# Patient Record
Sex: FEMALE | Race: WHITE | NOT HISPANIC OR LATINO | Employment: FULL TIME | URBAN - METROPOLITAN AREA
[De-identification: names, ages, dates, MRNs, and addresses within clinical notes are randomized per-mention and may not be internally consistent; named-entity substitution may affect disease eponyms.]

---

## 2017-08-03 VITALS
WEIGHT: 102 LBS | SYSTOLIC BLOOD PRESSURE: 110 MMHG | DIASTOLIC BLOOD PRESSURE: 64 MMHG | HEIGHT: 62 IN | BODY MASS INDEX: 18.77 KG/M2

## 2017-08-03 RX ORDER — TEMAZEPAM 15 MG/1
15 CAPSULE ORAL NIGHTLY PRN
COMMUNITY
End: 2019-10-01 | Stop reason: SDUPTHER

## 2017-08-03 RX ORDER — RALOXIFENE HYDROCHLORIDE 60 MG/1
60 TABLET, FILM COATED ORAL DAILY
COMMUNITY
End: 2017-08-18

## 2017-08-03 RX ORDER — ALPRAZOLAM 0.5 MG/1
0.5 TABLET ORAL 3 TIMES DAILY
COMMUNITY
End: 2017-11-06 | Stop reason: ALTCHOICE

## 2017-08-17 PROBLEM — C50.112 MALIGNANT NEOPLASM OF CENTRAL PORTION OF LEFT BREAST IN FEMALE, ESTROGEN RECEPTOR NEGATIVE: Status: ACTIVE | Noted: 2017-08-17

## 2017-08-17 PROBLEM — Z17.1 MALIGNANT NEOPLASM OF CENTRAL PORTION OF LEFT BREAST IN FEMALE, ESTROGEN RECEPTOR NEGATIVE: Status: ACTIVE | Noted: 2017-08-17

## 2017-08-23 ENCOUNTER — LAB VISIT (OUTPATIENT)
Dept: LAB | Facility: HOSPITAL | Age: 57
End: 2017-08-23
Attending: INTERNAL MEDICINE
Payer: COMMERCIAL

## 2017-08-23 DIAGNOSIS — Z17.1 MALIGNANT NEOPLASM OF CENTRAL PORTION OF LEFT BREAST IN FEMALE, ESTROGEN RECEPTOR NEGATIVE: ICD-10-CM

## 2017-08-23 DIAGNOSIS — C50.112 MALIGNANT NEOPLASM OF CENTRAL PORTION OF LEFT BREAST IN FEMALE, ESTROGEN RECEPTOR NEGATIVE: ICD-10-CM

## 2017-08-23 LAB
ALBUMIN SERPL BCP-MCNC: 4.6 G/DL
ALP SERPL-CCNC: 71 U/L
ALT SERPL W/O P-5'-P-CCNC: 55 U/L
ANION GAP SERPL CALC-SCNC: 9 MMOL/L
AST SERPL-CCNC: 36 U/L
BASOPHILS # BLD AUTO: 0.07 K/UL
BASOPHILS NFR BLD: 1.1 %
BILIRUB SERPL-MCNC: 0.6 MG/DL
BUN SERPL-MCNC: 15 MG/DL
CALCIUM SERPL-MCNC: 9.7 MG/DL
CHLORIDE SERPL-SCNC: 101 MMOL/L
CO2 SERPL-SCNC: 31 MMOL/L
CREAT SERPL-MCNC: 0.66 MG/DL
DIFFERENTIAL METHOD: ABNORMAL
EOSINOPHIL # BLD AUTO: 0.1 K/UL
EOSINOPHIL NFR BLD: 1.1 %
ERYTHROCYTE [DISTWIDTH] IN BLOOD BY AUTOMATED COUNT: 13.5 %
EST. GFR  (AFRICAN AMERICAN): >60 ML/MIN/1.73 M^2
EST. GFR  (NON AFRICAN AMERICAN): >60 ML/MIN/1.73 M^2
GLUCOSE SERPL-MCNC: 93 MG/DL
HCT VFR BLD AUTO: 40.4 %
HGB BLD-MCNC: 13.2 G/DL
LYMPHOCYTES # BLD AUTO: 1.7 K/UL
LYMPHOCYTES NFR BLD: 26.6 %
MAGNESIUM SERPL-MCNC: 2.3 MG/DL
MCH RBC QN AUTO: 30.6 PG
MCHC RBC AUTO-ENTMCNC: 32.7 G/DL
MCV RBC AUTO: 94 FL
MONOCYTES # BLD AUTO: 0.5 K/UL
MONOCYTES NFR BLD: 7.1 %
NEUTROPHILS # BLD AUTO: 4.1 K/UL
NEUTROPHILS NFR BLD: 64.1 %
NRBC BLD-RTO: 0 /100 WBC
PLATELET # BLD AUTO: 265 K/UL
PMV BLD AUTO: 9.1 FL
POTASSIUM SERPL-SCNC: 4 MMOL/L
PROT SERPL-MCNC: 7.9 G/DL
RBC # BLD AUTO: 4.32 M/UL
SODIUM SERPL-SCNC: 141 MMOL/L
WBC # BLD AUTO: 6.32 K/UL

## 2017-08-23 PROCEDURE — 80053 COMPREHEN METABOLIC PANEL: CPT | Mod: PN

## 2017-08-23 PROCEDURE — 80053 COMPREHEN METABOLIC PANEL: CPT

## 2017-08-23 PROCEDURE — 85025 COMPLETE CBC W/AUTO DIFF WBC: CPT

## 2017-08-23 PROCEDURE — 85025 COMPLETE CBC W/AUTO DIFF WBC: CPT | Mod: PN

## 2017-08-23 PROCEDURE — 83735 ASSAY OF MAGNESIUM: CPT

## 2017-08-23 PROCEDURE — 83735 ASSAY OF MAGNESIUM: CPT | Mod: PN

## 2017-08-23 PROCEDURE — 36415 COLL VENOUS BLD VENIPUNCTURE: CPT | Mod: PN

## 2017-08-24 ENCOUNTER — DOCUMENTATION ONLY (OUTPATIENT)
Dept: INFUSION THERAPY | Facility: HOSPITAL | Age: 57
End: 2017-08-24

## 2017-08-24 NOTE — PROGRESS NOTES
Nutrition: Late entry (8/23)/2017. Met with pt and pts family today for chemo new patient education. Pt is not currently a nutritional risk. Pt informed me she is eating well. Patient denies issues with bowels. Wt: 100# Nutrition recommendations: 1800kcals and 60 gram protein. Discussed the importance of weight maintenance and nutrition during treatment. Discussed the importance of consuming protein rich foods. Reviewed protein rich foods and discussed protein powders.  Discussed the importance of consuming adequate amounts of hydrating liquids. Discussed nausea MGT, bowel MGT and food safety. Encouraged to call with questions. Will follow up with patient once she begins treatment. Kita Jimenez, MS, RD, LDN

## 2017-08-30 ENCOUNTER — INFUSION (OUTPATIENT)
Dept: INFUSION THERAPY | Facility: HOSPITAL | Age: 57
End: 2017-08-30
Attending: INTERNAL MEDICINE
Payer: COMMERCIAL

## 2017-08-30 VITALS
TEMPERATURE: 98 F | HEART RATE: 76 BPM | SYSTOLIC BLOOD PRESSURE: 114 MMHG | RESPIRATION RATE: 16 BRPM | HEIGHT: 62 IN | DIASTOLIC BLOOD PRESSURE: 76 MMHG | BODY MASS INDEX: 18.71 KG/M2 | WEIGHT: 101.69 LBS

## 2017-08-30 DIAGNOSIS — Z17.1 MALIGNANT NEOPLASM OF CENTRAL PORTION OF LEFT BREAST IN FEMALE, ESTROGEN RECEPTOR NEGATIVE: Primary | ICD-10-CM

## 2017-08-30 DIAGNOSIS — C50.112 MALIGNANT NEOPLASM OF CENTRAL PORTION OF LEFT BREAST IN FEMALE, ESTROGEN RECEPTOR NEGATIVE: Primary | ICD-10-CM

## 2017-08-30 PROCEDURE — 96411 CHEMO IV PUSH ADDL DRUG: CPT | Mod: PN

## 2017-08-30 PROCEDURE — 96413 CHEMO IV INFUSION 1 HR: CPT | Mod: PN

## 2017-08-30 PROCEDURE — 25000003 PHARM REV CODE 250: Mod: PN | Performed by: INTERNAL MEDICINE

## 2017-08-30 PROCEDURE — 96375 TX/PRO/DX INJ NEW DRUG ADDON: CPT | Mod: PN

## 2017-08-30 PROCEDURE — 96367 TX/PROPH/DG ADDL SEQ IV INF: CPT | Mod: PN

## 2017-08-30 PROCEDURE — 63600175 PHARM REV CODE 636 W HCPCS: Mod: PN | Performed by: INTERNAL MEDICINE

## 2017-08-30 PROCEDURE — A4216 STERILE WATER/SALINE, 10 ML: HCPCS | Mod: PN | Performed by: INTERNAL MEDICINE

## 2017-08-30 RX ORDER — DOXORUBICIN HYDROCHLORIDE 2 MG/ML
60 INJECTION, SOLUTION INTRAVENOUS
Status: COMPLETED | OUTPATIENT
Start: 2017-08-30 | End: 2017-08-30

## 2017-08-30 RX ORDER — SODIUM CHLORIDE 0.9 % (FLUSH) 0.9 %
10 SYRINGE (ML) INJECTION
Status: DISCONTINUED | OUTPATIENT
Start: 2017-08-30 | End: 2017-08-30 | Stop reason: HOSPADM

## 2017-08-30 RX ORDER — HEPARIN 100 UNIT/ML
500 SYRINGE INTRAVENOUS
Status: DISCONTINUED | OUTPATIENT
Start: 2017-08-30 | End: 2017-08-30 | Stop reason: HOSPADM

## 2017-08-30 RX ORDER — LORAZEPAM 2 MG/ML
1 INJECTION INTRAMUSCULAR
Status: DISCONTINUED | OUTPATIENT
Start: 2017-08-30 | End: 2017-08-30 | Stop reason: HOSPADM

## 2017-08-30 RX ADMIN — CYCLOPHOSPHAMIDE 845 MG: 1 INJECTION, POWDER, FOR SOLUTION INTRAVENOUS; ORAL at 10:08

## 2017-08-30 RX ADMIN — SODIUM CHLORIDE, PRESERVATIVE FREE 10 ML: 5 INJECTION INTRAVENOUS at 09:08

## 2017-08-30 RX ADMIN — SODIUM CHLORIDE 150 MG: 9 INJECTION, SOLUTION INTRAVENOUS at 09:08

## 2017-08-30 RX ADMIN — HEPARIN 500 UNITS: 100 SYRINGE at 12:08

## 2017-08-30 RX ADMIN — PALONOSETRON HYDROCHLORIDE: 0.25 INJECTION INTRAVENOUS at 09:08

## 2017-08-30 RX ADMIN — LORAZEPAM 1 MG: 2 INJECTION INTRAMUSCULAR; INTRAVENOUS at 09:08

## 2017-08-30 RX ADMIN — DOXORUBICIN HYDROCHLORIDE 84 MG: 2 INJECTION, SOLUTION INTRAVENOUS at 10:08

## 2017-08-30 RX ADMIN — SODIUM CHLORIDE: 0.9 INJECTION, SOLUTION INTRAVENOUS at 09:08

## 2017-08-31 ENCOUNTER — INFUSION (OUTPATIENT)
Dept: INFUSION THERAPY | Facility: HOSPITAL | Age: 57
End: 2017-08-31
Attending: INTERNAL MEDICINE
Payer: COMMERCIAL

## 2017-08-31 VITALS
OXYGEN SATURATION: 99 % | RESPIRATION RATE: 16 BRPM | HEART RATE: 80 BPM | SYSTOLIC BLOOD PRESSURE: 104 MMHG | DIASTOLIC BLOOD PRESSURE: 64 MMHG | TEMPERATURE: 98 F

## 2017-08-31 DIAGNOSIS — Z17.1 MALIGNANT NEOPLASM OF CENTRAL PORTION OF LEFT BREAST IN FEMALE, ESTROGEN RECEPTOR NEGATIVE: Primary | ICD-10-CM

## 2017-08-31 DIAGNOSIS — C50.112 MALIGNANT NEOPLASM OF CENTRAL PORTION OF LEFT BREAST IN FEMALE, ESTROGEN RECEPTOR NEGATIVE: Primary | ICD-10-CM

## 2017-08-31 PROCEDURE — 96372 THER/PROPH/DIAG INJ SC/IM: CPT | Mod: PN

## 2017-08-31 PROCEDURE — 63600175 PHARM REV CODE 636 W HCPCS: Mod: PN | Performed by: INTERNAL MEDICINE

## 2017-08-31 RX ADMIN — PEGFILGRASTIM 6 MG: 6 INJECTION SUBCUTANEOUS at 03:08

## 2017-09-13 ENCOUNTER — DOCUMENTATION ONLY (OUTPATIENT)
Dept: INFUSION THERAPY | Facility: HOSPITAL | Age: 57
End: 2017-09-13

## 2017-09-13 ENCOUNTER — INFUSION (OUTPATIENT)
Dept: INFUSION THERAPY | Facility: HOSPITAL | Age: 57
End: 2017-09-13
Attending: INTERNAL MEDICINE
Payer: COMMERCIAL

## 2017-09-13 VITALS
RESPIRATION RATE: 18 BRPM | TEMPERATURE: 98 F | OXYGEN SATURATION: 100 % | WEIGHT: 100.69 LBS | BODY MASS INDEX: 18.53 KG/M2 | HEART RATE: 80 BPM | HEIGHT: 62 IN | SYSTOLIC BLOOD PRESSURE: 114 MMHG | DIASTOLIC BLOOD PRESSURE: 69 MMHG

## 2017-09-13 DIAGNOSIS — C50.112 MALIGNANT NEOPLASM OF CENTRAL PORTION OF LEFT BREAST IN FEMALE, ESTROGEN RECEPTOR NEGATIVE: Primary | ICD-10-CM

## 2017-09-13 DIAGNOSIS — Z17.1 MALIGNANT NEOPLASM OF CENTRAL PORTION OF LEFT BREAST IN FEMALE, ESTROGEN RECEPTOR NEGATIVE: Primary | ICD-10-CM

## 2017-09-13 PROCEDURE — 96367 TX/PROPH/DG ADDL SEQ IV INF: CPT | Mod: PN

## 2017-09-13 PROCEDURE — 96413 CHEMO IV INFUSION 1 HR: CPT | Mod: PN

## 2017-09-13 PROCEDURE — 63600175 PHARM REV CODE 636 W HCPCS: Mod: PN | Performed by: INTERNAL MEDICINE

## 2017-09-13 PROCEDURE — 96377 APPLICATON ON-BODY INJECTOR: CPT | Mod: PN

## 2017-09-13 PROCEDURE — 63600175 PHARM REV CODE 636 W HCPCS: Mod: PN | Performed by: PHYSICIAN ASSISTANT

## 2017-09-13 PROCEDURE — A4216 STERILE WATER/SALINE, 10 ML: HCPCS | Mod: PN | Performed by: PHYSICIAN ASSISTANT

## 2017-09-13 PROCEDURE — 96411 CHEMO IV PUSH ADDL DRUG: CPT | Mod: PN

## 2017-09-13 PROCEDURE — 25000003 PHARM REV CODE 250: Mod: PN | Performed by: PHYSICIAN ASSISTANT

## 2017-09-13 RX ORDER — SODIUM CHLORIDE 0.9 % (FLUSH) 0.9 %
10 SYRINGE (ML) INJECTION
Status: DISCONTINUED | OUTPATIENT
Start: 2017-09-13 | End: 2017-09-13 | Stop reason: HOSPADM

## 2017-09-13 RX ORDER — DOXORUBICIN HYDROCHLORIDE 2 MG/ML
60 INJECTION, SOLUTION INTRAVENOUS
Status: COMPLETED | OUTPATIENT
Start: 2017-09-13 | End: 2017-09-13

## 2017-09-13 RX ORDER — HEPARIN 100 UNIT/ML
500 SYRINGE INTRAVENOUS
Status: DISCONTINUED | OUTPATIENT
Start: 2017-09-13 | End: 2017-09-13 | Stop reason: HOSPADM

## 2017-09-13 RX ADMIN — SODIUM CHLORIDE 150 MG: 9 INJECTION, SOLUTION INTRAVENOUS at 09:09

## 2017-09-13 RX ADMIN — DOXORUBICIN HYDROCHLORIDE 84 MG: 2 INJECTION, SOLUTION INTRAVENOUS at 09:09

## 2017-09-13 RX ADMIN — SODIUM CHLORIDE: 0.9 INJECTION, SOLUTION INTRAVENOUS at 08:09

## 2017-09-13 RX ADMIN — SODIUM CHLORIDE, PRESERVATIVE FREE 10 ML: 5 INJECTION INTRAVENOUS at 11:09

## 2017-09-13 RX ADMIN — HEPARIN 500 UNITS: 100 SYRINGE at 11:09

## 2017-09-13 RX ADMIN — CYCLOPHOSPHAMIDE 845 MG: 1 INJECTION, POWDER, FOR SOLUTION INTRAVENOUS; ORAL at 10:09

## 2017-09-13 RX ADMIN — PEGFILGRASTIM 6 MG: KIT SUBCUTANEOUS at 11:09

## 2017-09-13 RX ADMIN — PALONOSETRON HYDROCHLORIDE: 0.25 INJECTION INTRAVENOUS at 09:09

## 2017-09-13 NOTE — PLAN OF CARE
Problem: Chemotherapy Effects (Adult)  Goal: Signs and Symptoms of Listed Potential Problems Will be Absent, Minimized or Managed (Chemotherapy Effects)  Signs and symptoms of listed potential problems will be absent, minimized or managed by discharge/transition of care (reference Chemotherapy Effects (Adult) CPG).   Outcome: Ongoing (interventions implemented as appropriate)  Pt receiving C2D1 Pt c/o diarrhea. Pts  be tx for c-diff pt on prophylactic flagyl tested negative for cdiff but pt was having diarrhea over the weekend. Symptoms resolved. Pt also presented to ED with fever over weekend. Dr Singh aware. Saw pt in chair. Pt on celexa for depression. Pt also c/o generalized fatigue. Will continue to monitor.

## 2017-09-13 NOTE — PROGRESS NOTES
:     LCSW met with pt in chair for social service aspect of chemo school. Provided [facility]     Distress Screening:    Patient did complete distress screening tool:  Distress Score    Distress Score: 10        Practical Problems Physical Problems   : No Appearance: Yes   Housing: No     Insurance / Financial: Yes      Transportation: No       Work / School: No      Treatment Decisions: No             Family Problems      Dealing with Children: No      Dealing with Partner: No      Ability to Have Children: No       Family Health Issues: Yes            Emotional Problems      Depression: Yes       Fears: No       Nervousness: No       Sadness: No      Worry: No      Lost of Interest in Usual Activities: No            Spiritual/Religions Concerns     Spritual / Holiness Concerns: No         Other Problems            LCSW provided support and acknowledged challenges. LCSW provided information on support groups.   Patient endorses financial concerns related to loss of work due to treatment. Pt has FMLA. Pt has short term disability policy through work and she will utilize this benefit during treatment. Concerned that income will be at 60% during time away on disability.     Pt's distress related to 's recent hospitalization and her concern for avoiding the transmission of his highly contagious virus. Pt endorses history of anxiety and depression related to the loss of her son 5 years ago. Pt is new grandmother to baby granddaughter. LCSW encouraged pt to utilize adaptive coping mechanisms, encouraged use of stress reduction leandro on phone, discussed peer support programs and made referrals.LCSW also encouraged patient to utilize on line and phone support services as well as offered to be available to patient for support. LCSW offered to make referral to behavioral health provider if pt wants to pursue counseling from a community provider.      Family/Social/Spiritual Support:  Spouse is supportive as is daughter and other family members.     Patient states she has many friends and family members who will be involved in support.    Patient sites deep luisa, strong support system, and positive attitude as aspects of his/her strength.         Plan:  1. Patient indicated interest in [employer] for financial support. This LCSW provided application and  will assist.  2. Patient also indicated interest in [employer] as well as [employer]. This LCSW will enroll pt.  3. Pt will purse funding through [employer] when application process opens on Oct.1. LCSW remind pt.  4. Pt interested in wig boutique. LCSW will assist pt when she is ready. LCSW did provide pt with 2 head coverings.  5. LCSW following. Will meet again with pt to assess distress level and assist with zi applications.    Ebonie Ramirez LCSW

## 2017-09-13 NOTE — PLAN OF CARE
Problem: Patient Care Overview  Goal: Plan of Care Review  Outcome: Ongoing (interventions implemented as appropriate)  Pt tolerated tx well. No signs of infusion reaction noted. AVS given to pt. Pt educated to call Dr with any issues. Pt verbalized understanding.

## 2017-09-27 ENCOUNTER — INFUSION (OUTPATIENT)
Dept: INFUSION THERAPY | Facility: HOSPITAL | Age: 57
End: 2017-09-27
Attending: INTERNAL MEDICINE
Payer: COMMERCIAL

## 2017-09-27 ENCOUNTER — DOCUMENTATION ONLY (OUTPATIENT)
Dept: INFUSION THERAPY | Facility: HOSPITAL | Age: 57
End: 2017-09-27

## 2017-09-27 VITALS
DIASTOLIC BLOOD PRESSURE: 76 MMHG | HEART RATE: 83 BPM | WEIGHT: 96.63 LBS | BODY MASS INDEX: 17.78 KG/M2 | SYSTOLIC BLOOD PRESSURE: 124 MMHG | TEMPERATURE: 98 F | RESPIRATION RATE: 20 BRPM | HEIGHT: 62 IN

## 2017-09-27 DIAGNOSIS — C50.112 MALIGNANT NEOPLASM OF CENTRAL PORTION OF LEFT BREAST IN FEMALE, ESTROGEN RECEPTOR NEGATIVE: Primary | ICD-10-CM

## 2017-09-27 DIAGNOSIS — Z17.1 MALIGNANT NEOPLASM OF CENTRAL PORTION OF LEFT BREAST IN FEMALE, ESTROGEN RECEPTOR NEGATIVE: Primary | ICD-10-CM

## 2017-09-27 PROCEDURE — 96367 TX/PROPH/DG ADDL SEQ IV INF: CPT | Mod: PN

## 2017-09-27 PROCEDURE — 96413 CHEMO IV INFUSION 1 HR: CPT | Mod: PN

## 2017-09-27 PROCEDURE — 96411 CHEMO IV PUSH ADDL DRUG: CPT | Mod: PN

## 2017-09-27 PROCEDURE — 63600175 PHARM REV CODE 636 W HCPCS: Mod: PN | Performed by: PHYSICIAN ASSISTANT

## 2017-09-27 PROCEDURE — 96377 APPLICATON ON-BODY INJECTOR: CPT | Mod: PN

## 2017-09-27 PROCEDURE — A4216 STERILE WATER/SALINE, 10 ML: HCPCS | Mod: PN | Performed by: PHYSICIAN ASSISTANT

## 2017-09-27 PROCEDURE — 25000003 PHARM REV CODE 250: Mod: PN | Performed by: PHYSICIAN ASSISTANT

## 2017-09-27 RX ORDER — DOXORUBICIN HYDROCHLORIDE 2 MG/ML
60 INJECTION, SOLUTION INTRAVENOUS
Status: COMPLETED | OUTPATIENT
Start: 2017-09-27 | End: 2017-09-27

## 2017-09-27 RX ORDER — SODIUM CHLORIDE 0.9 % (FLUSH) 0.9 %
10 SYRINGE (ML) INJECTION
Status: DISCONTINUED | OUTPATIENT
Start: 2017-09-27 | End: 2017-09-27 | Stop reason: HOSPADM

## 2017-09-27 RX ADMIN — CYCLOPHOSPHAMIDE 845 MG: 1 INJECTION, POWDER, FOR SOLUTION INTRAVENOUS; ORAL at 10:09

## 2017-09-27 RX ADMIN — SODIUM CHLORIDE, PRESERVATIVE FREE 10 ML: 5 INJECTION INTRAVENOUS at 08:09

## 2017-09-27 RX ADMIN — DOXORUBICIN HYDROCHLORIDE 84 MG: 2 INJECTION, SOLUTION INTRAVENOUS at 09:09

## 2017-09-27 RX ADMIN — PALONOSETRON HYDROCHLORIDE: 0.25 INJECTION INTRAVENOUS at 08:09

## 2017-09-27 RX ADMIN — PEGFILGRASTIM 6 MG: KIT SUBCUTANEOUS at 12:09

## 2017-09-27 RX ADMIN — SODIUM CHLORIDE: 0.9 INJECTION, SOLUTION INTRAVENOUS at 08:09

## 2017-09-27 RX ADMIN — SODIUM CHLORIDE 150 MG: 9 INJECTION, SOLUTION INTRAVENOUS at 09:09

## 2017-09-27 NOTE — NURSING
Pt left after tx today to go for MRI. Pt returned to infusion after MRI for OBI neulasta placement.

## 2017-10-11 ENCOUNTER — INFUSION (OUTPATIENT)
Dept: INFUSION THERAPY | Facility: HOSPITAL | Age: 57
End: 2017-10-11
Attending: INTERNAL MEDICINE
Payer: COMMERCIAL

## 2017-10-11 ENCOUNTER — LAB VISIT (OUTPATIENT)
Dept: LAB | Facility: HOSPITAL | Age: 57
End: 2017-10-11
Attending: INTERNAL MEDICINE
Payer: COMMERCIAL

## 2017-10-11 ENCOUNTER — DOCUMENTATION ONLY (OUTPATIENT)
Dept: INFUSION THERAPY | Facility: HOSPITAL | Age: 57
End: 2017-10-11

## 2017-10-11 VITALS
RESPIRATION RATE: 17 BRPM | SYSTOLIC BLOOD PRESSURE: 106 MMHG | WEIGHT: 99.5 LBS | BODY MASS INDEX: 18.31 KG/M2 | HEIGHT: 62 IN | TEMPERATURE: 98 F | DIASTOLIC BLOOD PRESSURE: 64 MMHG | HEART RATE: 74 BPM

## 2017-10-11 DIAGNOSIS — C50.112 MALIGNANT NEOPLASM OF CENTRAL PORTION OF LEFT BREAST IN FEMALE, ESTROGEN RECEPTOR NEGATIVE: Primary | ICD-10-CM

## 2017-10-11 DIAGNOSIS — C50.112 MALIGNANT NEOPLASM OF CENTRAL PORTION OF LEFT BREAST IN FEMALE, ESTROGEN RECEPTOR NEGATIVE: ICD-10-CM

## 2017-10-11 DIAGNOSIS — Z17.1 MALIGNANT NEOPLASM OF CENTRAL PORTION OF LEFT BREAST IN FEMALE, ESTROGEN RECEPTOR NEGATIVE: Primary | ICD-10-CM

## 2017-10-11 DIAGNOSIS — Z17.1 MALIGNANT NEOPLASM OF CENTRAL PORTION OF LEFT BREAST IN FEMALE, ESTROGEN RECEPTOR NEGATIVE: ICD-10-CM

## 2017-10-11 LAB
ALBUMIN SERPL BCP-MCNC: 4.1 G/DL
ALP SERPL-CCNC: 155 U/L
ALT SERPL W/O P-5'-P-CCNC: 90 U/L
ANION GAP SERPL CALC-SCNC: 9 MMOL/L
AST SERPL-CCNC: 61 U/L
BASOPHILS NFR BLD: 0 %
BILIRUB SERPL-MCNC: 0.3 MG/DL
BUN SERPL-MCNC: 13 MG/DL
CALCIUM SERPL-MCNC: 9.6 MG/DL
CHLORIDE SERPL-SCNC: 102 MMOL/L
CO2 SERPL-SCNC: 28 MMOL/L
CREAT SERPL-MCNC: 0.55 MG/DL
DIFFERENTIAL METHOD: ABNORMAL
EOSINOPHIL NFR BLD: 0 %
ERYTHROCYTE [DISTWIDTH] IN BLOOD BY AUTOMATED COUNT: 14.2 %
EST. GFR  (AFRICAN AMERICAN): >60 ML/MIN/1.73 M^2
EST. GFR  (NON AFRICAN AMERICAN): >60 ML/MIN/1.73 M^2
GLUCOSE SERPL-MCNC: 89 MG/DL
HCT VFR BLD AUTO: 31.4 %
HGB BLD-MCNC: 10.6 G/DL
LYMPHOCYTES NFR BLD: 3 %
MAGNESIUM SERPL-MCNC: 2.2 MG/DL
MCH RBC QN AUTO: 30 PG
MCHC RBC AUTO-ENTMCNC: 33.8 G/DL
MCV RBC AUTO: 89 FL
METAMYELOCYTES NFR BLD MANUAL: 1 %
MONOCYTES NFR BLD: 10 %
MYELOCYTES NFR BLD MANUAL: 2 %
NEUTROPHILS # BLD AUTO: 19.4 K/UL
NEUTROPHILS NFR BLD: 79 %
NEUTS BAND NFR BLD MANUAL: 5 %
NRBC BLD-RTO: 0 /100 WBC
PLATELET # BLD AUTO: 193 K/UL
PMV BLD AUTO: 9.5 FL
POLYCHROMASIA BLD QL SMEAR: ABNORMAL
POTASSIUM SERPL-SCNC: 4.1 MMOL/L
PROT SERPL-MCNC: 7.2 G/DL
RBC # BLD AUTO: 3.53 M/UL
SODIUM SERPL-SCNC: 139 MMOL/L
TOXIC GRANULES BLD QL SMEAR: PRESENT
WBC # BLD AUTO: 23.1 K/UL

## 2017-10-11 PROCEDURE — 85007 BL SMEAR W/DIFF WBC COUNT: CPT | Mod: PN

## 2017-10-11 PROCEDURE — 83735 ASSAY OF MAGNESIUM: CPT | Mod: PN

## 2017-10-11 PROCEDURE — 85027 COMPLETE CBC AUTOMATED: CPT

## 2017-10-11 PROCEDURE — 80053 COMPREHEN METABOLIC PANEL: CPT | Mod: PN

## 2017-10-11 PROCEDURE — 85007 BL SMEAR W/DIFF WBC COUNT: CPT

## 2017-10-11 PROCEDURE — 96413 CHEMO IV INFUSION 1 HR: CPT | Mod: PN

## 2017-10-11 PROCEDURE — A4216 STERILE WATER/SALINE, 10 ML: HCPCS | Mod: PN | Performed by: PHYSICIAN ASSISTANT

## 2017-10-11 PROCEDURE — 63600175 PHARM REV CODE 636 W HCPCS: Mod: PN | Performed by: PHYSICIAN ASSISTANT

## 2017-10-11 PROCEDURE — 80053 COMPREHEN METABOLIC PANEL: CPT

## 2017-10-11 PROCEDURE — 96367 TX/PROPH/DG ADDL SEQ IV INF: CPT | Mod: PN

## 2017-10-11 PROCEDURE — 83735 ASSAY OF MAGNESIUM: CPT

## 2017-10-11 PROCEDURE — 36415 COLL VENOUS BLD VENIPUNCTURE: CPT | Mod: PN

## 2017-10-11 PROCEDURE — 96377 APPLICATON ON-BODY INJECTOR: CPT | Mod: PN

## 2017-10-11 PROCEDURE — 85027 COMPLETE CBC AUTOMATED: CPT | Mod: PN

## 2017-10-11 PROCEDURE — 25000003 PHARM REV CODE 250: Mod: PN | Performed by: PHYSICIAN ASSISTANT

## 2017-10-11 PROCEDURE — 96411 CHEMO IV PUSH ADDL DRUG: CPT | Mod: PN

## 2017-10-11 RX ORDER — SODIUM CHLORIDE 0.9 % (FLUSH) 0.9 %
10 SYRINGE (ML) INJECTION
Status: DISCONTINUED | OUTPATIENT
Start: 2017-10-11 | End: 2017-10-11 | Stop reason: HOSPADM

## 2017-10-11 RX ORDER — DOXORUBICIN HYDROCHLORIDE 2 MG/ML
60 INJECTION, SOLUTION INTRAVENOUS
Status: COMPLETED | OUTPATIENT
Start: 2017-10-11 | End: 2017-10-11

## 2017-10-11 RX ADMIN — SODIUM CHLORIDE 150 MG: 9 INJECTION, SOLUTION INTRAVENOUS at 09:10

## 2017-10-11 RX ADMIN — SODIUM CHLORIDE, PRESERVATIVE FREE 10 ML: 5 INJECTION INTRAVENOUS at 11:10

## 2017-10-11 RX ADMIN — PEGFILGRASTIM 6 MG: KIT SUBCUTANEOUS at 11:10

## 2017-10-11 RX ADMIN — PALONOSETRON HYDROCHLORIDE: 0.25 INJECTION INTRAVENOUS at 09:10

## 2017-10-11 RX ADMIN — CYCLOPHOSPHAMIDE 845 MG: 1 INJECTION, POWDER, FOR SOLUTION INTRAVENOUS; ORAL at 10:10

## 2017-10-11 RX ADMIN — DOXORUBICIN HYDROCHLORIDE 84 MG: 2 INJECTION, SOLUTION INTRAVENOUS at 10:10

## 2017-10-11 RX ADMIN — SODIUM CHLORIDE: 0.9 INJECTION, SOLUTION INTRAVENOUS at 09:10

## 2017-10-11 NOTE — PLAN OF CARE
Problem: Chemotherapy Effects (Adult)  Goal: Signs and Symptoms of Listed Potential Problems Will be Absent, Minimized or Managed (Chemotherapy Effects)  Signs and symptoms of listed potential problems will be absent, minimized or managed by discharge/transition of care (reference Chemotherapy Effects (Adult) CPG).   Outcome: Ongoing (interventions implemented as appropriate)  Pt receiving AC during shift today Pt only complaints generalized fatigue and sob improving, Will continue to monitor.

## 2017-10-11 NOTE — PROGRESS NOTES
Nutrition: Weight check. Weight slightly increased since last chemo cycle. Wt: 99# Will assist if and when needed. Kita Jimenez, MS, RD, LDN

## 2017-10-11 NOTE — PLAN OF CARE
Problem: Patient Care Overview  Goal: Plan of Care Review  Outcome: Ongoing (interventions implemented as appropriate)  Pt tolerated tx well. AVS given to pt. Pt ambulated upon discharge. Pt educated to call Dr with any issues. Pt verbalized understanding.

## 2017-10-23 ENCOUNTER — DOCUMENTATION ONLY (OUTPATIENT)
Dept: INFUSION THERAPY | Facility: HOSPITAL | Age: 57
End: 2017-10-23

## 2017-10-23 NOTE — PROGRESS NOTES
Received message from JAY Moreau ok to move pts taxol weekly from 10/26 to 10/30. Pt needs mondays

## 2017-10-30 ENCOUNTER — INFUSION (OUTPATIENT)
Dept: INFUSION THERAPY | Facility: HOSPITAL | Age: 57
End: 2017-10-30
Attending: INTERNAL MEDICINE
Payer: COMMERCIAL

## 2017-10-30 VITALS
DIASTOLIC BLOOD PRESSURE: 62 MMHG | HEART RATE: 71 BPM | TEMPERATURE: 98 F | SYSTOLIC BLOOD PRESSURE: 98 MMHG | WEIGHT: 98.81 LBS | RESPIRATION RATE: 16 BRPM | OXYGEN SATURATION: 99 % | HEIGHT: 62 IN | BODY MASS INDEX: 18.18 KG/M2

## 2017-10-30 DIAGNOSIS — C50.112 MALIGNANT NEOPLASM OF CENTRAL PORTION OF LEFT BREAST IN FEMALE, ESTROGEN RECEPTOR NEGATIVE: Primary | ICD-10-CM

## 2017-10-30 DIAGNOSIS — Z17.1 MALIGNANT NEOPLASM OF CENTRAL PORTION OF LEFT BREAST IN FEMALE, ESTROGEN RECEPTOR NEGATIVE: Primary | ICD-10-CM

## 2017-10-30 PROCEDURE — 96413 CHEMO IV INFUSION 1 HR: CPT | Mod: PN

## 2017-10-30 PROCEDURE — 25000003 PHARM REV CODE 250: Mod: PN | Performed by: INTERNAL MEDICINE

## 2017-10-30 PROCEDURE — 96375 TX/PRO/DX INJ NEW DRUG ADDON: CPT | Mod: PN

## 2017-10-30 PROCEDURE — 96377 APPLICATON ON-BODY INJECTOR: CPT | Mod: PN

## 2017-10-30 PROCEDURE — S0028 INJECTION, FAMOTIDINE, 20 MG: HCPCS | Mod: PN | Performed by: INTERNAL MEDICINE

## 2017-10-30 PROCEDURE — A4216 STERILE WATER/SALINE, 10 ML: HCPCS | Mod: PN | Performed by: INTERNAL MEDICINE

## 2017-10-30 PROCEDURE — 63600175 PHARM REV CODE 636 W HCPCS: Mod: PN | Performed by: INTERNAL MEDICINE

## 2017-10-30 PROCEDURE — 96367 TX/PROPH/DG ADDL SEQ IV INF: CPT | Mod: PN

## 2017-10-30 RX ORDER — SODIUM CHLORIDE 0.9 % (FLUSH) 0.9 %
10 SYRINGE (ML) INJECTION
Status: DISCONTINUED | OUTPATIENT
Start: 2017-10-30 | End: 2017-10-30 | Stop reason: HOSPADM

## 2017-10-30 RX ORDER — LORAZEPAM 2 MG/ML
0.5 INJECTION INTRAMUSCULAR EVERY 8 HOURS PRN
Status: DISCONTINUED | OUTPATIENT
Start: 2017-10-30 | End: 2017-10-30 | Stop reason: HOSPADM

## 2017-10-30 RX ORDER — FAMOTIDINE 20 MG/50ML
20 INJECTION, SOLUTION INTRAVENOUS
Status: COMPLETED | OUTPATIENT
Start: 2017-10-30 | End: 2017-10-30

## 2017-10-30 RX ORDER — EPINEPHRINE 0.3 MG/.3ML
0.3 INJECTION SUBCUTANEOUS ONCE AS NEEDED
Status: DISCONTINUED | OUTPATIENT
Start: 2017-10-30 | End: 2017-10-30 | Stop reason: HOSPADM

## 2017-10-30 RX ORDER — DIPHENHYDRAMINE HYDROCHLORIDE 50 MG/ML
50 INJECTION INTRAMUSCULAR; INTRAVENOUS ONCE AS NEEDED
Status: DISCONTINUED | OUTPATIENT
Start: 2017-10-30 | End: 2017-10-30 | Stop reason: HOSPADM

## 2017-10-30 RX ORDER — HEPARIN 100 UNIT/ML
500 SYRINGE INTRAVENOUS
Status: DISCONTINUED | OUTPATIENT
Start: 2017-10-30 | End: 2017-10-30 | Stop reason: HOSPADM

## 2017-10-30 RX ADMIN — FAMOTIDINE 20 MG: 20 INJECTION, SOLUTION INTRAVENOUS at 01:10

## 2017-10-30 RX ADMIN — SODIUM CHLORIDE 16 MG: 9 INJECTION, SOLUTION INTRAVENOUS at 02:10

## 2017-10-30 RX ADMIN — DEXAMETHASONE SODIUM PHOSPHATE 10 MG: 4 INJECTION, SOLUTION INTRA-ARTICULAR; INTRALESIONAL; INTRAMUSCULAR; INTRAVENOUS; SOFT TISSUE at 01:10

## 2017-10-30 RX ADMIN — DIPHENHYDRAMINE HYDROCHLORIDE 25 MG: 50 INJECTION, SOLUTION INTRAMUSCULAR; INTRAVENOUS at 12:10

## 2017-10-30 RX ADMIN — PACLITAXEL 114 MG: 6 INJECTION, SOLUTION, CONCENTRATE INTRAVENOUS at 02:10

## 2017-10-30 RX ADMIN — SODIUM CHLORIDE: 900 INJECTION, SOLUTION INTRAVENOUS at 12:10

## 2017-10-30 RX ADMIN — SODIUM CHLORIDE, PRESERVATIVE FREE 10 ML: 5 INJECTION INTRAVENOUS at 12:10

## 2017-10-30 RX ADMIN — LORAZEPAM 0.5 MG: 2 INJECTION INTRAMUSCULAR; INTRAVENOUS at 01:10

## 2017-10-30 NOTE — PATIENT INSTRUCTIONS
Discharge Instructions for Chemotherapy  Your healthcare provider prescribed a type of medicine therapy for you called chemotherapy. Healthcare providers prescribe chemotherapy for many different types of illnesses, including cancer. There are many types of chemotherapy. This sheet provides general guidelines on how you can take care of yourself after your chemotherapy.  Mouth care  Dont be discouraged if you get mouth sores, even if you are following all your healthcare providers instructions. Many people get mouth sores as a side effect of chemotherapy. Heres what you can do to prevent mouth sores:  · Keep your mouth clean. Brush your teeth with a soft-bristle toothbrush after every meal.  · Ask if you should use a toothpaste with fluoride, or a mixture of 1 teaspoon of salt in 8-ounces of water to brush your teeth.   · Use an oral swab or special soft toothbrush if your gums bleed during regular brushing.  · Don't use dental floss if it causes your gums to bleed.  · Use any mouthwashes given to you as directed.  · If you cant tolerate regular methods, use salt and baking soda to clean your mouth. Mix 1 teaspoon of salt and 1 teaspoon of baking soda into an 8-ounce glass of warm water. Swish and spit.  · If you wear dentures, you may be told to wear them only when you eat, ask your healthcare provider. Clean dentures twice a day and soak in antimicrobial solution when you aren't wearing them. Rinse your mouth after each meal.   · Watch your mouth and tongue for white patches. This may be a sign of a type of yeast infection (thrush), a common side effect of chemotherapy. Be sure to tell your healthcare provider about these patches. Medicine can be prescribed to treat it.  Other home care  Here's what else you can do:  · Try to exercise. Exercise keeps you strong and keeps your heart and lungs active. Walking and yoga are good types of exercise.   · Keep clean. During chemotherapy, your body cant fight  infection very well. Take short baths or showers.  ¨ Wash your hands before you eat and after going to the bathroom.  ¨ Use moisturizing soap. Chemotherapy can make your skin dry.  ¨ Apply moisturizing lotion several times a day to help relieve dry skin.  ¨ Dont take very hot or very cold showers or baths.  · Dont be surprised if your chemotherapy causes slight burns to your skin--usually on the hands and feet. Some medicines used in high doses cause this to happen. Ask for a special cream to help relieve the burn and protect your skin.  · Avoid people who are sick with illnesses and diseases you could catch, such as colds, flu, measles, or chicken pox as well as people who have recently had vaccinations for these illnesses.   · Let your healthcare provider know if your throat is sore. You may have an infection that needs treatment.  · Remember, many patients feel sick and lose their appetites during treatment. Eat small meals several times a day to keep your strength up:  ¨ Choose bland foods with little taste or smell if you are reacting strongly to food.  ¨ Be sure to cook all food thoroughly. This kills bacteria and helps you avoid infection.  ¨ Eat foods that are soft. Soft foods are less likely to cause stomach irritation.  ¨ Try to eat a variety of foods for a well-balanced diet. Drink plenty of fluids and eat foods with fiber to avoid constipation.      When to call your healthcare provider  Call your healthcare provider right away if you have any of the following:  · Unexplained bleeding  · Trouble concentrating  · Ongoing fatigue  · Shortness of breath, wheezing, trouble breathing, or bad cough  · Rapid, irregular heartbeat, or chest pain  · Dizziness, lightheadedness  · Constant feeling of being cold  · Hives or a cut or rash that swells, turns red, feels hot or painful, or begins to ooze  · Burning when you urinate  · Fever of 100.4°F (38°C) or higher, or chills   Date Last Reviewed: 5/1/2016  ©  6980-9085 [employer]. [address] [place] [id number]. All rights reserved. This information is not intended as a substitute for professional medical care. Always follow your healthcare professional's instructions.        Paclitaxel injection  What is this medicine?  PACLITAXEL (SUJATA li TAX el) is a chemotherapy drug. It targets fast dividing cells, like cancer cells, and causes these cells to die. This medicine is used to treat ovarian cancer, breast cancer, and other cancers.  How should I use this medicine?  This drug is given as an infusion into a vein. It is administered in a hospital or clinic by a specially trained health care professional.  Talk to your pediatrician regarding the use of this medicine in children. Special care may be needed.  What side effects may I notice from receiving this medicine?  Side effects that you should report to your doctor or health care professional as soon as possible:  · allergic reactions like skin rash, itching or hives, swelling of the face, lips, or tongue  · low blood counts - This drug may decrease the number of white blood cells, red blood cells and platelets. You may be at increased risk for infections and bleeding.  · signs of infection - fever or chills, cough, sore throat, pain or difficulty passing urine  · signs of decreased platelets or bleeding - bruising, pinpoint red spots on the skin, black, tarry stools, nosebleeds  · signs of decreased red blood cells - unusually weak or tired, fainting spells, lightheadedness  · breathing problems  · chest pain  · high or low blood pressure  · mouth sores  · nausea and vomiting  · pain, swelling, redness or irritation at the injection site  · pain, tingling, numbness in the hands or feet  · slow or irregular heartbeat  · swelling of the ankle, feet, hands  Side effects that usually do not require medical attention (report to your doctor or health care professional if they continue or are  bothersome):  · bone pain  · complete hair loss including hair on your head, underarms, pubic hair, eyebrows, and eyelashes  · changes in the color of fingernails  · diarrhea  · loosening of the fingernails  · loss of appetite  · muscle or joint pain  · red flush to skin  · sweating  What may interact with this medicine?  Do not take this medicine with any of the following medications:  · disulfiram  · metronidazole  This medicine may also interact with the following medications:  · cyclosporine  · diazepam  · ketoconazole  · medicines to increase blood counts like filgrastim, pegfilgrastim, sargramostim  · other chemotherapy drugs like cisplatin, doxorubicin, epirubicin, etoposide, teniposide, vincristine  · quinidine  · testosterone  · vaccines  · verapamil  Talk to your doctor or health care professional before taking any of these medicines:  · acetaminophen  · aspirin  · ibuprofen  · ketoprofen  · naproxen  What if I miss a dose?  It is important not to miss your dose. Call your doctor or health care professional if you are unable to keep an appointment.  Where should I keep my medicine?  This drug is given in a hospital or clinic and will not be stored at home.  What should I tell my health care provider before I take this medicine?  They need to know if you have any of these conditions:  · blood disorders  · irregular heartbeat  · infection (especially a virus infection such as chickenpox, cold sores, or herpes)  · liver disease  · previous or ongoing radiation therapy  · an unusual or allergic reaction to paclitaxel, alcohol, polyoxyethylated castor oil, other chemotherapy agents, other medicines, foods, dyes, or preservatives  · pregnant or trying to get pregnant  · breast-feeding  What should I watch for while using this medicine?  Your condition will be monitored carefully while you are receiving this medicine. You will need important blood work done while you are taking this medicine.  This medicine can  cause serious allergic reactions. To reduce your risk you will need to take other medicine(s) before treatment with this medicine. If you experience allergic reactions like skin rash, itching or hives, swelling of the face, lips, or tongue, tell your doctor or health care professional right away.  In some cases, you may be given additional medicines to help with side effects. Follow all directions for their use.  This drug may make you feel generally unwell. This is not uncommon, as chemotherapy can affect healthy cells as well as cancer cells. Report any side effects. Continue your course of treatment even though you feel ill unless your doctor tells you to stop.  Call your doctor or health care professional for advice if you get a fever, chills or sore throat, or other symptoms of a cold or flu. Do not treat yourself. This drug decreases your body's ability to fight infections. Try to avoid being around people who are sick.  This medicine may increase your risk to bruise or bleed. Call your doctor or health care professional if you notice any unusual bleeding.  Be careful brushing and flossing your teeth or using a toothpick because you may get an infection or bleed more easily. If you have any dental work done, tell your dentist you are receiving this medicine.  Avoid taking products that contain aspirin, acetaminophen, ibuprofen, naproxen, or ketoprofen unless instructed by your doctor. These medicines may hide a fever.  Do not become pregnant while taking this medicine. Women should inform their doctor if they wish to become pregnant or think they might be pregnant. There is a potential for serious side effects to an unborn child. Talk to your health care professional or pharmacist for more information. Do not breast-feed an infant while taking this medicine.  Men are advised not to father a child while receiving this medicine.  This product may contain alcohol. Ask your pharmacist or healthcare provider if  this medicine contains alcohol. Be sure to tell all healthcare providers you are taking this medicine. Certain medicines, like metronidazole and disulfiram, can cause an unpleasant reaction when taken with alcohol. The reaction includes flushing, headache, nausea, vomiting, sweating, and increased thirst. The reaction can last from 30 minutes to several hours.  NOTE:This sheet is a summary. It may not cover all possible information. If you have questions about this medicine, talk to your doctor, pharmacist, or health care provider. Copyright© 2017 [employer]        Preventing Falls: Are You At Risk of Falling?     Ask for help to reduce risk of falling in your home.     As you get older, you're not as steady on your feet as you once were. And you may have health problems you didn't have when you were younger. So, it's not surprising that older people are more likely to trip and fall. Falling can be very serious. It can change your overall health and quality of life. That's why it's important to be aware of your own risk of falling.  The dangers of falling  Falls are one of the main causes of injury in people over age 65. An older person who falls may take longer to get better than a younger person. And, after a fall, an older person is more likely to have problems that don't go away. So, preventing falls can help you avoid serious health problems.  Are you at risk of falling?  Answer these questions to rate your level of risk.  · Are you a woman?  · Have you fallen or stumbled in the last year?  · Are you over age 65?  · Are you ever dizzy or lightheaded with standing?  · Do you have a hard time getting in and out of the bathtub or on and off the toilet?  · Do you lean on objects to help you get around? Or do you use a cane or walker?  · Do you have vision or hearing problems? For example, do you need new glasses or hearing aids?  · Do you have 2 or more long-lasting (chronic) medical conditions?  · Do you take 3  "or more medicines?  · Have you felt depressed recently?  · Have you had more trouble with your memory in recent months?  · Are there hazards in your home that might cause you to fall, such as loose rugs or poor lighting?  · Do you have a pet that jumps on you or might trip you?  · Have you stopped getting regular exercise?  · Do you have diabetes?   · Do you have a neurologic disease, such as Parkinson or Alzheimer disease?   · Do you drink alcohol?  · Do you wear athletic shoes or slippers, or go barefoot at home?  You can help prevent falls  If you answered "yes" to any of the above questions, you should take steps to reduce your risk of a fall. Monitoring health conditions and keeping walkways in your home free of clutter are just 2 ways. Changing is sometimes easier said than done. But keep in mind that even small changes can make you less likely to fall.  The fear of falling  It's normal to be scared of falling, especially if you've fallen before. But being afraid can actually make you more likely to fall. This is because:  · Fear might cause you to become less active. Being less active can lead to a loss of strength and balance.  · Fear can lead to isolation from others, depression, or the use of more medicines or alcohol. And all these things make falling even more likely.  To break the cycle, learn more about ways to avoid falling. As you take control, you may find yourself feeling less afraid.   Date Last Reviewed: 6/12/2015  © 4023-2054 [employer]. [address] [place] [address], [place] [address]. All rights reserved. This information is not intended as a substitute for professional medical care. Always follow your healthcare professional's instructions.        "

## 2017-11-06 ENCOUNTER — INFUSION (OUTPATIENT)
Dept: INFUSION THERAPY | Facility: HOSPITAL | Age: 57
End: 2017-11-06
Attending: INTERNAL MEDICINE
Payer: COMMERCIAL

## 2017-11-06 ENCOUNTER — DOCUMENTATION ONLY (OUTPATIENT)
Dept: INFUSION THERAPY | Facility: HOSPITAL | Age: 57
End: 2017-11-06

## 2017-11-06 VITALS
RESPIRATION RATE: 16 BRPM | TEMPERATURE: 98 F | BODY MASS INDEX: 18.55 KG/M2 | HEIGHT: 62 IN | WEIGHT: 100.81 LBS | SYSTOLIC BLOOD PRESSURE: 110 MMHG | DIASTOLIC BLOOD PRESSURE: 66 MMHG | HEART RATE: 64 BPM

## 2017-11-06 DIAGNOSIS — C50.112 MALIGNANT NEOPLASM OF CENTRAL PORTION OF LEFT BREAST IN FEMALE, ESTROGEN RECEPTOR NEGATIVE: Primary | ICD-10-CM

## 2017-11-06 DIAGNOSIS — Z17.1 MALIGNANT NEOPLASM OF CENTRAL PORTION OF LEFT BREAST IN FEMALE, ESTROGEN RECEPTOR NEGATIVE: Primary | ICD-10-CM

## 2017-11-06 PROCEDURE — 96413 CHEMO IV INFUSION 1 HR: CPT | Mod: PN

## 2017-11-06 PROCEDURE — 63600175 PHARM REV CODE 636 W HCPCS: Mod: PN | Performed by: PHYSICIAN ASSISTANT

## 2017-11-06 PROCEDURE — 96367 TX/PROPH/DG ADDL SEQ IV INF: CPT | Mod: PN

## 2017-11-06 PROCEDURE — S0028 INJECTION, FAMOTIDINE, 20 MG: HCPCS | Mod: PN | Performed by: PHYSICIAN ASSISTANT

## 2017-11-06 PROCEDURE — 25000003 PHARM REV CODE 250: Mod: PN | Performed by: PHYSICIAN ASSISTANT

## 2017-11-06 PROCEDURE — 96375 TX/PRO/DX INJ NEW DRUG ADDON: CPT | Mod: PN

## 2017-11-06 PROCEDURE — A4216 STERILE WATER/SALINE, 10 ML: HCPCS | Mod: PN | Performed by: PHYSICIAN ASSISTANT

## 2017-11-06 RX ORDER — SODIUM CHLORIDE 0.9 % (FLUSH) 0.9 %
10 SYRINGE (ML) INJECTION
Status: DISCONTINUED | OUTPATIENT
Start: 2017-11-06 | End: 2017-11-06 | Stop reason: HOSPADM

## 2017-11-06 RX ORDER — DIPHENHYDRAMINE HYDROCHLORIDE 50 MG/ML
25 INJECTION INTRAMUSCULAR; INTRAVENOUS
Status: COMPLETED | OUTPATIENT
Start: 2017-11-06 | End: 2017-11-06

## 2017-11-06 RX ORDER — HEPARIN 100 UNIT/ML
500 SYRINGE INTRAVENOUS
Status: DISCONTINUED | OUTPATIENT
Start: 2017-11-06 | End: 2017-11-06 | Stop reason: HOSPADM

## 2017-11-06 RX ORDER — FAMOTIDINE 20 MG/50ML
20 INJECTION, SOLUTION INTRAVENOUS
Status: COMPLETED | OUTPATIENT
Start: 2017-11-06 | End: 2017-11-06

## 2017-11-06 RX ADMIN — DIPHENHYDRAMINE HYDROCHLORIDE 25 MG: 50 INJECTION INTRAMUSCULAR; INTRAVENOUS at 01:11

## 2017-11-06 RX ADMIN — FAMOTIDINE 20 MG: 20 INJECTION, SOLUTION INTRAVENOUS at 12:11

## 2017-11-06 RX ADMIN — SODIUM CHLORIDE, PRESERVATIVE FREE 10 ML: 5 INJECTION INTRAVENOUS at 12:11

## 2017-11-06 RX ADMIN — SODIUM CHLORIDE: 0.9 INJECTION, SOLUTION INTRAVENOUS at 12:11

## 2017-11-06 RX ADMIN — SODIUM CHLORIDE 16 MG: 9 INJECTION, SOLUTION INTRAVENOUS at 01:11

## 2017-11-06 RX ADMIN — DEXAMETHASONE SODIUM PHOSPHATE 10 MG: 4 INJECTION, SOLUTION INTRA-ARTICULAR; INTRALESIONAL; INTRAMUSCULAR; INTRAVENOUS; SOFT TISSUE at 01:11

## 2017-11-06 RX ADMIN — PACLITAXEL 114 MG: 6 INJECTION, SOLUTION, CONCENTRATE INTRAVENOUS at 02:11

## 2017-11-06 NOTE — PROGRESS NOTES
Nutrition: Weight check. Weight stable since starting new regimen of taxol. Kita Jimenez,MS, RD, LDN

## 2017-11-13 ENCOUNTER — INFUSION (OUTPATIENT)
Dept: INFUSION THERAPY | Facility: HOSPITAL | Age: 57
End: 2017-11-13
Attending: INTERNAL MEDICINE
Payer: COMMERCIAL

## 2017-11-13 VITALS
SYSTOLIC BLOOD PRESSURE: 116 MMHG | BODY MASS INDEX: 18.31 KG/M2 | HEART RATE: 80 BPM | DIASTOLIC BLOOD PRESSURE: 69 MMHG | HEIGHT: 62 IN | WEIGHT: 99.5 LBS | RESPIRATION RATE: 14 BRPM | TEMPERATURE: 98 F

## 2017-11-13 DIAGNOSIS — C50.112 MALIGNANT NEOPLASM OF CENTRAL PORTION OF LEFT BREAST IN FEMALE, ESTROGEN RECEPTOR NEGATIVE: Primary | ICD-10-CM

## 2017-11-13 DIAGNOSIS — Z17.1 MALIGNANT NEOPLASM OF CENTRAL PORTION OF LEFT BREAST IN FEMALE, ESTROGEN RECEPTOR NEGATIVE: Primary | ICD-10-CM

## 2017-11-13 PROCEDURE — 96367 TX/PROPH/DG ADDL SEQ IV INF: CPT | Mod: PN

## 2017-11-13 PROCEDURE — A4216 STERILE WATER/SALINE, 10 ML: HCPCS | Mod: PN | Performed by: NURSE PRACTITIONER

## 2017-11-13 PROCEDURE — 63600175 PHARM REV CODE 636 W HCPCS: Mod: PN | Performed by: NURSE PRACTITIONER

## 2017-11-13 PROCEDURE — 96375 TX/PRO/DX INJ NEW DRUG ADDON: CPT | Mod: PN

## 2017-11-13 PROCEDURE — S0028 INJECTION, FAMOTIDINE, 20 MG: HCPCS | Mod: PN | Performed by: NURSE PRACTITIONER

## 2017-11-13 PROCEDURE — 25000003 PHARM REV CODE 250: Mod: PN | Performed by: NURSE PRACTITIONER

## 2017-11-13 PROCEDURE — 96413 CHEMO IV INFUSION 1 HR: CPT | Mod: PN

## 2017-11-13 RX ORDER — HEPARIN 100 UNIT/ML
500 SYRINGE INTRAVENOUS
Status: DISCONTINUED | OUTPATIENT
Start: 2017-11-13 | End: 2017-11-13 | Stop reason: HOSPADM

## 2017-11-13 RX ORDER — SODIUM CHLORIDE 0.9 % (FLUSH) 0.9 %
10 SYRINGE (ML) INJECTION
Status: DISCONTINUED | OUTPATIENT
Start: 2017-11-13 | End: 2017-11-13 | Stop reason: HOSPADM

## 2017-11-13 RX ORDER — FAMOTIDINE 20 MG/50ML
20 INJECTION, SOLUTION INTRAVENOUS
Status: COMPLETED | OUTPATIENT
Start: 2017-11-13 | End: 2017-11-13

## 2017-11-13 RX ORDER — DIPHENHYDRAMINE HYDROCHLORIDE 50 MG/ML
25 INJECTION INTRAMUSCULAR; INTRAVENOUS
Status: COMPLETED | OUTPATIENT
Start: 2017-11-13 | End: 2017-11-13

## 2017-11-13 RX ADMIN — DEXAMETHASONE SODIUM PHOSPHATE 10 MG: 4 INJECTION, SOLUTION INTRA-ARTICULAR; INTRALESIONAL; INTRAMUSCULAR; INTRAVENOUS; SOFT TISSUE at 01:11

## 2017-11-13 RX ADMIN — DIPHENHYDRAMINE HYDROCHLORIDE 25 MG: 50 INJECTION INTRAMUSCULAR; INTRAVENOUS at 01:11

## 2017-11-13 RX ADMIN — PACLITAXEL 114 MG: 6 INJECTION, SOLUTION, CONCENTRATE INTRAVENOUS at 02:11

## 2017-11-13 RX ADMIN — HEPARIN 500 UNITS: 100 SYRINGE at 03:11

## 2017-11-13 RX ADMIN — SODIUM CHLORIDE 16 MG: 9 INJECTION, SOLUTION INTRAVENOUS at 01:11

## 2017-11-13 RX ADMIN — SODIUM CHLORIDE: 0.9 INJECTION, SOLUTION INTRAVENOUS at 01:11

## 2017-11-13 RX ADMIN — SODIUM CHLORIDE, PRESERVATIVE FREE 10 ML: 5 INJECTION INTRAVENOUS at 03:11

## 2017-11-13 RX ADMIN — FAMOTIDINE 20 MG: 20 INJECTION, SOLUTION INTRAVENOUS at 02:11

## 2017-11-13 NOTE — PLAN OF CARE
Problem: Patient Care Overview  Goal: Plan of Care Review  Outcome: Ongoing (interventions implemented as appropriate)  Pt tolerated weekly Taxol infusion well.  No s/s of infusion reaction noted.  Instructed to call MD with any problems.

## 2017-11-20 ENCOUNTER — INFUSION (OUTPATIENT)
Dept: INFUSION THERAPY | Facility: HOSPITAL | Age: 57
End: 2017-11-20
Attending: INTERNAL MEDICINE
Payer: COMMERCIAL

## 2017-11-20 VITALS
OXYGEN SATURATION: 99 % | DIASTOLIC BLOOD PRESSURE: 59 MMHG | SYSTOLIC BLOOD PRESSURE: 105 MMHG | BODY MASS INDEX: 18.71 KG/M2 | WEIGHT: 101.69 LBS | HEIGHT: 62 IN | TEMPERATURE: 98 F | HEART RATE: 89 BPM | RESPIRATION RATE: 16 BRPM

## 2017-11-20 DIAGNOSIS — C50.112 MALIGNANT NEOPLASM OF CENTRAL PORTION OF LEFT BREAST IN FEMALE, ESTROGEN RECEPTOR NEGATIVE: Primary | ICD-10-CM

## 2017-11-20 DIAGNOSIS — Z17.1 MALIGNANT NEOPLASM OF CENTRAL PORTION OF LEFT BREAST IN FEMALE, ESTROGEN RECEPTOR NEGATIVE: Primary | ICD-10-CM

## 2017-11-20 PROCEDURE — A4216 STERILE WATER/SALINE, 10 ML: HCPCS | Mod: PN | Performed by: NURSE PRACTITIONER

## 2017-11-20 PROCEDURE — 96413 CHEMO IV INFUSION 1 HR: CPT | Mod: PN

## 2017-11-20 PROCEDURE — 96375 TX/PRO/DX INJ NEW DRUG ADDON: CPT | Mod: PN

## 2017-11-20 PROCEDURE — 25000003 PHARM REV CODE 250: Mod: PN | Performed by: NURSE PRACTITIONER

## 2017-11-20 PROCEDURE — 96367 TX/PROPH/DG ADDL SEQ IV INF: CPT | Mod: PN

## 2017-11-20 PROCEDURE — 63600175 PHARM REV CODE 636 W HCPCS: Mod: PN | Performed by: NURSE PRACTITIONER

## 2017-11-20 PROCEDURE — S0028 INJECTION, FAMOTIDINE, 20 MG: HCPCS | Mod: PN | Performed by: NURSE PRACTITIONER

## 2017-11-20 RX ORDER — DIPHENHYDRAMINE HYDROCHLORIDE 50 MG/ML
25 INJECTION INTRAMUSCULAR; INTRAVENOUS
Status: COMPLETED | OUTPATIENT
Start: 2017-11-20 | End: 2017-11-20

## 2017-11-20 RX ORDER — FAMOTIDINE 20 MG/50ML
20 INJECTION, SOLUTION INTRAVENOUS
Status: COMPLETED | OUTPATIENT
Start: 2017-11-20 | End: 2017-11-20

## 2017-11-20 RX ORDER — SODIUM CHLORIDE 0.9 % (FLUSH) 0.9 %
10 SYRINGE (ML) INJECTION
Status: DISCONTINUED | OUTPATIENT
Start: 2017-11-20 | End: 2017-11-20 | Stop reason: HOSPADM

## 2017-11-20 RX ORDER — HEPARIN 100 UNIT/ML
500 SYRINGE INTRAVENOUS
Status: DISCONTINUED | OUTPATIENT
Start: 2017-11-20 | End: 2017-11-20 | Stop reason: HOSPADM

## 2017-11-20 RX ADMIN — FAMOTIDINE 20 MG: 20 INJECTION, SOLUTION INTRAVENOUS at 01:11

## 2017-11-20 RX ADMIN — DEXAMETHASONE SODIUM PHOSPHATE 10 MG: 4 INJECTION, SOLUTION INTRA-ARTICULAR; INTRALESIONAL; INTRAMUSCULAR; INTRAVENOUS; SOFT TISSUE at 12:11

## 2017-11-20 RX ADMIN — DIPHENHYDRAMINE HYDROCHLORIDE 25 MG: 50 INJECTION INTRAMUSCULAR; INTRAVENOUS at 12:11

## 2017-11-20 RX ADMIN — SODIUM CHLORIDE: 0.9 INJECTION, SOLUTION INTRAVENOUS at 12:11

## 2017-11-20 RX ADMIN — SODIUM CHLORIDE 16 MG: 9 INJECTION, SOLUTION INTRAVENOUS at 01:11

## 2017-11-20 RX ADMIN — PACLITAXEL 114 MG: 6 INJECTION, SOLUTION, CONCENTRATE INTRAVENOUS at 01:11

## 2017-11-20 RX ADMIN — Medication 10 ML: at 12:11

## 2017-11-20 RX ADMIN — HEPARIN SODIUM (PORCINE) LOCK FLUSH IV SOLN 100 UNIT/ML 500 UNITS: 100 SOLUTION at 03:11

## 2017-11-20 RX ADMIN — Medication 10 ML: at 03:11

## 2017-11-20 NOTE — PLAN OF CARE
Problem: Patient Care Overview  Goal: Plan of Care Review  Outcome: Ongoing (interventions implemented as appropriate)  Adequate for discharge.   Pt tolerated infusion without noted distress.  Reviewed upcoming appointments.  All questions answered.  Ambulated from infusion center independently by self.

## 2017-11-27 ENCOUNTER — INFUSION (OUTPATIENT)
Dept: INFUSION THERAPY | Facility: HOSPITAL | Age: 57
End: 2017-11-27
Attending: INTERNAL MEDICINE
Payer: COMMERCIAL

## 2017-11-27 VITALS
WEIGHT: 101.38 LBS | TEMPERATURE: 99 F | RESPIRATION RATE: 16 BRPM | OXYGEN SATURATION: 99 % | HEART RATE: 72 BPM | BODY MASS INDEX: 18.66 KG/M2 | SYSTOLIC BLOOD PRESSURE: 95 MMHG | HEIGHT: 62 IN | DIASTOLIC BLOOD PRESSURE: 56 MMHG

## 2017-11-27 DIAGNOSIS — Z17.1 MALIGNANT NEOPLASM OF CENTRAL PORTION OF LEFT BREAST IN FEMALE, ESTROGEN RECEPTOR NEGATIVE: Primary | ICD-10-CM

## 2017-11-27 DIAGNOSIS — C50.112 MALIGNANT NEOPLASM OF CENTRAL PORTION OF LEFT BREAST IN FEMALE, ESTROGEN RECEPTOR NEGATIVE: Primary | ICD-10-CM

## 2017-11-27 PROCEDURE — 25000003 PHARM REV CODE 250: Mod: PN | Performed by: NURSE PRACTITIONER

## 2017-11-27 PROCEDURE — 63600175 PHARM REV CODE 636 W HCPCS: Mod: PN | Performed by: NURSE PRACTITIONER

## 2017-11-27 PROCEDURE — 96367 TX/PROPH/DG ADDL SEQ IV INF: CPT | Mod: PN

## 2017-11-27 PROCEDURE — 96413 CHEMO IV INFUSION 1 HR: CPT | Mod: PN

## 2017-11-27 PROCEDURE — 96375 TX/PRO/DX INJ NEW DRUG ADDON: CPT | Mod: PN

## 2017-11-27 PROCEDURE — A4216 STERILE WATER/SALINE, 10 ML: HCPCS | Mod: PN | Performed by: NURSE PRACTITIONER

## 2017-11-27 PROCEDURE — S0028 INJECTION, FAMOTIDINE, 20 MG: HCPCS | Mod: PN | Performed by: NURSE PRACTITIONER

## 2017-11-27 RX ORDER — FAMOTIDINE 20 MG/50ML
20 INJECTION, SOLUTION INTRAVENOUS
Status: COMPLETED | OUTPATIENT
Start: 2017-11-27 | End: 2017-11-27

## 2017-11-27 RX ORDER — METRONIDAZOLE 500 MG/1
TABLET ORAL
Refills: 0 | COMMUNITY
Start: 2017-09-10 | End: 2018-01-08 | Stop reason: ALTCHOICE

## 2017-11-27 RX ORDER — SODIUM CHLORIDE 0.9 % (FLUSH) 0.9 %
10 SYRINGE (ML) INJECTION
Status: DISCONTINUED | OUTPATIENT
Start: 2017-11-27 | End: 2017-11-27 | Stop reason: HOSPADM

## 2017-11-27 RX ORDER — HEPARIN 100 UNIT/ML
500 SYRINGE INTRAVENOUS
Status: DISCONTINUED | OUTPATIENT
Start: 2017-11-27 | End: 2017-11-27 | Stop reason: HOSPADM

## 2017-11-27 RX ORDER — CITALOPRAM 10 MG/1
10 TABLET ORAL DAILY
Refills: 5 | COMMUNITY
Start: 2017-11-07 | End: 2019-11-26 | Stop reason: SDUPTHER

## 2017-11-27 RX ORDER — HYDROCODONE BITARTRATE AND ACETAMINOPHEN 5; 325 MG/1; MG/1
TABLET ORAL
COMMUNITY
Start: 2017-08-29 | End: 2018-10-15

## 2017-11-27 RX ORDER — DIPHENHYDRAMINE HYDROCHLORIDE 50 MG/ML
25 INJECTION INTRAMUSCULAR; INTRAVENOUS
Status: COMPLETED | OUTPATIENT
Start: 2017-11-27 | End: 2017-11-27

## 2017-11-27 RX ADMIN — SODIUM CHLORIDE, PRESERVATIVE FREE 10 ML: 5 INJECTION INTRAVENOUS at 02:11

## 2017-11-27 RX ADMIN — SODIUM CHLORIDE: 900 INJECTION, SOLUTION INTRAVENOUS at 12:11

## 2017-11-27 RX ADMIN — SODIUM CHLORIDE 16 MG: 9 INJECTION, SOLUTION INTRAVENOUS at 12:11

## 2017-11-27 RX ADMIN — HEPARIN 500 UNITS: 100 SYRINGE at 02:11

## 2017-11-27 RX ADMIN — DEXAMETHASONE SODIUM PHOSPHATE 10 MG: 4 INJECTION, SOLUTION INTRA-ARTICULAR; INTRALESIONAL; INTRAMUSCULAR; INTRAVENOUS; SOFT TISSUE at 01:11

## 2017-11-27 RX ADMIN — DIPHENHYDRAMINE HYDROCHLORIDE 25 MG: 50 INJECTION INTRAMUSCULAR; INTRAVENOUS at 12:11

## 2017-11-27 RX ADMIN — SODIUM CHLORIDE, PRESERVATIVE FREE 10 ML: 5 INJECTION INTRAVENOUS at 12:11

## 2017-11-27 RX ADMIN — PACLITAXEL 114 MG: 6 INJECTION, SOLUTION, CONCENTRATE INTRAVENOUS at 01:11

## 2017-11-27 RX ADMIN — FAMOTIDINE 20 MG: 20 INJECTION, SOLUTION INTRAVENOUS at 12:11

## 2017-11-27 NOTE — PLAN OF CARE
Problem: Patient Care Overview  Goal: Plan of Care Review  Outcome: Ongoing (interventions implemented as appropriate)  Pt received Taxol, tolerated well, AVS given to pt, ambulated from clinic, no distress noted

## 2017-12-04 ENCOUNTER — INFUSION (OUTPATIENT)
Dept: INFUSION THERAPY | Facility: HOSPITAL | Age: 57
End: 2017-12-04
Attending: INTERNAL MEDICINE
Payer: COMMERCIAL

## 2017-12-04 VITALS
DIASTOLIC BLOOD PRESSURE: 58 MMHG | HEART RATE: 79 BPM | SYSTOLIC BLOOD PRESSURE: 96 MMHG | RESPIRATION RATE: 16 BRPM | TEMPERATURE: 98 F | HEIGHT: 62 IN | WEIGHT: 101 LBS | BODY MASS INDEX: 18.58 KG/M2

## 2017-12-04 DIAGNOSIS — C50.112 MALIGNANT NEOPLASM OF CENTRAL PORTION OF LEFT BREAST IN FEMALE, ESTROGEN RECEPTOR NEGATIVE: Primary | ICD-10-CM

## 2017-12-04 DIAGNOSIS — Z17.1 MALIGNANT NEOPLASM OF CENTRAL PORTION OF LEFT BREAST IN FEMALE, ESTROGEN RECEPTOR NEGATIVE: Primary | ICD-10-CM

## 2017-12-04 PROCEDURE — 96367 TX/PROPH/DG ADDL SEQ IV INF: CPT | Mod: PN

## 2017-12-04 PROCEDURE — A4216 STERILE WATER/SALINE, 10 ML: HCPCS | Mod: PN | Performed by: NURSE PRACTITIONER

## 2017-12-04 PROCEDURE — 96413 CHEMO IV INFUSION 1 HR: CPT | Mod: PN

## 2017-12-04 PROCEDURE — 25000003 PHARM REV CODE 250: Mod: PN | Performed by: NURSE PRACTITIONER

## 2017-12-04 PROCEDURE — 25000003 PHARM REV CODE 250: Mod: PN | Performed by: INTERNAL MEDICINE

## 2017-12-04 PROCEDURE — S0028 INJECTION, FAMOTIDINE, 20 MG: HCPCS | Mod: PN | Performed by: INTERNAL MEDICINE

## 2017-12-04 PROCEDURE — 63600175 PHARM REV CODE 636 W HCPCS: Mod: PN | Performed by: NURSE PRACTITIONER

## 2017-12-04 PROCEDURE — 96375 TX/PRO/DX INJ NEW DRUG ADDON: CPT | Mod: PN

## 2017-12-04 RX ORDER — DIPHENHYDRAMINE HYDROCHLORIDE 50 MG/ML
25 INJECTION INTRAMUSCULAR; INTRAVENOUS
Status: COMPLETED | OUTPATIENT
Start: 2017-12-04 | End: 2017-12-04

## 2017-12-04 RX ORDER — HEPARIN 100 UNIT/ML
500 SYRINGE INTRAVENOUS
Status: DISCONTINUED | OUTPATIENT
Start: 2017-12-04 | End: 2017-12-04 | Stop reason: HOSPADM

## 2017-12-04 RX ORDER — FAMOTIDINE 10 MG/ML
20 INJECTION INTRAVENOUS 2 TIMES DAILY
Status: DISCONTINUED | OUTPATIENT
Start: 2017-12-04 | End: 2017-12-04 | Stop reason: HOSPADM

## 2017-12-04 RX ORDER — SODIUM CHLORIDE 0.9 % (FLUSH) 0.9 %
10 SYRINGE (ML) INJECTION
Status: DISCONTINUED | OUTPATIENT
Start: 2017-12-04 | End: 2017-12-04 | Stop reason: HOSPADM

## 2017-12-04 RX ADMIN — SODIUM CHLORIDE: 900 INJECTION, SOLUTION INTRAVENOUS at 01:12

## 2017-12-04 RX ADMIN — Medication 10 ML: at 03:12

## 2017-12-04 RX ADMIN — DEXAMETHASONE SODIUM PHOSPHATE 10 MG: 4 INJECTION, SOLUTION INTRA-ARTICULAR; INTRALESIONAL; INTRAMUSCULAR; INTRAVENOUS; SOFT TISSUE at 02:12

## 2017-12-04 RX ADMIN — DIPHENHYDRAMINE HYDROCHLORIDE 25 MG: 50 INJECTION INTRAMUSCULAR; INTRAVENOUS at 01:12

## 2017-12-04 RX ADMIN — FAMOTIDINE 20 MG: 10 INJECTION, SOLUTION INTRAVENOUS at 01:12

## 2017-12-04 RX ADMIN — HEPARIN SODIUM (PORCINE) LOCK FLUSH IV SOLN 100 UNIT/ML 500 UNITS: 100 SOLUTION at 03:12

## 2017-12-04 RX ADMIN — Medication 10 ML: at 01:12

## 2017-12-04 RX ADMIN — PACLITAXEL 114 MG: 6 INJECTION, SOLUTION, CONCENTRATE INTRAVENOUS at 02:12

## 2017-12-04 RX ADMIN — SODIUM CHLORIDE 16 MG: 9 INJECTION, SOLUTION INTRAVENOUS at 01:12

## 2017-12-04 NOTE — PLAN OF CARE
Problem: Patient Care Overview  Goal: Plan of Care Review  Outcome: Ongoing (interventions implemented as appropriate)  Adequate for discharge.   Pt tolerated infusion without noted distress.  Reviewed upcoming appointments.  All questions answered.  Ambulated from infusion center independently with .

## 2017-12-11 ENCOUNTER — INFUSION (OUTPATIENT)
Dept: INFUSION THERAPY | Facility: HOSPITAL | Age: 57
End: 2017-12-11
Attending: INTERNAL MEDICINE
Payer: COMMERCIAL

## 2017-12-11 VITALS
SYSTOLIC BLOOD PRESSURE: 102 MMHG | HEART RATE: 75 BPM | RESPIRATION RATE: 16 BRPM | HEIGHT: 62 IN | OXYGEN SATURATION: 100 % | TEMPERATURE: 98 F | BODY MASS INDEX: 18.71 KG/M2 | WEIGHT: 101.69 LBS | DIASTOLIC BLOOD PRESSURE: 64 MMHG

## 2017-12-11 DIAGNOSIS — C50.112 MALIGNANT NEOPLASM OF CENTRAL PORTION OF LEFT BREAST IN FEMALE, ESTROGEN RECEPTOR NEGATIVE: Primary | ICD-10-CM

## 2017-12-11 DIAGNOSIS — Z17.1 MALIGNANT NEOPLASM OF CENTRAL PORTION OF LEFT BREAST IN FEMALE, ESTROGEN RECEPTOR NEGATIVE: Primary | ICD-10-CM

## 2017-12-11 PROCEDURE — A4216 STERILE WATER/SALINE, 10 ML: HCPCS | Mod: PN | Performed by: PHYSICIAN ASSISTANT

## 2017-12-11 PROCEDURE — S0028 INJECTION, FAMOTIDINE, 20 MG: HCPCS | Mod: PN | Performed by: PHYSICIAN ASSISTANT

## 2017-12-11 PROCEDURE — 96375 TX/PRO/DX INJ NEW DRUG ADDON: CPT | Mod: PN

## 2017-12-11 PROCEDURE — 96413 CHEMO IV INFUSION 1 HR: CPT | Mod: PN

## 2017-12-11 PROCEDURE — 25000003 PHARM REV CODE 250: Mod: PN | Performed by: PHYSICIAN ASSISTANT

## 2017-12-11 PROCEDURE — 96367 TX/PROPH/DG ADDL SEQ IV INF: CPT | Mod: PN

## 2017-12-11 PROCEDURE — 63600175 PHARM REV CODE 636 W HCPCS: Mod: PN | Performed by: INTERNAL MEDICINE

## 2017-12-11 PROCEDURE — 63600175 PHARM REV CODE 636 W HCPCS: Mod: PN | Performed by: PHYSICIAN ASSISTANT

## 2017-12-11 RX ORDER — DIPHENHYDRAMINE HYDROCHLORIDE 50 MG/ML
25 INJECTION INTRAMUSCULAR; INTRAVENOUS
Status: COMPLETED | OUTPATIENT
Start: 2017-12-11 | End: 2017-12-11

## 2017-12-11 RX ORDER — HEPARIN 100 UNIT/ML
500 SYRINGE INTRAVENOUS
Status: DISCONTINUED | OUTPATIENT
Start: 2017-12-11 | End: 2017-12-11 | Stop reason: HOSPADM

## 2017-12-11 RX ORDER — LORAZEPAM 2 MG/ML
1 INJECTION INTRAMUSCULAR
Status: DISCONTINUED | OUTPATIENT
Start: 2017-12-11 | End: 2017-12-11 | Stop reason: HOSPADM

## 2017-12-11 RX ORDER — SODIUM CHLORIDE 0.9 % (FLUSH) 0.9 %
10 SYRINGE (ML) INJECTION
Status: DISCONTINUED | OUTPATIENT
Start: 2017-12-11 | End: 2017-12-11 | Stop reason: HOSPADM

## 2017-12-11 RX ORDER — FAMOTIDINE 10 MG/ML
20 INJECTION INTRAVENOUS 2 TIMES DAILY
Status: DISCONTINUED | OUTPATIENT
Start: 2017-12-11 | End: 2017-12-11 | Stop reason: HOSPADM

## 2017-12-11 RX ADMIN — SODIUM CHLORIDE, PRESERVATIVE FREE 10 ML: 5 INJECTION INTRAVENOUS at 01:12

## 2017-12-11 RX ADMIN — LORAZEPAM 1 MG: 2 INJECTION INTRAMUSCULAR; INTRAVENOUS at 01:12

## 2017-12-11 RX ADMIN — DIPHENHYDRAMINE HYDROCHLORIDE 25 MG: 50 INJECTION INTRAMUSCULAR; INTRAVENOUS at 01:12

## 2017-12-11 RX ADMIN — FAMOTIDINE 20 MG: 10 INJECTION, SOLUTION INTRAVENOUS at 01:12

## 2017-12-11 RX ADMIN — HEPARIN 500 UNITS: 100 SYRINGE at 03:12

## 2017-12-11 RX ADMIN — SODIUM CHLORIDE 16 MG: 9 INJECTION, SOLUTION INTRAVENOUS at 01:12

## 2017-12-11 RX ADMIN — PACLITAXEL 114 MG: 6 INJECTION, SOLUTION, CONCENTRATE INTRAVENOUS at 02:12

## 2017-12-11 RX ADMIN — DEXAMETHASONE SODIUM PHOSPHATE 10 MG: 4 INJECTION, SOLUTION INTRA-ARTICULAR; INTRALESIONAL; INTRAMUSCULAR; INTRAVENOUS; SOFT TISSUE at 01:12

## 2017-12-11 RX ADMIN — SODIUM CHLORIDE: 0.9 INJECTION, SOLUTION INTRAVENOUS at 01:12

## 2017-12-11 NOTE — PLAN OF CARE
Problem: Patient Care Overview  Goal: Plan of Care Review  Outcome: Ongoing (interventions implemented as appropriate)  Pt tolerated Taxol infusion without any difficulty. Instructed pt to call for questions or concerns and to return to clinic as directed for next treatment. Pt verbalized understanding. AVS printed and schedule given to pt

## 2017-12-18 ENCOUNTER — INFUSION (OUTPATIENT)
Dept: INFUSION THERAPY | Facility: HOSPITAL | Age: 57
End: 2017-12-18
Attending: INTERNAL MEDICINE
Payer: COMMERCIAL

## 2017-12-18 VITALS
RESPIRATION RATE: 16 BRPM | TEMPERATURE: 98 F | SYSTOLIC BLOOD PRESSURE: 97 MMHG | WEIGHT: 101.94 LBS | DIASTOLIC BLOOD PRESSURE: 61 MMHG | HEIGHT: 62 IN | BODY MASS INDEX: 18.76 KG/M2 | HEART RATE: 76 BPM

## 2017-12-18 DIAGNOSIS — Z17.1 MALIGNANT NEOPLASM OF CENTRAL PORTION OF LEFT BREAST IN FEMALE, ESTROGEN RECEPTOR NEGATIVE: Primary | ICD-10-CM

## 2017-12-18 DIAGNOSIS — C50.112 MALIGNANT NEOPLASM OF CENTRAL PORTION OF LEFT BREAST IN FEMALE, ESTROGEN RECEPTOR NEGATIVE: Primary | ICD-10-CM

## 2017-12-18 PROCEDURE — 96375 TX/PRO/DX INJ NEW DRUG ADDON: CPT | Mod: PN

## 2017-12-18 PROCEDURE — 25000003 PHARM REV CODE 250: Mod: PN | Performed by: PHYSICIAN ASSISTANT

## 2017-12-18 PROCEDURE — 63600175 PHARM REV CODE 636 W HCPCS: Mod: PN | Performed by: INTERNAL MEDICINE

## 2017-12-18 PROCEDURE — A4216 STERILE WATER/SALINE, 10 ML: HCPCS | Mod: PN | Performed by: PHYSICIAN ASSISTANT

## 2017-12-18 PROCEDURE — 96367 TX/PROPH/DG ADDL SEQ IV INF: CPT | Mod: PN

## 2017-12-18 PROCEDURE — 63600175 PHARM REV CODE 636 W HCPCS: Mod: PN | Performed by: PHYSICIAN ASSISTANT

## 2017-12-18 PROCEDURE — S0028 INJECTION, FAMOTIDINE, 20 MG: HCPCS | Mod: PN | Performed by: PHYSICIAN ASSISTANT

## 2017-12-18 PROCEDURE — 96413 CHEMO IV INFUSION 1 HR: CPT | Mod: PN

## 2017-12-18 RX ORDER — SODIUM CHLORIDE 0.9 % (FLUSH) 0.9 %
10 SYRINGE (ML) INJECTION
Status: DISCONTINUED | OUTPATIENT
Start: 2017-12-18 | End: 2017-12-18 | Stop reason: HOSPADM

## 2017-12-18 RX ORDER — LORAZEPAM 2 MG/ML
1 INJECTION INTRAMUSCULAR
Status: DISCONTINUED | OUTPATIENT
Start: 2017-12-18 | End: 2017-12-18 | Stop reason: HOSPADM

## 2017-12-18 RX ORDER — DIPHENHYDRAMINE HYDROCHLORIDE 50 MG/ML
25 INJECTION INTRAMUSCULAR; INTRAVENOUS
Status: COMPLETED | OUTPATIENT
Start: 2017-12-18 | End: 2017-12-18

## 2017-12-18 RX ORDER — FAMOTIDINE 10 MG/ML
20 INJECTION INTRAVENOUS 2 TIMES DAILY
Status: DISCONTINUED | OUTPATIENT
Start: 2017-12-18 | End: 2017-12-18 | Stop reason: HOSPADM

## 2017-12-18 RX ORDER — HEPARIN 100 UNIT/ML
500 SYRINGE INTRAVENOUS
Status: DISCONTINUED | OUTPATIENT
Start: 2017-12-18 | End: 2017-12-18 | Stop reason: HOSPADM

## 2017-12-18 RX ADMIN — DEXAMETHASONE SODIUM PHOSPHATE 10 MG: 4 INJECTION, SOLUTION INTRA-ARTICULAR; INTRALESIONAL; INTRAMUSCULAR; INTRAVENOUS; SOFT TISSUE at 01:12

## 2017-12-18 RX ADMIN — LORAZEPAM 1 MG: 2 INJECTION INTRAMUSCULAR; INTRAVENOUS at 02:12

## 2017-12-18 RX ADMIN — SODIUM CHLORIDE, PRESERVATIVE FREE 10 ML: 5 INJECTION INTRAVENOUS at 03:12

## 2017-12-18 RX ADMIN — SODIUM CHLORIDE 16 MG: 9 INJECTION, SOLUTION INTRAVENOUS at 02:12

## 2017-12-18 RX ADMIN — SODIUM CHLORIDE, PRESERVATIVE FREE 10 ML: 5 INJECTION INTRAVENOUS at 01:12

## 2017-12-18 RX ADMIN — DIPHENHYDRAMINE HYDROCHLORIDE 25 MG: 50 INJECTION INTRAMUSCULAR; INTRAVENOUS at 01:12

## 2017-12-18 RX ADMIN — SODIUM CHLORIDE: 900 INJECTION, SOLUTION INTRAVENOUS at 02:12

## 2017-12-18 RX ADMIN — FAMOTIDINE 20 MG: 10 INJECTION, SOLUTION INTRAVENOUS at 01:12

## 2017-12-18 RX ADMIN — HEPARIN 500 UNITS: 100 SYRINGE at 03:12

## 2017-12-18 RX ADMIN — PACLITAXEL 114 MG: 6 INJECTION, SOLUTION, CONCENTRATE INTRAVENOUS at 02:12

## 2017-12-18 NOTE — PLAN OF CARE
Problem: Patient Care Overview  Goal: Plan of Care Review  Outcome: Ongoing (interventions implemented as appropriate)  Pt. Completed treatment, tolerated without noted distress.Vtial signs stable. Patient discharged from infusion center ambulatory with . Patient and  had all present questions answered.

## 2017-12-27 ENCOUNTER — INFUSION (OUTPATIENT)
Dept: INFUSION THERAPY | Facility: HOSPITAL | Age: 57
End: 2017-12-27
Attending: INTERNAL MEDICINE
Payer: COMMERCIAL

## 2017-12-27 VITALS
WEIGHT: 102.13 LBS | RESPIRATION RATE: 16 BRPM | BODY MASS INDEX: 18.67 KG/M2 | HEART RATE: 62 BPM | TEMPERATURE: 98 F | DIASTOLIC BLOOD PRESSURE: 62 MMHG | SYSTOLIC BLOOD PRESSURE: 113 MMHG

## 2017-12-27 DIAGNOSIS — C50.112 MALIGNANT NEOPLASM OF CENTRAL PORTION OF LEFT BREAST IN FEMALE, ESTROGEN RECEPTOR NEGATIVE: Primary | ICD-10-CM

## 2017-12-27 DIAGNOSIS — Z17.1 MALIGNANT NEOPLASM OF CENTRAL PORTION OF LEFT BREAST IN FEMALE, ESTROGEN RECEPTOR NEGATIVE: Primary | ICD-10-CM

## 2017-12-27 PROCEDURE — 25000003 PHARM REV CODE 250: Mod: PN | Performed by: PHYSICIAN ASSISTANT

## 2017-12-27 PROCEDURE — A4216 STERILE WATER/SALINE, 10 ML: HCPCS | Mod: PN | Performed by: PHYSICIAN ASSISTANT

## 2017-12-27 PROCEDURE — 63600175 PHARM REV CODE 636 W HCPCS: Mod: PN | Performed by: PHYSICIAN ASSISTANT

## 2017-12-27 PROCEDURE — 96367 TX/PROPH/DG ADDL SEQ IV INF: CPT | Mod: PN

## 2017-12-27 PROCEDURE — 96413 CHEMO IV INFUSION 1 HR: CPT | Mod: PN

## 2017-12-27 PROCEDURE — S0028 INJECTION, FAMOTIDINE, 20 MG: HCPCS | Mod: PN | Performed by: PHYSICIAN ASSISTANT

## 2017-12-27 PROCEDURE — 96375 TX/PRO/DX INJ NEW DRUG ADDON: CPT | Mod: PN

## 2017-12-27 RX ORDER — LORAZEPAM 2 MG/ML
1 INJECTION INTRAMUSCULAR
Status: DISCONTINUED | OUTPATIENT
Start: 2017-12-27 | End: 2017-12-27 | Stop reason: HOSPADM

## 2017-12-27 RX ORDER — HEPARIN 100 UNIT/ML
500 SYRINGE INTRAVENOUS
Status: DISCONTINUED | OUTPATIENT
Start: 2017-12-27 | End: 2017-12-27 | Stop reason: HOSPADM

## 2017-12-27 RX ORDER — FAMOTIDINE 10 MG/ML
20 INJECTION INTRAVENOUS
Status: COMPLETED | OUTPATIENT
Start: 2017-12-27 | End: 2017-12-27

## 2017-12-27 RX ORDER — SODIUM CHLORIDE 0.9 % (FLUSH) 0.9 %
10 SYRINGE (ML) INJECTION
Status: DISCONTINUED | OUTPATIENT
Start: 2017-12-27 | End: 2017-12-27 | Stop reason: HOSPADM

## 2017-12-27 RX ORDER — DIPHENHYDRAMINE HYDROCHLORIDE 50 MG/ML
25 INJECTION INTRAMUSCULAR; INTRAVENOUS
Status: COMPLETED | OUTPATIENT
Start: 2017-12-27 | End: 2017-12-27

## 2017-12-27 RX ADMIN — DEXAMETHASONE SODIUM PHOSPHATE 10 MG: 4 INJECTION, SOLUTION INTRA-ARTICULAR; INTRALESIONAL; INTRAMUSCULAR; INTRAVENOUS; SOFT TISSUE at 02:12

## 2017-12-27 RX ADMIN — LORAZEPAM 1 MG: 2 INJECTION INTRAMUSCULAR; INTRAVENOUS at 01:12

## 2017-12-27 RX ADMIN — FAMOTIDINE 20 MG: 10 INJECTION, SOLUTION INTRAVENOUS at 01:12

## 2017-12-27 RX ADMIN — DIPHENHYDRAMINE HYDROCHLORIDE 25 MG: 50 INJECTION INTRAMUSCULAR; INTRAVENOUS at 01:12

## 2017-12-27 RX ADMIN — PACLITAXEL 114 MG: 6 INJECTION, SOLUTION, CONCENTRATE INTRAVENOUS at 02:12

## 2017-12-27 RX ADMIN — Medication 10 ML: at 01:12

## 2017-12-27 RX ADMIN — SODIUM CHLORIDE 16 MG: 9 INJECTION, SOLUTION INTRAVENOUS at 02:12

## 2017-12-27 RX ADMIN — SODIUM CHLORIDE: 900 INJECTION, SOLUTION INTRAVENOUS at 02:12

## 2017-12-27 NOTE — PLAN OF CARE
Problem: Patient Care Overview  Goal: Plan of Care Review  Outcome: Ongoing (interventions implemented as appropriate)  Pt tolerated infusion well

## 2018-01-02 ENCOUNTER — INFUSION (OUTPATIENT)
Dept: INFUSION THERAPY | Facility: HOSPITAL | Age: 58
End: 2018-01-02
Attending: INTERNAL MEDICINE
Payer: COMMERCIAL

## 2018-01-02 ENCOUNTER — DOCUMENTATION ONLY (OUTPATIENT)
Dept: INFUSION THERAPY | Facility: HOSPITAL | Age: 58
End: 2018-01-02

## 2018-01-02 VITALS
SYSTOLIC BLOOD PRESSURE: 99 MMHG | DIASTOLIC BLOOD PRESSURE: 59 MMHG | RESPIRATION RATE: 18 BRPM | OXYGEN SATURATION: 99 % | BODY MASS INDEX: 18.56 KG/M2 | TEMPERATURE: 98 F | WEIGHT: 100.88 LBS | HEIGHT: 62 IN | HEART RATE: 84 BPM

## 2018-01-02 DIAGNOSIS — Z17.1 MALIGNANT NEOPLASM OF CENTRAL PORTION OF LEFT BREAST IN FEMALE, ESTROGEN RECEPTOR NEGATIVE: Primary | ICD-10-CM

## 2018-01-02 DIAGNOSIS — C50.112 MALIGNANT NEOPLASM OF CENTRAL PORTION OF LEFT BREAST IN FEMALE, ESTROGEN RECEPTOR NEGATIVE: Primary | ICD-10-CM

## 2018-01-02 LAB
ALBUMIN SERPL BCP-MCNC: 4 G/DL
ALP SERPL-CCNC: 60 U/L
ALT SERPL W/O P-5'-P-CCNC: 42 U/L
ANION GAP SERPL CALC-SCNC: 7 MMOL/L
AST SERPL-CCNC: 29 U/L
BILIRUB SERPL-MCNC: 0.3 MG/DL
BUN SERPL-MCNC: 15 MG/DL
CALCIUM SERPL-MCNC: 9 MG/DL
CHLORIDE SERPL-SCNC: 103 MMOL/L
CO2 SERPL-SCNC: 28 MMOL/L
CREAT SERPL-MCNC: 0.49 MG/DL
EST. GFR  (AFRICAN AMERICAN): >60 ML/MIN/1.73 M^2
EST. GFR  (NON AFRICAN AMERICAN): >60 ML/MIN/1.73 M^2
GLUCOSE SERPL-MCNC: 84 MG/DL
POTASSIUM SERPL-SCNC: 3.9 MMOL/L
PROT SERPL-MCNC: 6.6 G/DL
SODIUM SERPL-SCNC: 138 MMOL/L

## 2018-01-02 PROCEDURE — 63600175 PHARM REV CODE 636 W HCPCS: Mod: PN | Performed by: INTERNAL MEDICINE

## 2018-01-02 PROCEDURE — S0028 INJECTION, FAMOTIDINE, 20 MG: HCPCS | Mod: PN | Performed by: NURSE PRACTITIONER

## 2018-01-02 PROCEDURE — 96375 TX/PRO/DX INJ NEW DRUG ADDON: CPT | Mod: PN

## 2018-01-02 PROCEDURE — 63600175 PHARM REV CODE 636 W HCPCS: Mod: PN | Performed by: NURSE PRACTITIONER

## 2018-01-02 PROCEDURE — 96413 CHEMO IV INFUSION 1 HR: CPT | Mod: PN

## 2018-01-02 PROCEDURE — 80053 COMPREHEN METABOLIC PANEL: CPT | Mod: PN

## 2018-01-02 PROCEDURE — A4216 STERILE WATER/SALINE, 10 ML: HCPCS | Mod: PN | Performed by: NURSE PRACTITIONER

## 2018-01-02 PROCEDURE — 25000003 PHARM REV CODE 250: Mod: PN | Performed by: NURSE PRACTITIONER

## 2018-01-02 PROCEDURE — 96367 TX/PROPH/DG ADDL SEQ IV INF: CPT | Mod: PN

## 2018-01-02 PROCEDURE — 25000003 PHARM REV CODE 250: Mod: PN | Performed by: INTERNAL MEDICINE

## 2018-01-02 PROCEDURE — 80053 COMPREHEN METABOLIC PANEL: CPT

## 2018-01-02 RX ORDER — DIPHENHYDRAMINE HYDROCHLORIDE 50 MG/ML
50 INJECTION INTRAMUSCULAR; INTRAVENOUS ONCE AS NEEDED
Status: DISCONTINUED | OUTPATIENT
Start: 2018-01-02 | End: 2018-01-02 | Stop reason: HOSPADM

## 2018-01-02 RX ORDER — FAMOTIDINE 10 MG/ML
20 INJECTION INTRAVENOUS
Status: COMPLETED | OUTPATIENT
Start: 2018-01-02 | End: 2018-01-02

## 2018-01-02 RX ORDER — HEPARIN 100 UNIT/ML
500 SYRINGE INTRAVENOUS
Status: DISCONTINUED | OUTPATIENT
Start: 2018-01-02 | End: 2018-01-02 | Stop reason: HOSPADM

## 2018-01-02 RX ORDER — EPINEPHRINE 0.3 MG/.3ML
0.3 INJECTION SUBCUTANEOUS ONCE AS NEEDED
Status: DISCONTINUED | OUTPATIENT
Start: 2018-01-02 | End: 2018-01-02 | Stop reason: HOSPADM

## 2018-01-02 RX ORDER — SODIUM CHLORIDE 0.9 % (FLUSH) 0.9 %
10 SYRINGE (ML) INJECTION
Status: DISCONTINUED | OUTPATIENT
Start: 2018-01-02 | End: 2018-01-02 | Stop reason: HOSPADM

## 2018-01-02 RX ORDER — LORAZEPAM 2 MG/ML
1 INJECTION INTRAMUSCULAR
Status: DISCONTINUED | OUTPATIENT
Start: 2018-01-02 | End: 2018-01-02 | Stop reason: HOSPADM

## 2018-01-02 RX ADMIN — FAMOTIDINE 20 MG: 10 INJECTION, SOLUTION INTRAVENOUS at 01:01

## 2018-01-02 RX ADMIN — PACLITAXEL 114 MG: 6 INJECTION, SOLUTION, CONCENTRATE INTRAVENOUS at 03:01

## 2018-01-02 RX ADMIN — SODIUM CHLORIDE: 0.9 INJECTION, SOLUTION INTRAVENOUS at 01:01

## 2018-01-02 RX ADMIN — DIPHENHYDRAMINE HYDROCHLORIDE 25 MG: 50 INJECTION, SOLUTION INTRAMUSCULAR; INTRAVENOUS at 02:01

## 2018-01-02 RX ADMIN — HEPARIN 500 UNITS: 100 SYRINGE at 04:01

## 2018-01-02 RX ADMIN — SODIUM CHLORIDE, PRESERVATIVE FREE 10 ML: 5 INJECTION INTRAVENOUS at 04:01

## 2018-01-02 RX ADMIN — LORAZEPAM 1 MG: 2 INJECTION INTRAMUSCULAR; INTRAVENOUS at 01:01

## 2018-01-02 RX ADMIN — DEXAMETHASONE SODIUM PHOSPHATE 10 MG: 4 INJECTION, SOLUTION INTRA-ARTICULAR; INTRALESIONAL; INTRAMUSCULAR; INTRAVENOUS; SOFT TISSUE at 01:01

## 2018-01-02 RX ADMIN — SODIUM CHLORIDE 16 MG: 9 INJECTION, SOLUTION INTRAVENOUS at 01:01

## 2018-01-02 NOTE — PLAN OF CARE
Problem: Patient Care Overview  Goal: Plan of Care Review  Outcome: Outcome(s) achieved Date Met: 01/02/18  Pt arrived for Taxol (8/23/17) chemo infusion. PAC accessed X 1 attempt to RCW, + blood return noted, flushes easily. Pt tolerated treatment very well. PAC flushed/heparinized & de accessed w/o difficulty. Pt D/C'd home with family & instructions

## 2018-01-05 RX ORDER — DIPHENHYDRAMINE HYDROCHLORIDE 50 MG/ML
25 INJECTION INTRAMUSCULAR; INTRAVENOUS
Status: CANCELLED
Start: 2018-01-08 | End: 2018-01-08

## 2018-01-08 ENCOUNTER — INFUSION (OUTPATIENT)
Dept: INFUSION THERAPY | Facility: HOSPITAL | Age: 58
End: 2018-01-08
Attending: INTERNAL MEDICINE
Payer: COMMERCIAL

## 2018-01-08 VITALS
BODY MASS INDEX: 18.83 KG/M2 | TEMPERATURE: 98 F | OXYGEN SATURATION: 99 % | DIASTOLIC BLOOD PRESSURE: 53 MMHG | HEIGHT: 62 IN | SYSTOLIC BLOOD PRESSURE: 94 MMHG | WEIGHT: 102.31 LBS | RESPIRATION RATE: 16 BRPM | HEART RATE: 89 BPM

## 2018-01-08 DIAGNOSIS — Z17.1 MALIGNANT NEOPLASM OF CENTRAL PORTION OF LEFT BREAST IN FEMALE, ESTROGEN RECEPTOR NEGATIVE: Primary | ICD-10-CM

## 2018-01-08 DIAGNOSIS — C50.112 MALIGNANT NEOPLASM OF CENTRAL PORTION OF LEFT BREAST IN FEMALE, ESTROGEN RECEPTOR NEGATIVE: Primary | ICD-10-CM

## 2018-01-08 PROCEDURE — 96413 CHEMO IV INFUSION 1 HR: CPT | Mod: PN

## 2018-01-08 PROCEDURE — S0028 INJECTION, FAMOTIDINE, 20 MG: HCPCS | Mod: PN | Performed by: INTERNAL MEDICINE

## 2018-01-08 PROCEDURE — 25000003 PHARM REV CODE 250: Mod: PN | Performed by: INTERNAL MEDICINE

## 2018-01-08 PROCEDURE — 25000003 PHARM REV CODE 250: Mod: PN | Performed by: PHYSICIAN ASSISTANT

## 2018-01-08 PROCEDURE — 96375 TX/PRO/DX INJ NEW DRUG ADDON: CPT | Mod: PN

## 2018-01-08 PROCEDURE — 63600175 PHARM REV CODE 636 W HCPCS: Mod: PN | Performed by: INTERNAL MEDICINE

## 2018-01-08 PROCEDURE — 63600175 PHARM REV CODE 636 W HCPCS: Mod: PN | Performed by: PHYSICIAN ASSISTANT

## 2018-01-08 PROCEDURE — A4216 STERILE WATER/SALINE, 10 ML: HCPCS | Mod: PN | Performed by: PHYSICIAN ASSISTANT

## 2018-01-08 PROCEDURE — 96367 TX/PROPH/DG ADDL SEQ IV INF: CPT | Mod: PN

## 2018-01-08 RX ORDER — SODIUM CHLORIDE 0.9 % (FLUSH) 0.9 %
10 SYRINGE (ML) INJECTION
Status: DISCONTINUED | OUTPATIENT
Start: 2018-01-08 | End: 2018-01-08 | Stop reason: HOSPADM

## 2018-01-08 RX ORDER — HEPARIN 100 UNIT/ML
500 SYRINGE INTRAVENOUS
Status: DISCONTINUED | OUTPATIENT
Start: 2018-01-08 | End: 2018-01-08 | Stop reason: HOSPADM

## 2018-01-08 RX ORDER — LORAZEPAM 2 MG/ML
1 INJECTION INTRAMUSCULAR
Status: DISCONTINUED | OUTPATIENT
Start: 2018-01-08 | End: 2018-01-08 | Stop reason: HOSPADM

## 2018-01-08 RX ORDER — FAMOTIDINE 10 MG/ML
20 INJECTION INTRAVENOUS
Status: COMPLETED | OUTPATIENT
Start: 2018-01-08 | End: 2018-01-08

## 2018-01-08 RX ADMIN — FAMOTIDINE 20 MG: 10 INJECTION, SOLUTION INTRAVENOUS at 02:01

## 2018-01-08 RX ADMIN — PACLITAXEL 114 MG: 6 INJECTION, SOLUTION, CONCENTRATE INTRAVENOUS at 02:01

## 2018-01-08 RX ADMIN — LORAZEPAM 1 MG: 2 INJECTION INTRAMUSCULAR; INTRAVENOUS at 01:01

## 2018-01-08 RX ADMIN — DIPHENHYDRAMINE HYDROCHLORIDE 25 MG: 50 INJECTION, SOLUTION INTRAMUSCULAR; INTRAVENOUS at 01:01

## 2018-01-08 RX ADMIN — SODIUM CHLORIDE 16 MG: 9 INJECTION, SOLUTION INTRAVENOUS at 01:01

## 2018-01-08 RX ADMIN — DEXAMETHASONE SODIUM PHOSPHATE 10 MG: 4 INJECTION, SOLUTION INTRA-ARTICULAR; INTRALESIONAL; INTRAMUSCULAR; INTRAVENOUS; SOFT TISSUE at 01:01

## 2018-01-08 RX ADMIN — SODIUM CHLORIDE, PRESERVATIVE FREE 10 ML: 5 INJECTION INTRAVENOUS at 04:01

## 2018-01-08 RX ADMIN — HEPARIN 500 UNITS: 100 SYRINGE at 04:01

## 2018-01-08 RX ADMIN — SODIUM CHLORIDE: 9 INJECTION, SOLUTION INTRAVENOUS at 01:01

## 2018-01-09 NOTE — PLAN OF CARE
Problem: Patient Care Overview  Goal: Plan of Care Review  Outcome: Ongoing (interventions implemented as appropriate)  Tolerated Taxol infusion without any difficulty; RTC next week for final taxol infusion; Amb off unit independently with spouse

## 2018-01-15 ENCOUNTER — INFUSION (OUTPATIENT)
Dept: INFUSION THERAPY | Facility: HOSPITAL | Age: 58
End: 2018-01-15
Attending: INTERNAL MEDICINE
Payer: COMMERCIAL

## 2018-01-15 VITALS
DIASTOLIC BLOOD PRESSURE: 65 MMHG | HEIGHT: 62 IN | TEMPERATURE: 98 F | RESPIRATION RATE: 16 BRPM | BODY MASS INDEX: 19.12 KG/M2 | WEIGHT: 103.88 LBS | SYSTOLIC BLOOD PRESSURE: 103 MMHG | HEART RATE: 76 BPM

## 2018-01-15 DIAGNOSIS — Z17.1 MALIGNANT NEOPLASM OF CENTRAL PORTION OF LEFT BREAST IN FEMALE, ESTROGEN RECEPTOR NEGATIVE: Primary | ICD-10-CM

## 2018-01-15 DIAGNOSIS — C50.112 MALIGNANT NEOPLASM OF CENTRAL PORTION OF LEFT BREAST IN FEMALE, ESTROGEN RECEPTOR NEGATIVE: Primary | ICD-10-CM

## 2018-01-15 PROCEDURE — S0028 INJECTION, FAMOTIDINE, 20 MG: HCPCS | Mod: PN | Performed by: PHYSICIAN ASSISTANT

## 2018-01-15 PROCEDURE — 25000003 PHARM REV CODE 250: Mod: PN | Performed by: PHYSICIAN ASSISTANT

## 2018-01-15 PROCEDURE — 96413 CHEMO IV INFUSION 1 HR: CPT | Mod: PN

## 2018-01-15 PROCEDURE — 96367 TX/PROPH/DG ADDL SEQ IV INF: CPT | Mod: PN

## 2018-01-15 PROCEDURE — A4216 STERILE WATER/SALINE, 10 ML: HCPCS | Mod: PN | Performed by: PHYSICIAN ASSISTANT

## 2018-01-15 PROCEDURE — 63600175 PHARM REV CODE 636 W HCPCS: Mod: PN | Performed by: PHYSICIAN ASSISTANT

## 2018-01-15 PROCEDURE — 96375 TX/PRO/DX INJ NEW DRUG ADDON: CPT | Mod: PN

## 2018-01-15 RX ORDER — SODIUM CHLORIDE 0.9 % (FLUSH) 0.9 %
10 SYRINGE (ML) INJECTION
Status: DISCONTINUED | OUTPATIENT
Start: 2018-01-15 | End: 2018-01-15 | Stop reason: HOSPADM

## 2018-01-15 RX ORDER — LORAZEPAM 2 MG/ML
1 INJECTION INTRAMUSCULAR
Status: DISCONTINUED | OUTPATIENT
Start: 2018-01-15 | End: 2018-01-15 | Stop reason: HOSPADM

## 2018-01-15 RX ORDER — FAMOTIDINE 10 MG/ML
20 INJECTION INTRAVENOUS
Status: COMPLETED | OUTPATIENT
Start: 2018-01-15 | End: 2018-01-15

## 2018-01-15 RX ORDER — HEPARIN 100 UNIT/ML
500 SYRINGE INTRAVENOUS
Status: DISCONTINUED | OUTPATIENT
Start: 2018-01-15 | End: 2018-01-15 | Stop reason: HOSPADM

## 2018-01-15 RX ADMIN — SODIUM CHLORIDE 16 MG: 9 INJECTION, SOLUTION INTRAVENOUS at 02:01

## 2018-01-15 RX ADMIN — FAMOTIDINE 20 MG: 10 INJECTION, SOLUTION INTRAVENOUS at 01:01

## 2018-01-15 RX ADMIN — DIPHENHYDRAMINE HYDROCHLORIDE 25 MG: 50 INJECTION, SOLUTION INTRAMUSCULAR; INTRAVENOUS at 02:01

## 2018-01-15 RX ADMIN — LORAZEPAM 1 MG: 2 INJECTION INTRAMUSCULAR; INTRAVENOUS at 02:01

## 2018-01-15 RX ADMIN — DEXAMETHASONE SODIUM PHOSPHATE 10 MG: 4 INJECTION, SOLUTION INTRA-ARTICULAR; INTRALESIONAL; INTRAMUSCULAR; INTRAVENOUS; SOFT TISSUE at 01:01

## 2018-01-15 RX ADMIN — PACLITAXEL 114 MG: 6 INJECTION, SOLUTION, CONCENTRATE INTRAVENOUS at 02:01

## 2018-01-15 RX ADMIN — SODIUM CHLORIDE: 900 INJECTION, SOLUTION INTRAVENOUS at 01:01

## 2018-01-15 RX ADMIN — HEPARIN SODIUM (PORCINE) LOCK FLUSH IV SOLN 100 UNIT/ML 500 UNITS: 100 SOLUTION at 04:01

## 2018-01-15 RX ADMIN — Medication 10 ML: at 01:01

## 2018-01-15 NOTE — PLAN OF CARE
Problem: Patient Care Overview  Goal: Discharge Needs Assessment  Adequate for discharge.   Pt tolerated infusion without noted distress.  Reviewed upcoming appointments.  All questions answered.  Ambulated from infusion with family.

## 2018-02-19 RX ORDER — HEPARIN 100 UNIT/ML
500 SYRINGE INTRAVENOUS
Status: CANCELLED | OUTPATIENT
Start: 2018-02-19

## 2018-02-19 RX ORDER — SODIUM CHLORIDE 0.9 % (FLUSH) 0.9 %
10 SYRINGE (ML) INJECTION
Status: CANCELLED | OUTPATIENT
Start: 2018-02-19

## 2018-02-26 ENCOUNTER — INFUSION (OUTPATIENT)
Dept: INFUSION THERAPY | Facility: HOSPITAL | Age: 58
End: 2018-02-26
Attending: INTERNAL MEDICINE
Payer: COMMERCIAL

## 2018-02-26 DIAGNOSIS — Z17.1 MALIGNANT NEOPLASM OF CENTRAL PORTION OF LEFT BREAST IN FEMALE, ESTROGEN RECEPTOR NEGATIVE: Primary | ICD-10-CM

## 2018-02-26 DIAGNOSIS — C50.112 MALIGNANT NEOPLASM OF CENTRAL PORTION OF LEFT BREAST IN FEMALE, ESTROGEN RECEPTOR NEGATIVE: Primary | ICD-10-CM

## 2018-02-26 PROCEDURE — 25000003 PHARM REV CODE 250: Mod: PN | Performed by: INTERNAL MEDICINE

## 2018-02-26 PROCEDURE — A4216 STERILE WATER/SALINE, 10 ML: HCPCS | Mod: PN | Performed by: INTERNAL MEDICINE

## 2018-02-26 PROCEDURE — 96523 IRRIG DRUG DELIVERY DEVICE: CPT | Mod: PN

## 2018-02-26 PROCEDURE — 63600175 PHARM REV CODE 636 W HCPCS: Mod: PN | Performed by: INTERNAL MEDICINE

## 2018-02-26 RX ORDER — SODIUM CHLORIDE 0.9 % (FLUSH) 0.9 %
10 SYRINGE (ML) INJECTION
Status: CANCELLED | OUTPATIENT
Start: 2018-02-26

## 2018-02-26 RX ORDER — SODIUM CHLORIDE 0.9 % (FLUSH) 0.9 %
10 SYRINGE (ML) INJECTION
Status: COMPLETED | OUTPATIENT
Start: 2018-02-26 | End: 2018-02-26

## 2018-02-26 RX ORDER — HEPARIN 100 UNIT/ML
500 SYRINGE INTRAVENOUS
Status: COMPLETED | OUTPATIENT
Start: 2018-02-26 | End: 2018-02-26

## 2018-02-26 RX ORDER — HEPARIN 100 UNIT/ML
500 SYRINGE INTRAVENOUS
Status: CANCELLED | OUTPATIENT
Start: 2018-02-26

## 2018-02-26 RX ADMIN — SODIUM CHLORIDE, PRESERVATIVE FREE 10 ML: 5 INJECTION INTRAVENOUS at 01:02

## 2018-02-26 RX ADMIN — HEPARIN 500 UNITS: 100 SYRINGE at 01:02

## 2018-05-02 ENCOUNTER — DOCUMENTATION ONLY (OUTPATIENT)
Dept: PHARMACY | Facility: CLINIC | Age: 58
End: 2018-05-02

## 2018-05-03 NOTE — PROGRESS NOTES
After the patient's clinic visit with Dr. Singh I met with the patient and her  to discuss two trials Dr. Singh would like to offer the patient, [id number] A Randomized Phase III Post-Operative Trial of Platinum Based Chemotherapy Vs. Capecitabine in Patients with Residual Triple-Negative Basal-Like Breast Cancer following Neoadjuvant Chemotherapy and / A Randomized Phase III Trial to Evaluate the Efficacy and Safety of [id number] (Pembrolizumab) as Adjuvant Therapy for Triple Receptor-Negative Breast Cancer with ? 1 cm Residual Invasive Cancer or Positive Lymph Nodes (ypN+) After Neoadjuvant Chemotherapy.    We had an extensive discussion regarding the trials and doing one or the other or doing both sequentially.  We also discussed that the patient may want to discuss treatment with chemotherapy outside of the trial if that is her preference. The patient was given copies of the consents for both trials to take home and read.  The patient was interested in the  trial in particular and understands the the study includes an observation arm so she may not get treatment.  She also understands that pembrolizumab is investigational in this setting and would not be available outside of clinical trial.     The patient will contact me in the next week to let me know if she has additional questions and what she has decided.

## 2018-05-07 ENCOUNTER — LAB VISIT (OUTPATIENT)
Dept: LAB | Facility: HOSPITAL | Age: 58
End: 2018-05-07
Attending: INTERNAL MEDICINE
Payer: COMMERCIAL

## 2018-05-07 DIAGNOSIS — Z17.1 MALIGNANT NEOPLASM OF CENTRAL PORTION OF LEFT BREAST IN FEMALE, ESTROGEN RECEPTOR NEGATIVE: ICD-10-CM

## 2018-05-07 DIAGNOSIS — C50.112 MALIGNANT NEOPLASM OF CENTRAL PORTION OF LEFT BREAST IN FEMALE, ESTROGEN RECEPTOR NEGATIVE: ICD-10-CM

## 2018-05-07 PROCEDURE — 36415 COLL VENOUS BLD VENIPUNCTURE: CPT | Mod: PN

## 2018-05-07 PROCEDURE — 81400 MOPATH PROCEDURE LEVEL 1: CPT

## 2018-05-14 ENCOUNTER — INFUSION (OUTPATIENT)
Dept: INFUSION THERAPY | Facility: HOSPITAL | Age: 58
End: 2018-05-14
Attending: INTERNAL MEDICINE
Payer: COMMERCIAL

## 2018-05-14 DIAGNOSIS — Z17.1 MALIGNANT NEOPLASM OF CENTRAL PORTION OF LEFT BREAST IN FEMALE, ESTROGEN RECEPTOR NEGATIVE: Primary | ICD-10-CM

## 2018-05-14 DIAGNOSIS — C50.112 MALIGNANT NEOPLASM OF CENTRAL PORTION OF LEFT BREAST IN FEMALE, ESTROGEN RECEPTOR NEGATIVE: Primary | ICD-10-CM

## 2018-05-14 PROCEDURE — A4216 STERILE WATER/SALINE, 10 ML: HCPCS | Mod: PN | Performed by: INTERNAL MEDICINE

## 2018-05-14 PROCEDURE — 96523 IRRIG DRUG DELIVERY DEVICE: CPT | Mod: PN

## 2018-05-14 PROCEDURE — 25000003 PHARM REV CODE 250: Mod: PN | Performed by: INTERNAL MEDICINE

## 2018-05-14 PROCEDURE — 63600175 PHARM REV CODE 636 W HCPCS: Mod: PN | Performed by: INTERNAL MEDICINE

## 2018-05-14 RX ORDER — SODIUM CHLORIDE 0.9 % (FLUSH) 0.9 %
10 SYRINGE (ML) INJECTION
Status: COMPLETED | OUTPATIENT
Start: 2018-05-14 | End: 2018-05-14

## 2018-05-14 RX ORDER — HEPARIN 100 UNIT/ML
500 SYRINGE INTRAVENOUS
Status: CANCELLED | OUTPATIENT
Start: 2018-05-14

## 2018-05-14 RX ORDER — HEPARIN 100 UNIT/ML
500 SYRINGE INTRAVENOUS
Status: COMPLETED | OUTPATIENT
Start: 2018-05-14 | End: 2018-05-14

## 2018-05-14 RX ORDER — SODIUM CHLORIDE 0.9 % (FLUSH) 0.9 %
10 SYRINGE (ML) INJECTION
Status: CANCELLED | OUTPATIENT
Start: 2018-05-14

## 2018-05-14 RX ADMIN — SODIUM CHLORIDE, PRESERVATIVE FREE 10 ML: 5 INJECTION INTRAVENOUS at 11:05

## 2018-05-14 RX ADMIN — HEPARIN 500 UNITS: 100 SYRINGE at 11:05

## 2018-05-15 LAB
ANNOTATION COMMENT IMP: NORMAL
DPYD DISCLAIMER: NORMAL
DPYD INTERPRETATION: NORMAL
DPYD METHOD: NORMAL
DPYD RESULT DETAILS: NORMAL
DYPD TOXICITY RISK: NORMAL
REVIEWED BY: NORMAL

## 2018-06-15 ENCOUNTER — TELEPHONE (OUTPATIENT)
Dept: INFUSION THERAPY | Facility: HOSPITAL | Age: 58
End: 2018-06-15

## 2018-06-15 NOTE — TELEPHONE ENCOUNTER
----- Message from Millie Coleman sent at 6/15/2018  2:19 PM CDT -----  Type:  Sooner Apoointment Request    Caller is requesting a sooner appointment.  Caller declined first available appointment listed below.  Caller will not accept being placed on the waitlist and is requesting a message be sent to doctor.    Name of Caller:  Self When is the first available appointment?   Symptoms:    Best Call Back Number:  [phone] Additional Information:  Patient called asking to reschedule appointment for 06/29/2018. Patient ask for appointment to be on the same day of the doctor's appointment.

## 2018-07-02 ENCOUNTER — INFUSION (OUTPATIENT)
Dept: INFUSION THERAPY | Facility: HOSPITAL | Age: 58
End: 2018-07-02
Attending: INTERNAL MEDICINE
Payer: COMMERCIAL

## 2018-07-02 DIAGNOSIS — C50.112 MALIGNANT NEOPLASM OF CENTRAL PORTION OF LEFT BREAST IN FEMALE, ESTROGEN RECEPTOR NEGATIVE: Primary | ICD-10-CM

## 2018-07-02 DIAGNOSIS — Z17.1 MALIGNANT NEOPLASM OF CENTRAL PORTION OF LEFT BREAST IN FEMALE, ESTROGEN RECEPTOR NEGATIVE: Primary | ICD-10-CM

## 2018-07-02 PROCEDURE — 63600175 PHARM REV CODE 636 W HCPCS: Mod: PN | Performed by: INTERNAL MEDICINE

## 2018-07-02 PROCEDURE — A4216 STERILE WATER/SALINE, 10 ML: HCPCS | Mod: PN | Performed by: INTERNAL MEDICINE

## 2018-07-02 PROCEDURE — 25000003 PHARM REV CODE 250: Mod: PN | Performed by: INTERNAL MEDICINE

## 2018-07-02 PROCEDURE — 96523 IRRIG DRUG DELIVERY DEVICE: CPT | Mod: PN

## 2018-07-02 RX ORDER — HEPARIN 100 UNIT/ML
500 SYRINGE INTRAVENOUS
Status: CANCELLED | OUTPATIENT
Start: 2018-07-02

## 2018-07-02 RX ORDER — SODIUM CHLORIDE 0.9 % (FLUSH) 0.9 %
10 SYRINGE (ML) INJECTION
Status: CANCELLED | OUTPATIENT
Start: 2018-07-02

## 2018-07-02 RX ORDER — HEPARIN 100 UNIT/ML
500 SYRINGE INTRAVENOUS
Status: COMPLETED | OUTPATIENT
Start: 2018-07-02 | End: 2018-07-02

## 2018-07-02 RX ORDER — SODIUM CHLORIDE 0.9 % (FLUSH) 0.9 %
10 SYRINGE (ML) INJECTION
Status: COMPLETED | OUTPATIENT
Start: 2018-07-02 | End: 2018-07-02

## 2018-07-02 RX ADMIN — SODIUM CHLORIDE, PRESERVATIVE FREE 10 ML: 5 INJECTION INTRAVENOUS at 11:07

## 2018-07-02 RX ADMIN — HEPARIN 500 UNITS: 100 SYRINGE at 11:07

## 2018-07-02 NOTE — PLAN OF CARE
Problem: Patient Care Overview  Goal: Plan of Care Review  Outcome: Ongoing (interventions implemented as appropriate)  Pt had port flushed, positive for blood return. No complaints, avs given.Pt educated to call Dr with any issues. Pt verbalized understanding

## 2018-08-09 ENCOUNTER — TELEPHONE (OUTPATIENT)
Dept: INFUSION THERAPY | Facility: HOSPITAL | Age: 58
End: 2018-08-09

## 2018-08-09 NOTE — TELEPHONE ENCOUNTER
----- Message from Marjorie Blackwell sent at 8/9/2018  9:03 AM CDT -----  Caller: Patient                   Callback number: [phone]                       Reason: Patient needs to move her port flush to same day as office visit. Please advise                 Thank you

## 2018-08-28 ENCOUNTER — INFUSION (OUTPATIENT)
Dept: INFUSION THERAPY | Facility: HOSPITAL | Age: 58
End: 2018-08-28
Attending: INTERNAL MEDICINE
Payer: COMMERCIAL

## 2018-08-28 DIAGNOSIS — C50.112 MALIGNANT NEOPLASM OF CENTRAL PORTION OF LEFT BREAST IN FEMALE, ESTROGEN RECEPTOR NEGATIVE: Primary | ICD-10-CM

## 2018-08-28 DIAGNOSIS — Z17.1 MALIGNANT NEOPLASM OF CENTRAL PORTION OF LEFT BREAST IN FEMALE, ESTROGEN RECEPTOR NEGATIVE: Primary | ICD-10-CM

## 2018-08-28 PROCEDURE — 25000003 PHARM REV CODE 250: Mod: PN | Performed by: INTERNAL MEDICINE

## 2018-08-28 PROCEDURE — 96523 IRRIG DRUG DELIVERY DEVICE: CPT | Mod: PN

## 2018-08-28 PROCEDURE — 63600175 PHARM REV CODE 636 W HCPCS: Mod: PN | Performed by: INTERNAL MEDICINE

## 2018-08-28 PROCEDURE — A4216 STERILE WATER/SALINE, 10 ML: HCPCS | Mod: PN | Performed by: INTERNAL MEDICINE

## 2018-08-28 RX ORDER — HEPARIN 100 UNIT/ML
500 SYRINGE INTRAVENOUS
Status: CANCELLED | OUTPATIENT
Start: 2018-08-28

## 2018-08-28 RX ORDER — SODIUM CHLORIDE 0.9 % (FLUSH) 0.9 %
10 SYRINGE (ML) INJECTION
Status: COMPLETED | OUTPATIENT
Start: 2018-08-28 | End: 2018-08-28

## 2018-08-28 RX ORDER — SODIUM CHLORIDE 0.9 % (FLUSH) 0.9 %
10 SYRINGE (ML) INJECTION
Status: CANCELLED | OUTPATIENT
Start: 2018-08-28

## 2018-08-28 RX ORDER — HEPARIN 100 UNIT/ML
500 SYRINGE INTRAVENOUS
Status: COMPLETED | OUTPATIENT
Start: 2018-08-28 | End: 2018-08-28

## 2018-08-28 RX ADMIN — Medication 10 ML: at 04:08

## 2018-08-28 RX ADMIN — HEPARIN 500 UNITS: 100 SYRINGE at 04:08

## 2018-08-28 NOTE — PLAN OF CARE
Problem: Patient Care Overview  Goal: Plan of Care Review  Outcome: Ongoing (interventions implemented as appropriate)  Port flushed without difficulty. Blood return noted avs not given pt has my chart

## 2018-10-08 PROBLEM — I26.99 ACUTE PULMONARY EMBOLISM: Status: ACTIVE | Noted: 2018-10-08

## 2018-10-18 PROBLEM — Z98.890 S/P BREAST RECONSTRUCTION: Status: ACTIVE | Noted: 2018-10-18

## 2018-11-05 PROBLEM — C50.212: Status: ACTIVE | Noted: 2018-11-05

## 2019-10-01 RX ORDER — TEMAZEPAM 15 MG/1
15 CAPSULE ORAL NIGHTLY PRN
Qty: 30 CAPSULE | Refills: 5 | Status: SHIPPED | OUTPATIENT
Start: 2019-10-01 | End: 2019-11-11 | Stop reason: SDUPTHER

## 2019-10-01 NOTE — TELEPHONE ENCOUNTER
----- Message from Mackenzie Ramirez sent at 10/1/2019 10:20 AM CDT -----  Contact: [facility]   Refill Temazepam [facility] on [address] # [phone] [facility]

## 2019-11-11 RX ORDER — TEMAZEPAM 15 MG/1
15 CAPSULE ORAL NIGHTLY PRN
Qty: 30 CAPSULE | Refills: 0 | Status: SHIPPED | OUTPATIENT
Start: 2019-11-11 | End: 2019-11-26 | Stop reason: SDUPTHER

## 2019-11-11 NOTE — TELEPHONE ENCOUNTER
Pt has been rescheduled on 11/26 @ 3:40p and also needs refill of Temazepam (per insurance, can only be filled for 21 days)

## 2019-11-11 NOTE — TELEPHONE ENCOUNTER
----- Message from Ca Hardy MA sent at 11/11/2019 10:43 AM CST -----  Contact: Hollie      ----- Message -----  From: Mackenzie Ramirez  Sent: 11/11/2019   9:57 AM CST  To: [facility]mie Rojas Staff    The patient had to cancel her apt for tomorrow  she has meetings. Please reschedule her late in the afternoon it has to be after 3 pm with Tommie. Refill on temazepam. [facility] on [address] # [phone] [facility]

## 2019-11-26 ENCOUNTER — OFFICE VISIT (OUTPATIENT)
Dept: FAMILY MEDICINE | Facility: CLINIC | Age: 59
End: 2019-11-26
Payer: COMMERCIAL

## 2019-11-26 VITALS
SYSTOLIC BLOOD PRESSURE: 106 MMHG | HEART RATE: 68 BPM | HEIGHT: 62 IN | WEIGHT: 103.63 LBS | BODY MASS INDEX: 19.07 KG/M2 | DIASTOLIC BLOOD PRESSURE: 72 MMHG

## 2019-11-26 DIAGNOSIS — F51.01 PRIMARY INSOMNIA: ICD-10-CM

## 2019-11-26 DIAGNOSIS — C50.112 MALIGNANT NEOPLASM OF CENTRAL PORTION OF LEFT BREAST IN FEMALE, ESTROGEN RECEPTOR NEGATIVE: Primary | ICD-10-CM

## 2019-11-26 DIAGNOSIS — Z00.00 ROUTINE PHYSICAL EXAMINATION: ICD-10-CM

## 2019-11-26 DIAGNOSIS — Z17.1 MALIGNANT NEOPLASM OF CENTRAL PORTION OF LEFT BREAST IN FEMALE, ESTROGEN RECEPTOR NEGATIVE: Primary | ICD-10-CM

## 2019-11-26 DIAGNOSIS — F41.9 ANXIETY: ICD-10-CM

## 2019-11-26 PROCEDURE — 99396 PREV VISIT EST AGE 40-64: CPT | Mod: S$GLB,,, | Performed by: PHYSICIAN ASSISTANT

## 2019-11-26 PROCEDURE — 99396 PR PREVENTIVE VISIT,EST,40-64: ICD-10-PCS | Mod: S$GLB,,, | Performed by: PHYSICIAN ASSISTANT

## 2019-11-26 RX ORDER — ACYCLOVIR 400 MG/1
400 TABLET ORAL 2 TIMES DAILY
Qty: 180 TABLET | Refills: 1 | Status: SHIPPED | OUTPATIENT
Start: 2019-11-26 | End: 2020-10-19 | Stop reason: SDUPTHER

## 2019-11-26 RX ORDER — TEMAZEPAM 15 MG/1
15 CAPSULE ORAL NIGHTLY PRN
Qty: 90 CAPSULE | Refills: 1 | Status: SHIPPED | OUTPATIENT
Start: 2019-11-26 | End: 2020-04-07 | Stop reason: SDUPTHER

## 2019-11-26 RX ORDER — CITALOPRAM 10 MG/1
10 TABLET ORAL DAILY
Qty: 90 TABLET | Refills: 1 | Status: SHIPPED | OUTPATIENT
Start: 2019-11-26 | End: 2020-05-26 | Stop reason: SDUPTHER

## 2019-11-26 RX ORDER — BIOTIN 1 MG
1000 TABLET ORAL 3 TIMES DAILY
COMMUNITY
End: 2023-08-10

## 2019-11-26 RX ORDER — ALPRAZOLAM 0.5 MG/1
0.5 TABLET ORAL 2 TIMES DAILY PRN
Qty: 90 TABLET | Refills: 1 | Status: SHIPPED | OUTPATIENT
Start: 2019-11-26 | End: 2020-04-07 | Stop reason: SDUPTHER

## 2019-11-26 NOTE — PROGRESS NOTES
SUBJECTIVE:    Patient ID: Hollie Tsang is a 59 y.o. female.    Chief Complaint: Follow-up (Pt presents for 6 month follow up.....Pt reports PVC's that are more often than usual, pt reports she has a cardiologist for this condition....No other concerns at this time.....mlr) and Medication Refill    59-year-old white female presents today for regular 6 month checkup.  She appears to be doing very well.  She still follows up with her oncologist Dr. Singh.  She is well managed with regard to her anxiety and depression.  She uses temazepam p.r.n. for insomnia.  Remains on metanX for her MTHFR mutation and history of PE. She has been treated for her PVCs with Dr. Bardales and seems to be better managed. Holiday, family, work stress really building up on her. She is still seeing Dr. Ramirez for her low back pain. Sounds like he is trying to get her set up for rhizotomy.      No visits with results within 6 Month(s) from this visit.   Latest known visit with results is:   Admission on 11/05/2018, Discharged on 11/06/2018   Component Date Value Ref Range Status    WBC 11/05/2018 6.81  3.90 - 12.70 K/uL Final    RBC 11/05/2018 3.87* 4.00 - 5.40 M/uL Final    Hemoglobin 11/05/2018 11.9* 12.0 - 16.0 g/dL Final    Hematocrit 11/05/2018 37.2  37.0 - 48.5 % Final    Mean Corpuscular Volume 11/05/2018 96  82 - 98 fL Final    Mean Corpuscular Hemoglobin 11/05/2018 30.7  27.0 - 31.0 pg Final    Mean Corpuscular Hemoglobin Conc 11/05/2018 32.0  32.0 - 36.0 g/dL Final    RDW 11/05/2018 13.0  11.5 - 14.5 % Final    Platelets 11/05/2018 212  150 - 350 K/uL Final    MPV 11/05/2018 8.9* 9.2 - 12.9 fL Final    Gran # (ANC) 11/05/2018 6.1  1.8 - 7.7 K/uL Final    Lymph # 11/05/2018 0.5* 1.0 - 4.8 K/uL Final    Mono # 11/05/2018 0.2* 0.3 - 1.0 K/uL Final    Eos # 11/05/2018 0.0  0.0 - 0.5 K/uL Final    Baso # 11/05/2018 0.01  0.00 - 0.20 K/uL Final    nRBC 11/05/2018 0  0 /100 WBC Final    Gran% 11/05/2018 90.0* 38.0 - 73.0  % Final    Lymph% 11/05/2018 7.6* 18.0 - 48.0 % Final    Mono% 11/05/2018 2.3* 4.0 - 15.0 % Final    Eosinophil% 11/05/2018 0.0  0.0 - 8.0 % Final    Basophil% 11/05/2018 0.1  0.0 - 1.9 % Final    Differential Method 11/05/2018 Automated   Final       Past Medical History:   Diagnosis Date    Anxiety     Breast cancer     Cancer     breast    Depression     Encounter for blood transfusion     History of gastroesophageal reflux (GERD)     Hx pulmonary embolism     Meningitis     Osteoporosis     PONV (postoperative nausea and vomiting)     PVC's (premature ventricular contractions)      Past Surgical History:   Procedure Laterality Date    AUGMENTATION OF BREAST      BREAST BIOPSY      BREAST RECONSTRUCTION      CHOLECYSTECTOMY      COLONOSCOPY  2009    HYSTERECTOMY      INSERTION OF BREAST IMPLANT Bilateral 11/5/2018    Procedure: INSERTION, BREAST IMPLANT;  Surgeon: Nedra Burden MD;  Location: [facility];  Service: Plastics;  Laterality: Bilateral;    MASTECTOMY Bilateral     reconstruction with tissue expanders    TISSUE EXPANDER REMOVAL Bilateral 11/5/2018    Procedure: REMOVAL, TISSUE EXPANDER;  Surgeon: Nedra Burden MD;  Location: [facility];  Service: Plastics;  Laterality: Bilateral;    TUBAL LIGATION       Family History   Problem Relation Age of Onset    Cancer Mother         breast    Heart disease Mother     Diabetes Mother     Heart disease Father     Stroke Father     Heart failure Maternal Aunt     Heart failure Maternal Uncle        Marital Status:   Alcohol History:  reports that she does not drink alcohol.  Tobacco History:  reports that she has never smoked. She has never used smokeless tobacco.  Drug History:  reports that she does not use drugs.    Review of patient's allergies indicates:   Allergen Reactions    Adhesive Rash    Fosamax [alendronate]      Bone erosion in jaw       Current Outpatient Medications:     acyclovir (ZOVIRAX) 400 MG tablet,  "Take 1 tablet (400 mg total) by mouth 2 (two) times daily., Disp: 180 tablet, Rfl: 1    ALPRAZolam (XANAX) 0.5 MG tablet, Take 1 tablet (0.5 mg total) by mouth 2 (two) times daily as needed for Anxiety., Disp: 90 tablet, Rfl: 1    biotin 1 mg tablet, Take 1,000 mcg by mouth 3 (three) times daily., Disp: , Rfl:     citalopram (CELEXA) 10 MG tablet, Take 1 tablet (10 mg total) by mouth once daily., Disp: 90 tablet, Rfl: 1    ohacixayg-A7-clU28-algal oil (METANX/FOLTANX RF) 3 mg-35 mg-2 mg -90.314 mg Cap, Take 1 tablet by mouth once daily., Disp: 30 capsule, Rfl: 6    multivitamin capsule, Take 1 capsule by mouth once daily., Disp: , Rfl:     temazepam (RESTORIL) 15 mg Cap, Take 1 capsule (15 mg total) by mouth nightly as needed., Disp: 90 capsule, Rfl: 1    VITAMIN B COMPLEX (B COMPLEX-VITAMIN B12 ORAL), Take 1 tablet by mouth once daily. , Disp: , Rfl:   No current facility-administered medications for this visit.     Facility-Administered Medications Ordered in Other Visits:     lactated ringers infusion, , Intravenous, Continuous, Charles Cheung MD    lidocaine (PF) 10 mg/ml (1%) injection 2 mg, 0.2 mL, Intradermal, Once, Charles Cheung MD    Review of Systems   Constitutional: Negative for appetite change, chills, fatigue, fever and unexpected weight change.   HENT: Negative for congestion.    Respiratory: Negative for cough, chest tightness and shortness of breath.    Cardiovascular: Negative for chest pain and palpitations.   Gastrointestinal: Negative for abdominal distention and abdominal pain.   Endocrine: Negative for cold intolerance and heat intolerance.   Genitourinary: Negative for difficulty urinating and dysuria.   Musculoskeletal: Negative for arthralgias and back pain.   Neurological: Negative for dizziness, weakness and headaches.          Objective:      Vitals:    11/26/19 1537   BP: 106/72   Pulse: 68   Weight: 47 kg (103 lb 9.6 oz)   Height: 5' 2" (1.575 m)     Physical Exam "   Constitutional: She is oriented to person, place, and time. She appears well-developed and well-nourished. No distress.   HENT:   Head: Normocephalic and atraumatic.   Eyes: Pupils are equal, round, and reactive to light. Conjunctivae and EOM are normal.   Neck: Normal range of motion. Neck supple. No thyromegaly present.   Cardiovascular: Normal rate, regular rhythm, normal heart sounds and intact distal pulses.   Pulmonary/Chest: Effort normal and breath sounds normal.   Abdominal: Soft. Bowel sounds are normal. She exhibits no distension. There is no tenderness.   Musculoskeletal: Normal range of motion.   Neurological: She is alert and oriented to person, place, and time. No cranial nerve deficit.   Skin: Skin is warm and dry. No erythema.   Psychiatric: She has a normal mood and affect.         Assessment:       1. Malignant neoplasm of central portion of left breast in female, estrogen receptor negative    2. Anxiety    3. Primary insomnia    4. Routine physical examination         Plan:       Malignant neoplasm of central portion of left breast in female, estrogen receptor negative  Comments:  She is still following up closely with oncology. Will get labs for me and them ASAP.    Anxiety  Comments:  mood and anxiety stable at this time.  Orders:  -     ALPRAZolam (XANAX) 0.5 MG tablet; Take 1 tablet (0.5 mg total) by mouth 2 (two) times daily as needed for Anxiety.  Dispense: 90 tablet; Refill: 1  -     citalopram (CELEXA) 10 MG tablet; Take 1 tablet (10 mg total) by mouth once daily.  Dispense: 90 tablet; Refill: 1    Primary insomnia  Comments:  refills as needed.  Orders:  -     temazepam (RESTORIL) 15 mg Cap; Take 1 capsule (15 mg total) by mouth nightly as needed.  Dispense: 90 capsule; Refill: 1    Routine physical examination  -     CBC auto differential; Future; Expected date: 11/26/2019  -     Comprehensive metabolic panel; Future; Expected date: 11/26/2019  -     Lipid panel; Future; Expected  date: 11/26/2019  -     TSH w/reflex to FT4; Future; Expected date: 11/26/2019  -     Urinalysis, Reflex to Urine Culture Urine, Clean Catch; Future; Expected date: 11/26/2019    Other orders  -     acyclovir (ZOVIRAX) 400 MG tablet; Take 1 tablet (400 mg total) by mouth 2 (two) times daily.  Dispense: 180 tablet; Refill: 1      Follow up in about 6 months (around 5/26/2020) for Annual Physical.        11/26/2019 Tommie Rojas PA-C

## 2019-11-26 NOTE — PATIENT INSTRUCTIONS
Prevention Guidelines, Women Ages 50 to 64  Screening tests and vaccines are an important part of managing your health. Health counseling is essential, too. Below are guidelines for these, for women ages 50 to 64. Talk with your healthcare provider to make sure youre up to date on what you need.  Screening Who needs it How often   Type 2 diabetes or prediabetes All adults beginning at age 45 and adults without symptoms at any age who are overweight or obese and have 1 or more additional risk factors for diabetes. At  least every 3 years   Alcohol misuse All women in this age group At routine exams   Blood pressure All women in this age group Every 2 years if your blood pressure is less than 120/80 mm Hg; yearly if your systolic blood pressure is 120 to 139 mm Hg, or your diastolic blood pressure reading is 80 to 89 mm Hg   Breast cancer All women in this age group Yearly mammogram and clinical breast exam1   Cervical cancer All women in this age group, except women who have had a complete hysterectomy Pap test every 3 years or Pap test with human papillomavirus (HPV) test every 5 years   Chlamydia Women at increased risk for infection At routine exams   Colorectal cancer All women in this age group Flexible sigmoidoscopy every 5 years, or colonoscopy every 10 years, or double-contrast barium enema every 5 years; yearly fecal occult blood test or fecal immunochemical test; or a stool DNA test as often as your health care provider advises; talk with your health care provider about which tests are best for you   Depression All women in this age group At routine exams   Gonorrhea Sexually active women at increased risk for infection At routine exams   Hepatitis C Anyone at increased risk; 1 time for those born between 1945 and 1965 At routine exams   High cholesterol or triglycerides All women in this age group who are at risk for coronary artery disease At least every 5 years   HIV All women At routine exams   Lung  cancer Adults age 55 to 80 who have smoked Yearly screening in smokers with 30 pack-year history of smoking or who quit within 15 years   Obesity All women in this age group At routine exams   Osteoporosis Women who are postmenopausal Ask your healthcare provider   Syphilis Women at increased risk for infection - talk with your healthcare provider At routine exams   Tuberculosis Women at increased risk for infection - talk with your healthcare provider Ask your healthcare provider   Vision All women in this age group Ask your healthcare provider   Vaccine Who needs it How often   Chickenpox (varicella) All women in this age group who have no record of this infection or vaccine 2 doses; the second dose should be given at least 4 weeks after the first dose   Hepatitis A Women at increased risk for infection - talk with your healthcare provider 2 doses given at least 6 months apart   Hepatitis B Women at increased risk for infection - talk with your healthcare provider 3 doses over 6 months; second dose should be given 1 month after the first dose; the third dose should be given at least 2 months after the second dose and at least 4 months after the first dose   Haemophilus influenzaeType B (HIB) Women at increased risk for infection - talk with your healthcare provider 1 to 3 doses   Influenza (flu) All women in this age group Once a year   Measles, mumps, rubella (MMR) Women in this age group through their late 50s who have no record of these infections or vaccines 1 dose   Meningococcal Women at increased risk for infection - talk with your healthcare provider 1 or more doses   Pneumococcal conjugate vaccine (PCV13) and pneumococcal polysaccharide vaccine (PPSV23) Women at increased risk for infection - talk with your healthcare provider PCV13: 1 dose ages 19 to 65 (protects against 13 types of pneumococcal bacteria)  PPSV23: 1 to 2 doses through age 64, or 1 dose at 65 or older (protects against 23 types of  pneumococcal bacteria)   Tetanus/diphtheria/pertussis (Td/Tdap) booster All women in this age group Td every 10 years, or a one-time dose of Tdap instead of a Td booster after age 18, then Td every 10 years   Zoster All women ages 60 and older 1 dose   Counseling Who needs it How often   BRCA gene mutation testing for breast and ovarian cancer susceptibility Women with increased risk for having gene mutation When your risk is known   Breast cancer and chemoprevention Women at high risk for breast cancer When your risk is known   Diet and exercise Women who are overweight or obese When diagnosed, and then at routine exams   Sexually transmitted infection prevention Women at increased risk for infection - talk with your healthcare provider At routine exams   Use of daily aspirin Women ages 55 and up in this age group who are at risk for cardiovascular health problems such as stroke When your risk is known   Use of tobacco and the health effects it can cause All women in this age group Every exam   1American [employer]  Date Last Reviewed: 1/26/2016  © 6660-5017 [employer]. [address] [place] [address], [place] [address]. All rights reserved. This information is not intended as a substitute for professional medical care. Always follow your healthcare professional's instructions.

## 2019-12-14 LAB
ALBUMIN SERPL-MCNC: 4.5 G/DL (ref 3.6–5.1)
ALBUMIN/GLOB SERPL: 2.1 (CALC) (ref 1–2.5)
ALP SERPL-CCNC: 57 U/L (ref 33–130)
ALT SERPL-CCNC: 22 U/L (ref 6–29)
AST SERPL-CCNC: 21 U/L (ref 10–35)
BASOPHILS # BLD AUTO: 51 CELLS/UL (ref 0–200)
BASOPHILS NFR BLD AUTO: 1.5 %
BILIRUB SERPL-MCNC: 0.6 MG/DL (ref 0.2–1.2)
BUN SERPL-MCNC: 14 MG/DL (ref 7–25)
BUN/CREAT SERPL: NORMAL (CALC) (ref 6–22)
CALCIUM SERPL-MCNC: 9.4 MG/DL (ref 8.6–10.4)
CHLORIDE SERPL-SCNC: 104 MMOL/L (ref 98–110)
CHOLEST SERPL-MCNC: 171 MG/DL
CHOLEST/HDLC SERPL: 3.1 (CALC)
CO2 SERPL-SCNC: 31 MMOL/L (ref 20–32)
CREAT SERPL-MCNC: 0.64 MG/DL (ref 0.5–0.99)
EOSINOPHIL # BLD AUTO: 78 CELLS/UL (ref 15–500)
EOSINOPHIL NFR BLD AUTO: 2.3 %
ERYTHROCYTE [DISTWIDTH] IN BLOOD BY AUTOMATED COUNT: 12.6 % (ref 11–15)
GFRSERPLBLD MDRD-ARVRAT: 97 ML/MIN/1.73M2
GLOBULIN SER CALC-MCNC: 2.1 G/DL (CALC) (ref 1.9–3.7)
GLUCOSE SERPL-MCNC: 84 MG/DL (ref 65–99)
HCT VFR BLD AUTO: 39 % (ref 35–45)
HDLC SERPL-MCNC: 55 MG/DL
HGB BLD-MCNC: 12.7 G/DL (ref 11.7–15.5)
LDLC SERPL CALC-MCNC: 100 MG/DL (CALC)
LYMPHOCYTES # BLD AUTO: 925 CELLS/UL (ref 850–3900)
LYMPHOCYTES NFR BLD AUTO: 27.2 %
MCH RBC QN AUTO: 30.5 PG (ref 27–33)
MCHC RBC AUTO-ENTMCNC: 32.6 G/DL (ref 32–36)
MCV RBC AUTO: 93.8 FL (ref 80–100)
MONOCYTES # BLD AUTO: 326 CELLS/UL (ref 200–950)
MONOCYTES NFR BLD AUTO: 9.6 %
NEUTROPHILS # BLD AUTO: 2020 CELLS/UL (ref 1500–7800)
NEUTROPHILS NFR BLD AUTO: 59.4 %
NONHDLC SERPL-MCNC: 116 MG/DL (CALC)
PLATELET # BLD AUTO: 243 THOUSAND/UL (ref 140–400)
PMV BLD REES-ECKER: 9.5 FL (ref 7.5–12.5)
POTASSIUM SERPL-SCNC: 4.1 MMOL/L (ref 3.5–5.3)
PROT SERPL-MCNC: 6.6 G/DL (ref 6.1–8.1)
RBC # BLD AUTO: 4.16 MILLION/UL (ref 3.8–5.1)
SODIUM SERPL-SCNC: 141 MMOL/L (ref 135–146)
TRIGL SERPL-MCNC: 71 MG/DL
TSH SERPL-ACNC: 0.54 MIU/L (ref 0.4–4.5)
WBC # BLD AUTO: 3.4 THOUSAND/UL (ref 3.8–10.8)

## 2020-02-06 DIAGNOSIS — I26.99 IATROGENIC PULMONARY EMBOLISM AND INFARCTION: Primary | ICD-10-CM

## 2020-02-06 DIAGNOSIS — R06.89 HYPOVENTILATION, IDIOPATHIC: ICD-10-CM

## 2020-02-06 DIAGNOSIS — T81.718A IATROGENIC PULMONARY EMBOLISM AND INFARCTION: Primary | ICD-10-CM

## 2020-02-07 ENCOUNTER — CLINICAL SUPPORT (OUTPATIENT)
Dept: CARDIOLOGY | Facility: HOSPITAL | Age: 60
End: 2020-02-07
Attending: INTERNAL MEDICINE
Payer: COMMERCIAL

## 2020-02-07 VITALS — BODY MASS INDEX: 19.07 KG/M2 | WEIGHT: 103.63 LBS | HEIGHT: 62 IN

## 2020-02-07 DIAGNOSIS — T81.718A IATROGENIC PULMONARY EMBOLISM AND INFARCTION: ICD-10-CM

## 2020-02-07 DIAGNOSIS — I26.99 IATROGENIC PULMONARY EMBOLISM AND INFARCTION: ICD-10-CM

## 2020-02-07 DIAGNOSIS — R06.89 HYPOVENTILATION, IDIOPATHIC: ICD-10-CM

## 2020-02-07 LAB
AORTIC ROOT ANNULUS: 2.92 CM
AORTIC VALVE CUSP SEPERATION: 1.93 CM
AV INDEX (PROSTH): 0.85
AV MEAN GRADIENT: 2 MMHG
AV PEAK GRADIENT: 4 MMHG
AV VALVE AREA: 2.94 CM2
AV VELOCITY RATIO: 86.96
BSA FOR ECHO PROCEDURE: 1.43 M2
CV ECHO LV RWT: 0.36 CM
CV STRESS BASE HR: 72 BPM
DIASTOLIC BLOOD PRESSURE: 75 MMHG
DOP CALC AO PEAK VEL: 1.04 M/S
DOP CALC AO VTI: 20.81 CM
DOP CALC LVOT AREA: 3.5 CM2
DOP CALC LVOT DIAMETER: 2.1 CM
DOP CALC LVOT PEAK VEL: 90.44 M/S
DOP CALC LVOT STROKE VOLUME: 61.14 CM3
DOP CALCLVOT PEAK VEL VTI: 17.66 CM
E WAVE DECELERATION TIME: 281.78 MSEC
E/A RATIO: 0.76
E/E' RATIO: 4.95 M/S
ECHO LV POSTERIOR WALL: 0.81 CM (ref 0.6–1.1)
FRACTIONAL SHORTENING: 29 % (ref 28–44)
INTERVENTRICULAR SEPTUM: 0.81 CM (ref 0.6–1.1)
LEFT ATRIUM SIZE: 1.99 CM
LEFT INTERNAL DIMENSION IN SYSTOLE: 3.17 CM (ref 2.1–4)
LEFT VENTRICLE MASS INDEX: 78 G/M2
LEFT VENTRICULAR INTERNAL DIMENSION IN DIASTOLE: 4.45 CM (ref 3.5–6)
LEFT VENTRICULAR MASS: 113.36 G
LV LATERAL E/E' RATIO: 4.33 M/S
LV SEPTAL E/E' RATIO: 5.78 M/S
MV PEAK A VEL: 0.68 M/S
MV PEAK E VEL: 0.52 M/S
OHS CV CPX 1 MINUTE RECOVERY HEART RATE: 104 BPM
OHS CV CPX 85 PERCENT MAX PREDICTED HEART RATE MALE: 129
OHS CV CPX ESTIMATED METS: 10.9
OHS CV CPX MAX PREDICTED HEART RATE: 152
OHS CV CPX PATIENT IS FEMALE: 1
OHS CV CPX PATIENT IS MALE: 0
OHS CV CPX PEAK DIASTOLIC BLOOD PRESSURE: 78 MMHG
OHS CV CPX PEAK HEAR RATE: 138 BPM
OHS CV CPX PEAK RATE PRESSURE PRODUCT: NORMAL
OHS CV CPX PEAK SYSTOLIC BLOOD PRESSURE: 140 MMHG
OHS CV CPX PERCENT MAX PREDICTED HEART RATE ACHIEVED: 91
OHS CV CPX RATE PRESSURE PRODUCT PRESENTING: 7992
PISA TR MAX VEL: 2.25 M/S
PV PEAK VELOCITY: 79.85 CM/S
STRESS ECHO POST EXERCISE DUR MIN: 9 MINUTES
STRESS ECHO POST EXERCISE DUR SEC: 30 SECONDS
STRESS ECHO TARGET HR: 135 BPM
SYSTOLIC BLOOD PRESSURE: 111 MMHG
TDI LATERAL: 0.12 M/S
TDI SEPTAL: 0.09 M/S
TDI: 0.11 M/S
TR MAX PG: 20 MMHG

## 2020-02-07 PROCEDURE — 93017 CV STRESS TEST TRACING ONLY: CPT

## 2020-02-07 PROCEDURE — 93306 TTE W/DOPPLER COMPLETE: CPT

## 2020-02-18 NOTE — PROGRESS NOTES
Nutrition: Met with pt today while she was here for chemo infusion #2. Pt is not currently a nutritional risk. Pt reports eating but not eating as healthy as she had been prior to treatment due to her and her  having C-Diff and unable to stand and prep foods. Pt informed me she had diarrhea for 4 days however that has resolved and she is feeling better. Wt: 100# weight stable. Discussed the importance of consume adequate calories in effort to continue to maintain weight. Discussed the importance of weight maintenance. Encouraged to call with questions. Will follow up and assist if and when needed. Kita Jimenez,MS, RD, LDN  
13.05

## 2020-03-05 DIAGNOSIS — E04.1 NONTOXIC UNINODULAR GOITER: Primary | ICD-10-CM

## 2020-03-09 ENCOUNTER — HOSPITAL ENCOUNTER (OUTPATIENT)
Dept: RADIOLOGY | Facility: HOSPITAL | Age: 60
Discharge: HOME | End: 2020-03-09
Attending: SURGERY
Payer: COMMERCIAL

## 2020-03-09 DIAGNOSIS — E04.1 NONTOXIC UNINODULAR GOITER: ICD-10-CM

## 2020-03-09 PROCEDURE — 76536 US EXAM OF HEAD AND NECK: CPT | Mod: TC,PO

## 2020-04-07 DIAGNOSIS — F51.01 PRIMARY INSOMNIA: ICD-10-CM

## 2020-04-07 DIAGNOSIS — F41.9 ANXIETY: ICD-10-CM

## 2020-04-07 RX ORDER — TEMAZEPAM 15 MG/1
15 CAPSULE ORAL NIGHTLY PRN
Qty: 90 CAPSULE | Refills: 1 | Status: SHIPPED | OUTPATIENT
Start: 2020-04-07 | End: 2020-05-26 | Stop reason: SDUPTHER

## 2020-04-07 RX ORDER — ALPRAZOLAM 0.5 MG/1
0.5 TABLET ORAL 2 TIMES DAILY PRN
Qty: 90 TABLET | Refills: 1 | Status: SHIPPED | OUTPATIENT
Start: 2020-04-07 | End: 2020-09-23 | Stop reason: SDUPTHER

## 2020-04-07 NOTE — TELEPHONE ENCOUNTER
----- Message from Valentina Lenz sent at 4/7/2020  9:39 AM CDT -----  [facility] temazepam   [facility]   [facility]  [phone]

## 2020-05-26 ENCOUNTER — OFFICE VISIT (OUTPATIENT)
Dept: FAMILY MEDICINE | Facility: CLINIC | Age: 60
End: 2020-05-26

## 2020-05-26 DIAGNOSIS — Z17.1 MALIGNANT NEOPLASM OF CENTRAL PORTION OF LEFT BREAST IN FEMALE, ESTROGEN RECEPTOR NEGATIVE: Primary | ICD-10-CM

## 2020-05-26 DIAGNOSIS — F51.01 PRIMARY INSOMNIA: ICD-10-CM

## 2020-05-26 DIAGNOSIS — F41.9 ANXIETY: ICD-10-CM

## 2020-05-26 DIAGNOSIS — C50.112 MALIGNANT NEOPLASM OF CENTRAL PORTION OF LEFT BREAST IN FEMALE, ESTROGEN RECEPTOR NEGATIVE: Primary | ICD-10-CM

## 2020-05-26 PROCEDURE — 99214 PR OFFICE/OUTPT VISIT, EST, LEVL IV, 30-39 MIN: ICD-10-PCS | Mod: 95,,, | Performed by: PHYSICIAN ASSISTANT

## 2020-05-26 PROCEDURE — 99214 OFFICE O/P EST MOD 30 MIN: CPT | Mod: 95,,, | Performed by: PHYSICIAN ASSISTANT

## 2020-05-26 RX ORDER — CITALOPRAM 10 MG/1
10 TABLET ORAL DAILY
Qty: 30 TABLET | Refills: 0 | Status: SHIPPED | OUTPATIENT
Start: 2020-05-26 | End: 2020-06-25

## 2020-05-26 RX ORDER — TEMAZEPAM 15 MG/1
15 CAPSULE ORAL NIGHTLY PRN
Qty: 90 CAPSULE | Refills: 1 | Status: SHIPPED | OUTPATIENT
Start: 2020-05-26 | End: 2020-10-01 | Stop reason: SDUPTHER

## 2020-05-26 RX ORDER — CITALOPRAM 10 MG/1
10 TABLET ORAL DAILY
Qty: 90 TABLET | Refills: 1 | Status: SHIPPED | OUTPATIENT
Start: 2020-05-26 | End: 2020-12-22 | Stop reason: SDUPTHER

## 2020-05-26 NOTE — PROGRESS NOTES
Subjective:        The chief complaint leading to consultation is: 6 month checkup  The patient location is:  Home  Visit type: Virtual visit with synchronous audio/video or audio only  This was a video visit in lieu of in-person visit due to the coronavirus emergency. Patient acknowledged and consented to the video visit encounter.     59 yo wf presents for regular checkup.  She reports that overall she has been feeling great.  She has no new concerns from a medical standpoint.  She still follows with Dr. Singh.  History of breast cancer.  Underwent bilateral mastectomy and reconstruction.  She is due for ultrasound for surveillance.  She is set to reschedule her appointment with him that got canceled due to COVID-19.  She does need refills today but had full blood work done for me at the last visit      Past Surgical History:   Procedure Laterality Date    AUGMENTATION OF BREAST      BREAST BIOPSY      BREAST RECONSTRUCTION      CHOLECYSTECTOMY      COLONOSCOPY  2009    HYSTERECTOMY      INSERTION OF BREAST IMPLANT Bilateral 11/5/2018    Procedure: INSERTION, BREAST IMPLANT;  Surgeon: Nedra Burden MD;  Location: [facility];  Service: Plastics;  Laterality: Bilateral;    MASTECTOMY Bilateral     reconstruction with tissue expanders    TISSUE EXPANDER REMOVAL Bilateral 11/5/2018    Procedure: REMOVAL, TISSUE EXPANDER;  Surgeon: Nedra Burden MD;  Location: [facility];  Service: Plastics;  Laterality: Bilateral;    TUBAL LIGATION       Past Medical History:   Diagnosis Date    Anxiety     Breast cancer     Cancer     breast    Depression     Encounter for blood transfusion     History of gastroesophageal reflux (GERD)     Hx pulmonary embolism     Meningitis     Osteoporosis     PONV (postoperative nausea and vomiting)     PVC's (premature ventricular contractions)      Family History   Problem Relation Age of Onset    Cancer Mother         breast    Heart disease Mother     Diabetes  Mother     Heart disease Father     Stroke Father     Heart failure Maternal Aunt     Heart failure Maternal Uncle         Social History:   Marital Status:   Alcohol History:  reports that she does not drink alcohol.  Tobacco History:  reports that she has never smoked. She has never used smokeless tobacco.  Drug History:  reports that she does not use drugs.    Review of patient's allergies indicates:   Allergen Reactions    Adhesive Rash    Fosamax [alendronate]      Bone erosion in jaw       Current Outpatient Medications   Medication Sig Dispense Refill    acyclovir (ZOVIRAX) 400 MG tablet Take 1 tablet (400 mg total) by mouth 2 (two) times daily. 180 tablet 1    ALPRAZolam (XANAX) 0.5 MG tablet Take 1 tablet (0.5 mg total) by mouth 2 (two) times daily as needed for Anxiety. 90 tablet 1    biotin 1 mg tablet Take 1,000 mcg by mouth 3 (three) times daily.      citalopram (CELEXA) 10 MG tablet Take 1 tablet (10 mg total) by mouth once daily. 90 tablet 1    citalopram (CELEXA) 10 MG tablet Take 1 tablet (10 mg total) by mouth once daily. 30 tablet 0    aewfqcnde-I3-[id number]-algal oil (METANX/FOLTANX RF) 3 mg-35 mg-2 mg -90.314 mg Cap Take 1 tablet by mouth once daily. 30 capsule 6    multivitamin capsule Take 1 capsule by mouth once daily.      temazepam (RESTORIL) 15 mg Cap Take 1 capsule (15 mg total) by mouth nightly as needed. 90 capsule 1    VITAMIN B COMPLEX (B COMPLEX-VITAMIN B12 ORAL) Take 1 tablet by mouth once daily.        No current facility-administered medications for this visit.      Facility-Administered Medications Ordered in Other Visits   Medication Dose Route Frequency Provider Last Rate Last Dose    lactated ringers infusion   Intravenous Continuous Charles Cheung MD        lidocaine (PF) 10 mg/ml (1%) injection 2 mg  0.2 mL Intradermal Once Charles Cheung MD           Review of Systems   Constitutional: Positive for activity change. Negative for unexpected  weight change.   HENT: Negative for hearing loss, rhinorrhea and trouble swallowing.    Eyes: Negative for discharge and visual disturbance.   Respiratory: Negative for chest tightness and wheezing.    Cardiovascular: Negative for chest pain and palpitations.   Gastrointestinal: Negative for blood in stool, constipation, diarrhea and vomiting.   Endocrine: Negative for polydipsia and polyuria.   Genitourinary: Negative for difficulty urinating, dysuria, hematuria and menstrual problem.   Musculoskeletal: Negative for arthralgias, joint swelling and neck pain.   Neurological: Negative for weakness and headaches.   Psychiatric/Behavioral: Positive for dysphoric mood. Negative for confusion.         Objective:        Physical Exam:   Physical Exam   Constitutional: She is oriented to person, place, and time. She appears well-developed and well-nourished. No distress.   HENT:   Head: Normocephalic and atraumatic.   Neck: Normal range of motion.   Pulmonary/Chest: Effort normal. No respiratory distress.   Neurological: She is alert and oriented to person, place, and time.   Psychiatric: She has a normal mood and affect.            Assessment:       1. Malignant neoplasm of central portion of left breast in female, estrogen receptor negative    2. Anxiety    3. Primary insomnia      Plan:   Malignant neoplasm of central portion of left breast in female, estrogen receptor negative  -      Breast Bilateral Complete; Future; Expected date: 05/26/2020    Anxiety  Comments:  mood and anxiety stable at this time.  Orders:  -     citalopram (CELEXA) 10 MG tablet; Take 1 tablet (10 mg total) by mouth once daily.  Dispense: 90 tablet; Refill: 1  -     citalopram (CELEXA) 10 MG tablet; Take 1 tablet (10 mg total) by mouth once daily.  Dispense: 30 tablet; Refill: 0    Primary insomnia  Comments:  refills as needed.  Orders:  -     temazepam (RESTORIL) 15 mg Cap; Take 1 capsule (15 mg total) by mouth nightly as needed.  Dispense:  90 capsule; Refill: 1      Follow up in about 6 months (around 11/26/2020) for Annual Physical.    Total time spent with patient: 20min    Each patient to whom he or she provides medical services by telemedicine is:  (1) informed of the relationship between the physician and patient and the respective role of any other health care provider with respect to management of the patient; and (2) notified that he or she may decline to receive medical services by telemedicine and may withdraw from such care at any time.    This note was created using [employer] voice recognition software that occasionally misinterprets phrases or words.

## 2020-05-26 NOTE — PATIENT INSTRUCTIONS
Anxiety Reaction  Anxiety is the feeling we all get when we think something bad might happen. It is a normal response to stress and usually causes only a mild reaction. When anxiety becomes more severe, it can interfere with daily life. In some cases, you may not even be aware of what it is youre anxious about. There may also be a genetic link or it may be a learned behavior in the home.  Both psychological and physical triggers cause stress reaction. It's often a response to fear or emotional stress, real or imagined. This stress may come from home, family, work, or social relationships.  During an anxiety reaction, you may feel:  · Helpless  · Nervous  · Depressed  · Irritable  Your body may show signs of anxiety in many ways. You may experience:  · Dry mouth  · Shakiness  · Dizziness  · Weakness  · Trouble breathing  · Breathing fast (hyperventilating)  · Chest pressure  · Sweating  · Headache  · Nausea  · Diarrhea  · Tiredness  · Inability to sleep  · Sexual problems  Home care  · Try to locate the sources of stress in your life. They may not be obvious. These may include:  ¨ Daily hassles of life (traffic jams, missed appointments, car troubles, etc.)  ¨ Major life changes, both good (new baby, job promotion) and bad (loss of job, loss of loved one)  ¨ Overload: feeling that you have too many responsibilities and can't take care of all of them at once  ¨ Feeling helpless, feeling that your problems are beyond what youre able to solve  · Notice how your body reacts to stress. Learn to listen to your body signals. This will help you take action before the stress becomes severe.  · When you can, do something about the source of your stress. (Avoid hassles, limit the amount of change that happens in your life at one time and take a break when you feel overloaded).  · Unfortunately, many stressful situations can't be avoided. It is necessary to learn how to better manage stress. There are many proven methods  that will reduce your anxiety. These include simple things like exercise, good nutrition and adequate rest. Also, there are certain techniques that are helpful:  ¨ Relaxation  ¨ Breathing exercises  ¨ Visualization  ¨ Biofeedback  ¨ Meditation  For more information about this, consult your doctor or go to a local bookstore and review the many books and tapes available on this subject.  Follow-up care  If you feel that your anxiety is not responding to self-help measures, contact your doctor or make an appointment with a counselor. You may need short-term psychological counseling and temporary medicine to help you manage stress.  Call 911  Call your healthcare provider right away if any of these occur:  · Trouble breathing  · Confusion  · Drowsiness or trouble wakening  · Fainting or loss of consciousness  · Rapid heart rate  · Seizure  · New chest pain that becomes more severe, lasts longer, or spreads into your shoulder, arm, neck, jaw, or back  When to seek medical advice  Call your healthcare provider right away if any of these occur:  · Your symptoms get worse  · Severe headache not relieved by rest and mild pain reliever  Date Last Reviewed: 9/29/2015  © 6627-7102 [employer]. [address] [place] [address]. All rights reserved. This information is not intended as a substitute for professional medical care. Always follow your healthcare professional's instructions.

## 2020-05-27 ENCOUNTER — TELEPHONE (OUTPATIENT)
Dept: FAMILY MEDICINE | Facility: CLINIC | Age: 60
End: 2020-05-27

## 2020-05-27 NOTE — TELEPHONE ENCOUNTER
----- Message from Valentina Lenz sent at 5/27/2020  4:17 PM CDT -----  Order for u/s of the breast for screening that was sent to [facility] is needing to be referred to [facility] per the breast specialist because [facility] doesn't do screenings, Suzette wants to know should they send the order to [facility] for the pt ?   [phone]   ----- Message -----  From: Rosy Mejia LPN  Sent: 5/27/2020   3:47 PM CDT  To: Valentina Lenz     Wait, what??  ----- Message -----  From: Valentina Lenz  Sent: 5/27/2020   3:32 PM CDT  To: Tommie Rojas Staff    Order for u/s of the breast for screening u.s and [facility] doesn't know screening wants to know should this go to [facility]. Breast specialist  referred we send her there   [phone]

## 2020-05-28 NOTE — TELEPHONE ENCOUNTER
I think she should be able to call and schedule this at [facility] without having to have a new order.  They are on epic as well

## 2020-05-29 ENCOUNTER — TELEPHONE (OUTPATIENT)
Dept: FAMILY MEDICINE | Facility: CLINIC | Age: 60
End: 2020-05-29

## 2020-06-30 ENCOUNTER — LAB VISIT (OUTPATIENT)
Dept: LAB | Facility: HOSPITAL | Age: 60
End: 2020-06-30
Attending: PHYSICIAN ASSISTANT
Payer: COMMERCIAL

## 2020-06-30 DIAGNOSIS — Z17.1 MALIGNANT NEOPLASM OF CENTRAL PORTION OF LEFT BREAST IN FEMALE, ESTROGEN RECEPTOR NEGATIVE: ICD-10-CM

## 2020-06-30 DIAGNOSIS — C50.112 MALIGNANT NEOPLASM OF CENTRAL PORTION OF LEFT BREAST IN FEMALE, ESTROGEN RECEPTOR NEGATIVE: ICD-10-CM

## 2020-06-30 LAB
ALBUMIN SERPL BCP-MCNC: 4.3 G/DL (ref 3.5–5.2)
ALP SERPL-CCNC: 55 U/L (ref 55–135)
ALT SERPL W/O P-5'-P-CCNC: 22 U/L (ref 10–44)
ANION GAP SERPL CALC-SCNC: 7 MMOL/L (ref 8–16)
AST SERPL-CCNC: 31 U/L (ref 10–40)
BASOPHILS # BLD AUTO: 0.05 K/UL (ref 0–0.2)
BASOPHILS NFR BLD: 0.9 % (ref 0–1.9)
BILIRUB SERPL-MCNC: 0.6 MG/DL (ref 0.1–1)
BUN SERPL-MCNC: 6 MG/DL (ref 8–23)
CALCIUM SERPL-MCNC: 9.3 MG/DL (ref 8.7–10.5)
CHLORIDE SERPL-SCNC: 102 MMOL/L (ref 95–110)
CO2 SERPL-SCNC: 30 MMOL/L (ref 23–29)
CREAT SERPL-MCNC: 0.6 MG/DL (ref 0.5–1.4)
DIFFERENTIAL METHOD: NORMAL
EOSINOPHIL # BLD AUTO: 0.1 K/UL (ref 0–0.5)
EOSINOPHIL NFR BLD: 0.9 % (ref 0–8)
ERYTHROCYTE [DISTWIDTH] IN BLOOD BY AUTOMATED COUNT: 13.3 % (ref 11.5–14.5)
EST. GFR  (AFRICAN AMERICAN): >60 ML/MIN/1.73 M^2
EST. GFR  (NON AFRICAN AMERICAN): >60 ML/MIN/1.73 M^2
GLUCOSE SERPL-MCNC: 91 MG/DL (ref 70–110)
HCT VFR BLD AUTO: 38.8 % (ref 37–48.5)
HGB BLD-MCNC: 12.6 G/DL (ref 12–16)
IMM GRANULOCYTES # BLD AUTO: 0.01 K/UL (ref 0–0.04)
IMM GRANULOCYTES NFR BLD AUTO: 0.2 % (ref 0–0.5)
LYMPHOCYTES # BLD AUTO: 1.4 K/UL (ref 1–4.8)
LYMPHOCYTES NFR BLD: 24.5 % (ref 18–48)
MCH RBC QN AUTO: 30.7 PG (ref 27–31)
MCHC RBC AUTO-ENTMCNC: 32.5 G/DL (ref 32–36)
MCV RBC AUTO: 94 FL (ref 82–98)
MONOCYTES # BLD AUTO: 0.5 K/UL (ref 0.3–1)
MONOCYTES NFR BLD: 8.7 % (ref 4–15)
NEUTROPHILS # BLD AUTO: 3.6 K/UL (ref 1.8–7.7)
NEUTROPHILS NFR BLD: 64.8 % (ref 38–73)
NRBC BLD-RTO: 0 /100 WBC
PLATELET # BLD AUTO: 244 K/UL (ref 150–350)
PMV BLD AUTO: 9.2 FL (ref 9.2–12.9)
POTASSIUM SERPL-SCNC: 4 MMOL/L (ref 3.5–5.1)
PROT SERPL-MCNC: 7.4 G/DL (ref 6–8.4)
RBC # BLD AUTO: 4.11 M/UL (ref 4–5.4)
SODIUM SERPL-SCNC: 139 MMOL/L (ref 136–145)
WBC # BLD AUTO: 5.51 K/UL (ref 3.9–12.7)

## 2020-06-30 PROCEDURE — 80053 COMPREHEN METABOLIC PANEL: CPT

## 2020-06-30 PROCEDURE — 36415 COLL VENOUS BLD VENIPUNCTURE: CPT

## 2020-06-30 PROCEDURE — 85025 COMPLETE CBC W/AUTO DIFF WBC: CPT

## 2020-09-23 DIAGNOSIS — F41.9 ANXIETY: ICD-10-CM

## 2020-09-23 NOTE — TELEPHONE ENCOUNTER
----- Message from Qian Collazo sent at 9/23/2020  2:43 PM CDT -----  VM @ 2:33 pt requesting refill on Alprazolam 0.5 mg to  [facility]  [phone]

## 2020-09-24 RX ORDER — ALPRAZOLAM 0.5 MG/1
0.5 TABLET ORAL 2 TIMES DAILY PRN
Qty: 90 TABLET | Refills: 1 | Status: SHIPPED | OUTPATIENT
Start: 2020-09-24 | End: 2020-12-28 | Stop reason: SDUPTHER

## 2020-09-29 ENCOUNTER — TELEPHONE (OUTPATIENT)
Dept: FAMILY MEDICINE | Facility: CLINIC | Age: 60
End: 2020-09-29

## 2020-09-29 NOTE — TELEPHONE ENCOUNTER
----- Message from Mackenzie Ramirez sent at 9/29/2020  8:49 AM CDT -----   8:38  She called a few days ago for  Tommie to call in a new prescription for Alprazolam to [facility]. Please call and let her know if it was sent. Pt's #  [phone]  GH

## 2020-10-01 DIAGNOSIS — F51.01 PRIMARY INSOMNIA: ICD-10-CM

## 2020-10-01 RX ORDER — TEMAZEPAM 15 MG/1
15 CAPSULE ORAL NIGHTLY PRN
Qty: 90 CAPSULE | Refills: 1 | Status: SHIPPED | OUTPATIENT
Start: 2020-10-01 | End: 2020-12-28 | Stop reason: SDUPTHER

## 2020-10-01 NOTE — TELEPHONE ENCOUNTER
----- Message from Kavita Fitzpatrick sent at 10/1/2020  3:50 PM CDT -----  Regarding: Refill  Contact: Hollie Tsang  Needs refill onTemazepam   [facility]  Pt# [phone]

## 2020-10-20 RX ORDER — ACYCLOVIR 400 MG/1
400 TABLET ORAL 2 TIMES DAILY
Qty: 180 TABLET | Refills: 1 | Status: SHIPPED | OUTPATIENT
Start: 2020-10-20 | End: 2020-12-28 | Stop reason: SDUPTHER

## 2020-11-17 ENCOUNTER — TELEPHONE (OUTPATIENT)
Dept: FAMILY MEDICINE | Facility: CLINIC | Age: 60
End: 2020-11-17

## 2020-11-17 DIAGNOSIS — Z79.899 ENCOUNTER FOR LONG-TERM (CURRENT) USE OF OTHER MEDICATIONS: Primary | ICD-10-CM

## 2020-11-17 NOTE — TELEPHONE ENCOUNTER
Spoke with pt and let them know we are putting in labs to be done before the office visit.    Labs set up for you to sign.

## 2020-11-25 LAB
APPEARANCE UR: CLEAR
BACTERIA #/AREA URNS HPF: ABNORMAL /HPF
BACTERIA UR CULT: NORMAL
BILIRUB UR QL STRIP: NEGATIVE
CHOLEST SERPL-MCNC: 182 MG/DL
CHOLEST/HDLC SERPL: 3 (CALC)
COLOR UR: YELLOW
GLUCOSE UR QL STRIP: NEGATIVE
HDLC SERPL-MCNC: 60 MG/DL
HGB UR QL STRIP: NEGATIVE
HYALINE CASTS #/AREA URNS LPF: ABNORMAL /LPF
KETONES UR QL STRIP: NEGATIVE
LDLC SERPL CALC-MCNC: 105 MG/DL (CALC)
LEUKOCYTE ESTERASE UR QL STRIP: ABNORMAL
NITRITE UR QL STRIP: NEGATIVE
NONHDLC SERPL-MCNC: 122 MG/DL (CALC)
PH UR STRIP: 7 [PH] (ref 5–8)
PROT UR QL STRIP: NEGATIVE
RBC #/AREA URNS HPF: ABNORMAL /HPF
SP GR UR STRIP: 1.01 (ref 1–1.03)
SQUAMOUS #/AREA URNS HPF: ABNORMAL /HPF
TRIGL SERPL-MCNC: 84 MG/DL
TSH SERPL-ACNC: 0.69 MIU/L (ref 0.4–4.5)
WBC #/AREA URNS HPF: ABNORMAL /HPF

## 2020-12-10 ENCOUNTER — TELEPHONE (OUTPATIENT)
Dept: FAMILY MEDICINE | Facility: CLINIC | Age: 60
End: 2020-12-10

## 2020-12-10 NOTE — TELEPHONE ENCOUNTER
Spoke to pt regarding message below. Pt states she just received rx from [facility]. Pt states she does not need anything refilled until her upcomming appointment. Pt encouraged to call if she needs anything.

## 2020-12-10 NOTE — TELEPHONE ENCOUNTER
----- Message from Surekha Ruiz sent at 12/10/2020 11:13 AM CST -----  - pt is calling to get a refill   [phone]

## 2020-12-28 ENCOUNTER — OFFICE VISIT (OUTPATIENT)
Dept: FAMILY MEDICINE | Facility: CLINIC | Age: 60
End: 2020-12-28
Payer: COMMERCIAL

## 2020-12-28 VITALS
HEIGHT: 62 IN | DIASTOLIC BLOOD PRESSURE: 68 MMHG | HEART RATE: 64 BPM | BODY MASS INDEX: 18.4 KG/M2 | SYSTOLIC BLOOD PRESSURE: 112 MMHG | WEIGHT: 100 LBS

## 2020-12-28 DIAGNOSIS — F41.9 ANXIETY: ICD-10-CM

## 2020-12-28 DIAGNOSIS — F51.01 PRIMARY INSOMNIA: ICD-10-CM

## 2020-12-28 PROCEDURE — 3008F PR BODY MASS INDEX (BMI) DOCUMENTED: ICD-10-PCS | Mod: S$GLB,,, | Performed by: PHYSICIAN ASSISTANT

## 2020-12-28 PROCEDURE — 3008F BODY MASS INDEX DOCD: CPT | Mod: S$GLB,,, | Performed by: PHYSICIAN ASSISTANT

## 2020-12-28 PROCEDURE — 99213 OFFICE O/P EST LOW 20 MIN: CPT | Mod: S$GLB,,, | Performed by: PHYSICIAN ASSISTANT

## 2020-12-28 PROCEDURE — 99213 PR OFFICE/OUTPT VISIT, EST, LEVL III, 20-29 MIN: ICD-10-PCS | Mod: S$GLB,,, | Performed by: PHYSICIAN ASSISTANT

## 2020-12-28 RX ORDER — CITALOPRAM 10 MG/1
10 TABLET ORAL DAILY
Qty: 30 TABLET | Refills: 0 | Status: SHIPPED | OUTPATIENT
Start: 2020-12-28 | End: 2021-01-27

## 2020-12-28 RX ORDER — CITALOPRAM 10 MG/1
10 TABLET ORAL DAILY
Qty: 90 TABLET | Refills: 1 | Status: SHIPPED | OUTPATIENT
Start: 2020-12-28 | End: 2021-06-16 | Stop reason: SDUPTHER

## 2020-12-28 RX ORDER — TEMAZEPAM 15 MG/1
15 CAPSULE ORAL NIGHTLY PRN
Qty: 90 CAPSULE | Refills: 1 | Status: SHIPPED | OUTPATIENT
Start: 2020-12-28 | End: 2021-06-22 | Stop reason: SDUPTHER

## 2020-12-28 RX ORDER — ACYCLOVIR 400 MG/1
400 TABLET ORAL 2 TIMES DAILY
Qty: 180 TABLET | Refills: 1 | Status: SHIPPED | OUTPATIENT
Start: 2020-12-28 | End: 2022-01-18 | Stop reason: SDUPTHER

## 2020-12-28 RX ORDER — ALPRAZOLAM 0.5 MG/1
0.5 TABLET ORAL 2 TIMES DAILY PRN
Qty: 90 TABLET | Refills: 1 | Status: SHIPPED | OUTPATIENT
Start: 2020-12-28 | End: 2021-04-19 | Stop reason: SDUPTHER

## 2020-12-28 NOTE — PATIENT INSTRUCTIONS
Anxiety Reaction  Anxiety is the feeling we all get when we think something bad might happen. It is a normal response to stress and usually causes only a mild reaction. When anxiety becomes more severe, it can interfere with daily life. In some cases, you may not even be aware of what it is youre anxious about. There may also be a genetic link or it may be a learned behavior in the home.  Both psychological and physical triggers cause stress reaction. It's often a response to fear or emotional stress, real or imagined. This stress may come from home, family, work, or social relationships.  During an anxiety reaction, you may feel:  · Helpless  · Nervous  · Depressed  · Irritable  Your body may show signs of anxiety in many ways. You may experience:  · Dry mouth  · Shakiness  · Dizziness  · Weakness  · Trouble breathing  · Breathing fast (hyperventilating)  · Chest pressure  · Sweating  · Headache  · Nausea  · Diarrhea  · Tiredness  · Inability to sleep  · Sexual problems  Home care  · Try to locate the sources of stress in your life. They may not be obvious. These may include:  ¨ Daily hassles of life (traffic jams, missed appointments, car troubles, etc.)  ¨ Major life changes, both good (new baby, job promotion) and bad (loss of job, loss of loved one)  ¨ Overload: feeling that you have too many responsibilities and can't take care of all of them at once  ¨ Feeling helpless, feeling that your problems are beyond what youre able to solve  · Notice how your body reacts to stress. Learn to listen to your body signals. This will help you take action before the stress becomes severe.  · When you can, do something about the source of your stress. (Avoid hassles, limit the amount of change that happens in your life at one time and take a break when you feel overloaded).  · Unfortunately, many stressful situations can't be avoided. It is necessary to learn how to better manage stress. There are many proven methods  that will reduce your anxiety. These include simple things like exercise, good nutrition and adequate rest. Also, there are certain techniques that are helpful:  ¨ Relaxation  ¨ Breathing exercises  ¨ Visualization  ¨ Biofeedback  ¨ Meditation  For more information about this, consult your doctor or go to a local bookstore and review the many books and tapes available on this subject.  Follow-up care  If you feel that your anxiety is not responding to self-help measures, contact your doctor or make an appointment with a counselor. You may need short-term psychological counseling and temporary medicine to help you manage stress.  Call 911  Call your healthcare provider right away if any of these occur:  · Trouble breathing  · Confusion  · Drowsiness or trouble wakening  · Fainting or loss of consciousness  · Rapid heart rate  · Seizure  · New chest pain that becomes more severe, lasts longer, or spreads into your shoulder, arm, neck, jaw, or back  When to seek medical advice  Call your healthcare provider right away if any of these occur:  · Your symptoms get worse  · Severe headache not relieved by rest and mild pain reliever  Date Last Reviewed: 9/29/2015  © 5933-2990 [employer]. [address] [place] [address]. All rights reserved. This information is not intended as a substitute for professional medical care. Always follow your healthcare professional's instructions.

## 2020-12-28 NOTE — PROGRESS NOTES
SUBJECTIVE:    Patient ID: Hollie Tsang is a 60 y.o. female.    Chief Complaint: Annual Exam (Wellness Visit, no bottles, went over meds verbally// SW)    This is a 60-year-old female who presents today for annual wellness exam.  She reports that she has been doing well overall.  She just had labs done for review in November.  Thyroid and cholesterol look stable at this time. She does have her brother and mother now in nursing facilities. Medically reports that she has been doing ok. Working from home mostly. Making it work.      Telephone on 11/17/2020   Component Date Value Ref Range Status    TSH w/reflex to FT4 11/23/2020 0.69  0.40 - 4.50 mIU/L Final    Cholesterol 11/23/2020 182  <200 mg/dL Final    HDL 11/23/2020 60  > OR = 50 mg/dL Final    Triglycerides 11/23/2020 84  <150 mg/dL Final    LDL Cholesterol 11/23/2020 105* mg/dL (calc) Final    HDL/Cholesterol Ratio 11/23/2020 3.0  <5.0 (calc) Final    Non HDL Chol. (LDL+VLDL) 11/23/2020 122  <130 mg/dL (calc) Final    Color, UA 11/23/2020 YELLOW  YELLOW Final    Appearance, UA 11/23/2020 CLEAR  CLEAR Final    Specific [place], UA 11/23/2020 1.009  1.001 - 1.035 Final    pH, UA 11/23/2020 7.0  5.0 - 8.0 Final    Glucose, UA 11/23/2020 NEGATIVE  NEGATIVE Final    Bilirubin, UA 11/23/2020 NEGATIVE  NEGATIVE Final    Ketones, UA 11/23/2020 NEGATIVE  NEGATIVE Final    Occult Blood UA 11/23/2020 NEGATIVE  NEGATIVE Final    Protein, UA 11/23/2020 NEGATIVE  NEGATIVE Final    Nitrite, UA 11/23/2020 NEGATIVE  NEGATIVE Final    Leukocytes, UA 11/23/2020 1+* NEGATIVE Final    WBC Casts, UA 11/23/2020 0-5  < OR = 5 /HPF Final    RBC Casts, UA 11/23/2020 NONE SEEN  < OR = 2 /HPF Final    Squam Epithel, UA 11/23/2020 0-5  < OR = 5 /HPF Final    Bacteria, UA 11/23/2020 NONE SEEN  NONE SEEN /HPF Final    Hyaline Casts, UA 11/23/2020 NONE SEEN  NONE SEEN /LPF Final    Urine Culture, Routine 11/23/2020    Final   Lab Visit on 06/30/2020   Component Date  Value Ref Range Status    WBC 06/30/2020 5.51  3.90 - 12.70 K/uL Final    RBC 06/30/2020 4.11  4.00 - 5.40 M/uL Final    Hemoglobin 06/30/2020 12.6  12.0 - 16.0 g/dL Final    Hematocrit 06/30/2020 38.8  37.0 - 48.5 % Final    MCV 06/30/2020 94  82 - 98 fL Final    MCH 06/30/2020 30.7  27.0 - 31.0 pg Final    MCHC 06/30/2020 32.5  32.0 - 36.0 g/dL Final    RDW 06/30/2020 13.3  11.5 - 14.5 % Final    Platelets 06/30/2020 244  150 - 350 K/uL Final    MPV 06/30/2020 9.2  9.2 - 12.9 fL Final    Immature Granulocytes 06/30/2020 0.2  0.0 - 0.5 % Final    Gran # (ANC) 06/30/2020 3.6  1.8 - 7.7 K/uL Final    Immature Grans (Abs) 06/30/2020 0.01  0.00 - 0.04 K/uL Final    Lymph # 06/30/2020 1.4  1.0 - 4.8 K/uL Final    Mono # 06/30/2020 0.5  0.3 - 1.0 K/uL Final    Eos # 06/30/2020 0.1  0.0 - 0.5 K/uL Final    Baso # 06/30/2020 0.05  0.00 - 0.20 K/uL Final    nRBC 06/30/2020 0  0 /100 WBC Final    Gran % 06/30/2020 64.8  38.0 - 73.0 % Final    Lymph % 06/30/2020 24.5  18.0 - 48.0 % Final    Mono % 06/30/2020 8.7  4.0 - 15.0 % Final    Eosinophil % 06/30/2020 0.9  0.0 - 8.0 % Final    Basophil % 06/30/2020 0.9  0.0 - 1.9 % Final    Differential Method 06/30/2020 Automated   Final    Sodium 06/30/2020 139  136 - 145 mmol/L Final    Potassium 06/30/2020 4.0  3.5 - 5.1 mmol/L Final    Chloride 06/30/2020 102  95 - 110 mmol/L Final    CO2 06/30/2020 30* 23 - 29 mmol/L Final    Glucose 06/30/2020 91  70 - 110 mg/dL Final    BUN 06/30/2020 6* 8 - 23 mg/dL Final    Creatinine 06/30/2020 0.6  0.5 - 1.4 mg/dL Final    Calcium 06/30/2020 9.3  8.7 - 10.5 mg/dL Final    Total Protein 06/30/2020 7.4  6.0 - 8.4 g/dL Final    Albumin 06/30/2020 4.3  3.5 - 5.2 g/dL Final    Total Bilirubin 06/30/2020 0.6  0.1 - 1.0 mg/dL Final    Alkaline Phosphatase 06/30/2020 55  55 - 135 U/L Final    AST 06/30/2020 31  10 - 40 U/L Final    ALT 06/30/2020 22  10 - 44 U/L Final    Anion Gap 06/30/2020 7* 8 - 16 mmol/L  Final    eGFR if African American 06/30/2020 >60.0  >60 mL/min/1.73 m^2 Final    eGFR if non African American 06/30/2020 >60.0  >60 mL/min/1.73 m^2 Final       Past Medical History:   Diagnosis Date    Anxiety     Breast cancer     Cancer     breast    Depression     Encounter for blood transfusion     History of gastroesophageal reflux (GERD)     Hx pulmonary embolism     Meningitis     Osteoporosis     PONV (postoperative nausea and vomiting)     PVC's (premature ventricular contractions)      Past Surgical History:   Procedure Laterality Date    AUGMENTATION OF BREAST      BREAST BIOPSY      BREAST RECONSTRUCTION      CHOLECYSTECTOMY      COLONOSCOPY  2009    HYSTERECTOMY      INSERTION OF BREAST IMPLANT Bilateral 11/5/2018    Procedure: INSERTION, BREAST IMPLANT;  Surgeon: Nedra Burden MD;  [facility];  Service: Plastics;  Laterality: Bilateral;    MASTECTOMY Bilateral     reconstruction with tissue expanders    TISSUE EXPANDER REMOVAL Bilateral 11/5/2018    Procedure: REMOVAL, TISSUE EXPANDER;  Surgeon: Nedra Burden MD;  Location: [facility];  Service: Plastics;  Laterality: Bilateral;    TUBAL LIGATION       Family History   Problem Relation Age of Onset    Cancer Mother         breast    Heart disease Mother     Diabetes Mother     Heart disease Father     Stroke Father     Heart failure Maternal Aunt     Heart failure Maternal Uncle        Marital Status:   Alcohol History:  reports no history of alcohol use.  Tobacco History:  reports that she has never smoked. She has never used smokeless tobacco.  Drug History:  reports no history of drug use.    Review of patient's allergies indicates:   Allergen Reactions    Adhesive Rash    Fosamax [alendronate]      Bone erosion in jaw    Meperidine        Current Outpatient Medications:     acyclovir (ZOVIRAX) 400 MG tablet, Take 1 tablet (400 mg total) by mouth 2 (two) times daily., Disp: 180 tablet, Rfl: 1     "ALPRAZolam (XANAX) 0.5 MG tablet, Take 1 tablet (0.5 mg total) by mouth 2 (two) times daily as needed for Anxiety., Disp: 90 tablet, Rfl: 1    biotin 1 mg tablet, Take 1,000 mcg by mouth 3 (three) times daily., Disp: , Rfl:     citalopram (CELEXA) 10 MG tablet, Take 1 tablet (10 mg total) by mouth once daily., Disp: 90 tablet, Rfl: 1    citalopram (CELEXA) 10 MG tablet, Take 1 tablet (10 mg total) by mouth once daily., Disp: 30 tablet, Rfl: 0    [id number]-algal oil (METANX/FOLTANX RF) 3 mg-35 mg-2 mg -90.314 mg Cap, Take 1 tablet by mouth once daily., Disp: 30 capsule, Rfl: 6    multivitamin capsule, Take 1 capsule by mouth once daily., Disp: , Rfl:     temazepam (RESTORIL) 15 mg Cap, Take 1 capsule (15 mg total) by mouth nightly as needed., Disp: 90 capsule, Rfl: 1    VITAMIN B COMPLEX (B COMPLEX-VITAMIN B12 ORAL), Take 1 tablet by mouth once daily. , Disp: , Rfl:   No current facility-administered medications for this visit.     Facility-Administered Medications Ordered in Other Visits:     lactated ringers infusion, , Intravenous, Continuous, Charles Cheung MD    lidocaine (PF) 10 mg/ml (1%) injection 2 mg, 0.2 mL, Intradermal, Once, Charles Cheung MD    Review of Systems   Constitutional: Negative for appetite change, chills, fatigue, fever and unexpected weight change.   HENT: Negative for congestion.    Respiratory: Negative for cough, chest tightness and shortness of breath.    Cardiovascular: Negative for chest pain and palpitations.   Gastrointestinal: Negative for abdominal distention and abdominal pain.   Endocrine: Negative for cold intolerance and heat intolerance.   Genitourinary: Negative for difficulty urinating and dysuria.   Musculoskeletal: Negative for arthralgias and back pain.   Neurological: Negative for dizziness, weakness and headaches.          Objective:      Vitals:    12/28/20 0906   BP: 112/68   Pulse: 64   Weight: 45.4 kg (100 lb)   Height: 5' 2" (1.575 m) "     Physical Exam  Constitutional:       General: She is not in acute distress.     Appearance: She is well-developed.   HENT:      Head: Normocephalic and atraumatic.   Eyes:      Conjunctiva/sclera: Conjunctivae normal.      Pupils: Pupils are equal, round, and reactive to light.   Neck:      Musculoskeletal: Normal range of motion and neck supple.      Thyroid: No thyromegaly.   Cardiovascular:      Rate and Rhythm: Normal rate and regular rhythm.      Heart sounds: Normal heart sounds.   Pulmonary:      Effort: Pulmonary effort is normal.      Breath sounds: Normal breath sounds.   Abdominal:      General: Bowel sounds are normal. There is no distension.      Palpations: Abdomen is soft.      Tenderness: There is no abdominal tenderness.   Musculoskeletal: Normal range of motion.   Skin:     General: Skin is warm and dry.      Findings: No erythema.   Neurological:      Mental Status: She is alert and oriented to person, place, and time.      Cranial Nerves: No cranial nerve deficit.           Assessment:       1. Anxiety    2. Primary insomnia         Plan:       Anxiety  Comments:  mood and anxiety stable at this time.  Orders:  -     ALPRAZolam (XANAX) 0.5 MG tablet; Take 1 tablet (0.5 mg total) by mouth 2 (two) times daily as needed for Anxiety.  Dispense: 90 tablet; Refill: 1  -     citalopram (CELEXA) 10 MG tablet; Take 1 tablet (10 mg total) by mouth once daily.  Dispense: 90 tablet; Refill: 1  -     citalopram (CELEXA) 10 MG tablet; Take 1 tablet (10 mg total) by mouth once daily.  Dispense: 30 tablet; Refill: 0    Primary insomnia  Comments:  refills as needed.  Orders:  -     temazepam (RESTORIL) 15 mg Cap; Take 1 capsule (15 mg total) by mouth nightly as needed.  Dispense: 90 capsule; Refill: 1    Other orders  -     acyclovir (ZOVIRAX) 400 MG tablet; Take 1 tablet (400 mg total) by mouth 2 (two) times daily.  Dispense: 180 tablet; Refill: 1      Follow up in about 6 months (around 6/28/2021) for  checkup.        12/28/2020 Tommie Rojas PA-C

## 2021-01-12 ENCOUNTER — LAB VISIT (OUTPATIENT)
Dept: LAB | Facility: HOSPITAL | Age: 61
End: 2021-01-12
Attending: PHYSICIAN ASSISTANT
Payer: COMMERCIAL

## 2021-01-12 DIAGNOSIS — Z17.1 MALIGNANT NEOPLASM OF CENTRAL PORTION OF LEFT BREAST IN FEMALE, ESTROGEN RECEPTOR NEGATIVE: ICD-10-CM

## 2021-01-12 DIAGNOSIS — C50.112 MALIGNANT NEOPLASM OF CENTRAL PORTION OF LEFT BREAST IN FEMALE, ESTROGEN RECEPTOR NEGATIVE: ICD-10-CM

## 2021-01-12 LAB
ALBUMIN SERPL BCP-MCNC: 4.7 G/DL (ref 3.5–5.2)
ALP SERPL-CCNC: 67 U/L (ref 38–145)
ALT SERPL W/O P-5'-P-CCNC: 23 U/L (ref 0–35)
ANION GAP SERPL CALC-SCNC: 8 MMOL/L (ref 8–16)
AST SERPL-CCNC: 38 U/L (ref 14–36)
BASOPHILS # BLD AUTO: 0.05 K/UL (ref 0–0.2)
BASOPHILS NFR BLD: 1 % (ref 0–1.9)
BILIRUB SERPL-MCNC: 0.6 MG/DL (ref 0.2–1.3)
CALCIUM SERPL-MCNC: 9.8 MG/DL (ref 8.4–10.2)
CHLORIDE SERPL-SCNC: 102 MMOL/L (ref 95–110)
CO2 SERPL-SCNC: 30 MMOL/L (ref 22–31)
CREAT SERPL-MCNC: 0.61 MG/DL (ref 0.5–1.4)
DIFFERENTIAL METHOD: ABNORMAL
EOSINOPHIL # BLD AUTO: 0 K/UL (ref 0–0.5)
EOSINOPHIL NFR BLD: 0.8 % (ref 0–8)
ERYTHROCYTE [DISTWIDTH] IN BLOOD BY AUTOMATED COUNT: 13 % (ref 11.5–14.5)
EST. GFR  (AFRICAN AMERICAN): >60 ML/MIN/1.73 M^2
EST. GFR  (NON AFRICAN AMERICAN): >60 ML/MIN/1.73 M^2
GLUCOSE SERPL-MCNC: 103 MG/DL (ref 70–110)
HCT VFR BLD AUTO: 41.6 % (ref 37–48.5)
HGB BLD-MCNC: 13.6 G/DL (ref 12–16)
IMM GRANULOCYTES # BLD AUTO: 0.01 K/UL (ref 0–0.04)
IMM GRANULOCYTES NFR BLD AUTO: 0.2 % (ref 0–0.5)
LYMPHOCYTES # BLD AUTO: 1.4 K/UL (ref 1–4.8)
LYMPHOCYTES NFR BLD: 28.4 % (ref 18–48)
MCH RBC QN AUTO: 30.6 PG (ref 27–31)
MCHC RBC AUTO-ENTMCNC: 32.7 G/DL (ref 32–36)
MCV RBC AUTO: 94 FL (ref 82–98)
MONOCYTES # BLD AUTO: 0.4 K/UL (ref 0.3–1)
MONOCYTES NFR BLD: 8.9 % (ref 4–15)
NEUTROPHILS # BLD AUTO: 2.9 K/UL (ref 1.8–7.7)
NEUTROPHILS NFR BLD: 60.7 % (ref 38–73)
NRBC BLD-RTO: 0 /100 WBC
PLATELET # BLD AUTO: 246 K/UL (ref 150–350)
PMV BLD AUTO: 8.7 FL (ref 9.2–12.9)
POTASSIUM SERPL-SCNC: 4 MMOL/L (ref 3.5–5.1)
PROT SERPL-MCNC: 7.5 G/DL (ref 6–8.4)
RBC # BLD AUTO: 4.44 M/UL (ref 4–5.4)
SODIUM SERPL-SCNC: 140 MMOL/L (ref 136–145)
UUN UR-MCNC: 10 MG/DL (ref 7–18)
WBC # BLD AUTO: 4.82 K/UL (ref 3.9–12.7)

## 2021-01-12 PROCEDURE — 36415 COLL VENOUS BLD VENIPUNCTURE: CPT | Mod: PN

## 2021-01-12 PROCEDURE — 80053 COMPREHEN METABOLIC PANEL: CPT

## 2021-01-12 PROCEDURE — 85025 COMPLETE CBC W/AUTO DIFF WBC: CPT

## 2021-01-12 PROCEDURE — 85025 COMPLETE CBC W/AUTO DIFF WBC: CPT | Mod: PN

## 2021-01-12 PROCEDURE — 80053 COMPREHEN METABOLIC PANEL: CPT | Mod: PN

## 2021-02-01 DIAGNOSIS — M81.0 SENILE OSTEOPOROSIS: ICD-10-CM

## 2021-02-01 DIAGNOSIS — Z13.820 SPECIAL SCREENING FOR OSTEOPOROSIS: Primary | ICD-10-CM

## 2021-03-10 ENCOUNTER — HOSPITAL ENCOUNTER (OUTPATIENT)
Dept: RADIOLOGY | Facility: HOSPITAL | Age: 61
Discharge: HOME | End: 2021-03-10
Attending: SPECIALIST

## 2021-03-10 DIAGNOSIS — M81.0 SENILE OSTEOPOROSIS: ICD-10-CM

## 2021-03-10 DIAGNOSIS — Z13.820 SPECIAL SCREENING FOR OSTEOPOROSIS: ICD-10-CM

## 2021-03-10 PROCEDURE — 77080 DXA BONE DENSITY AXIAL: CPT | Mod: TC,PO

## 2021-03-17 DIAGNOSIS — F32.9 REACTIVE DEPRESSION: Primary | ICD-10-CM

## 2021-03-17 RX ORDER — MIRTAZAPINE 15 MG/1
15 TABLET, FILM COATED ORAL NIGHTLY
Qty: 30 TABLET | Refills: 3 | Status: SHIPPED | OUTPATIENT
Start: 2021-03-17 | End: 2021-06-28

## 2021-03-24 ENCOUNTER — IMMUNIZATION (OUTPATIENT)
Dept: PRIMARY CARE CLINIC | Facility: CLINIC | Age: 61
End: 2021-03-24
Payer: COMMERCIAL

## 2021-03-24 DIAGNOSIS — Z23 NEED FOR VACCINATION: Primary | ICD-10-CM

## 2021-03-24 PROCEDURE — 91300 COVID-19, MRNA, LNP-S, PF, 30 MCG/0.3 ML DOSE VACCINE: CPT | Mod: S$GLB,,, | Performed by: FAMILY MEDICINE

## 2021-03-24 PROCEDURE — 91300 COVID-19, MRNA, LNP-S, PF, 30 MCG/0.3 ML DOSE VACCINE: ICD-10-PCS | Mod: S$GLB,,, | Performed by: FAMILY MEDICINE

## 2021-03-24 PROCEDURE — 0001A COVID-19, MRNA, LNP-S, PF, 30 MCG/0.3 ML DOSE VACCINE: ICD-10-PCS | Mod: CV19,S$GLB,, | Performed by: FAMILY MEDICINE

## 2021-03-24 PROCEDURE — 0001A COVID-19, MRNA, LNP-S, PF, 30 MCG/0.3 ML DOSE VACCINE: CPT | Mod: CV19,S$GLB,, | Performed by: FAMILY MEDICINE

## 2021-04-01 ENCOUNTER — PATIENT MESSAGE (OUTPATIENT)
Dept: FAMILY MEDICINE | Facility: CLINIC | Age: 61
End: 2021-04-01

## 2021-04-01 DIAGNOSIS — E04.1 THYROID NODULE: Primary | ICD-10-CM

## 2021-04-19 ENCOUNTER — IMMUNIZATION (OUTPATIENT)
Dept: PRIMARY CARE CLINIC | Facility: CLINIC | Age: 61
End: 2021-04-19
Payer: COMMERCIAL

## 2021-04-19 DIAGNOSIS — F41.9 ANXIETY: ICD-10-CM

## 2021-04-19 DIAGNOSIS — Z23 NEED FOR VACCINATION: Primary | ICD-10-CM

## 2021-04-19 PROCEDURE — 0002A COVID-19, MRNA, LNP-S, PF, 30 MCG/0.3 ML DOSE VACCINE: ICD-10-PCS | Mod: CV19,S$GLB,, | Performed by: PHYSICIAN ASSISTANT

## 2021-04-19 PROCEDURE — 0002A COVID-19, MRNA, LNP-S, PF, 30 MCG/0.3 ML DOSE VACCINE: CPT | Mod: CV19,S$GLB,, | Performed by: PHYSICIAN ASSISTANT

## 2021-04-19 PROCEDURE — 91300 COVID-19, MRNA, LNP-S, PF, 30 MCG/0.3 ML DOSE VACCINE: CPT | Mod: S$GLB,,, | Performed by: PHYSICIAN ASSISTANT

## 2021-04-19 PROCEDURE — 91300 COVID-19, MRNA, LNP-S, PF, 30 MCG/0.3 ML DOSE VACCINE: ICD-10-PCS | Mod: S$GLB,,, | Performed by: PHYSICIAN ASSISTANT

## 2021-04-19 RX ORDER — ALPRAZOLAM 0.5 MG/1
0.5 TABLET ORAL 2 TIMES DAILY PRN
Qty: 90 TABLET | Refills: 1 | Status: SHIPPED | OUTPATIENT
Start: 2021-04-19 | End: 2021-07-21 | Stop reason: SDUPTHER

## 2021-04-20 ENCOUNTER — TELEPHONE (OUTPATIENT)
Dept: FAMILY MEDICINE | Facility: CLINIC | Age: 61
End: 2021-04-20

## 2021-04-20 ENCOUNTER — HOSPITAL ENCOUNTER (OUTPATIENT)
Dept: RADIOLOGY | Facility: HOSPITAL | Age: 61
Discharge: HOME | End: 2021-04-20
Attending: PHYSICIAN ASSISTANT
Payer: COMMERCIAL

## 2021-04-20 DIAGNOSIS — E04.1 THYROID NODULE: ICD-10-CM

## 2021-04-20 PROCEDURE — 76536 US EXAM OF HEAD AND NECK: CPT | Mod: TC,PO

## 2021-04-26 ENCOUNTER — OFFICE VISIT (OUTPATIENT)
Dept: FAMILY MEDICINE | Facility: CLINIC | Age: 61
End: 2021-04-26
Payer: COMMERCIAL

## 2021-04-26 VITALS
DIASTOLIC BLOOD PRESSURE: 82 MMHG | WEIGHT: 95 LBS | SYSTOLIC BLOOD PRESSURE: 122 MMHG | HEIGHT: 62 IN | HEART RATE: 69 BPM | BODY MASS INDEX: 17.48 KG/M2

## 2021-04-26 DIAGNOSIS — H61.23 BILATERAL IMPACTED CERUMEN: Primary | ICD-10-CM

## 2021-04-26 DIAGNOSIS — H66.002 ACUTE SUPPURATIVE OTITIS MEDIA OF LEFT EAR WITHOUT SPONTANEOUS RUPTURE OF TYMPANIC MEMBRANE, RECURRENCE NOT SPECIFIED: ICD-10-CM

## 2021-04-26 PROCEDURE — 3008F PR BODY MASS INDEX (BMI) DOCUMENTED: ICD-10-PCS | Mod: S$GLB,,, | Performed by: PHYSICIAN ASSISTANT

## 2021-04-26 PROCEDURE — 69209 REMOVE IMPACTED EAR WAX UNI: CPT | Mod: 50,S$GLB,, | Performed by: PHYSICIAN ASSISTANT

## 2021-04-26 PROCEDURE — 99213 PR OFFICE/OUTPT VISIT, EST, LEVL III, 20-29 MIN: ICD-10-PCS | Mod: 25,S$GLB,, | Performed by: PHYSICIAN ASSISTANT

## 2021-04-26 PROCEDURE — 69209 EAR CERUMEN REMOVAL: ICD-10-PCS | Mod: 50,S$GLB,, | Performed by: PHYSICIAN ASSISTANT

## 2021-04-26 PROCEDURE — 99213 OFFICE O/P EST LOW 20 MIN: CPT | Mod: 25,S$GLB,, | Performed by: PHYSICIAN ASSISTANT

## 2021-04-26 PROCEDURE — 3008F BODY MASS INDEX DOCD: CPT | Mod: S$GLB,,, | Performed by: PHYSICIAN ASSISTANT

## 2021-04-26 RX ORDER — AMOXICILLIN AND CLAVULANATE POTASSIUM 875; 125 MG/1; MG/1
1 TABLET, FILM COATED ORAL EVERY 12 HOURS
Qty: 20 TABLET | Refills: 0 | Status: SHIPPED | OUTPATIENT
Start: 2021-04-26 | End: 2021-05-06

## 2021-04-29 ENCOUNTER — PATIENT MESSAGE (OUTPATIENT)
Dept: FAMILY MEDICINE | Facility: CLINIC | Age: 61
End: 2021-04-29

## 2021-05-11 ENCOUNTER — LAB VISIT (OUTPATIENT)
Dept: LAB | Facility: HOSPITAL | Age: 61
End: 2021-05-11
Attending: PHYSICIAN ASSISTANT
Payer: COMMERCIAL

## 2021-05-11 DIAGNOSIS — Z17.1 MALIGNANT NEOPLASM OF CENTRAL PORTION OF LEFT BREAST IN FEMALE, ESTROGEN RECEPTOR NEGATIVE: ICD-10-CM

## 2021-05-11 DIAGNOSIS — C50.112 MALIGNANT NEOPLASM OF CENTRAL PORTION OF LEFT BREAST IN FEMALE, ESTROGEN RECEPTOR NEGATIVE: ICD-10-CM

## 2021-05-11 LAB
ALBUMIN SERPL BCP-MCNC: 4.5 G/DL (ref 3.5–5.2)
ALP SERPL-CCNC: 54 U/L (ref 55–135)
ALT SERPL W/O P-5'-P-CCNC: 28 U/L (ref 10–44)
ANION GAP SERPL CALC-SCNC: 9 MMOL/L (ref 8–16)
AST SERPL-CCNC: 27 U/L (ref 10–40)
BASOPHILS # BLD AUTO: 0.04 K/UL (ref 0–0.2)
BASOPHILS NFR BLD: 0.7 % (ref 0–1.9)
BILIRUB SERPL-MCNC: 0.7 MG/DL (ref 0.1–1)
BUN SERPL-MCNC: 10 MG/DL (ref 8–23)
CALCIUM SERPL-MCNC: 9.3 MG/DL (ref 8.7–10.5)
CHLORIDE SERPL-SCNC: 102 MMOL/L (ref 95–110)
CO2 SERPL-SCNC: 29 MMOL/L (ref 23–29)
CREAT SERPL-MCNC: 0.6 MG/DL (ref 0.5–1.4)
DIFFERENTIAL METHOD: ABNORMAL
EOSINOPHIL # BLD AUTO: 0 K/UL (ref 0–0.5)
EOSINOPHIL NFR BLD: 0.7 % (ref 0–8)
ERYTHROCYTE [DISTWIDTH] IN BLOOD BY AUTOMATED COUNT: 13.2 % (ref 11.5–14.5)
EST. GFR  (AFRICAN AMERICAN): >60 ML/MIN/1.73 M^2
EST. GFR  (NON AFRICAN AMERICAN): >60 ML/MIN/1.73 M^2
GLUCOSE SERPL-MCNC: 103 MG/DL (ref 70–110)
HCT VFR BLD AUTO: 39.8 % (ref 37–48.5)
HGB BLD-MCNC: 12.8 G/DL (ref 12–16)
IMM GRANULOCYTES # BLD AUTO: 0.02 K/UL (ref 0–0.04)
IMM GRANULOCYTES NFR BLD AUTO: 0.3 % (ref 0–0.5)
LYMPHOCYTES # BLD AUTO: 1.4 K/UL (ref 1–4.8)
LYMPHOCYTES NFR BLD: 23.1 % (ref 18–48)
MCH RBC QN AUTO: 30.3 PG (ref 27–31)
MCHC RBC AUTO-ENTMCNC: 32.2 G/DL (ref 32–36)
MCV RBC AUTO: 94 FL (ref 82–98)
MONOCYTES # BLD AUTO: 0.4 K/UL (ref 0.3–1)
MONOCYTES NFR BLD: 6.8 % (ref 4–15)
NEUTROPHILS # BLD AUTO: 4.1 K/UL (ref 1.8–7.7)
NEUTROPHILS NFR BLD: 68.4 % (ref 38–73)
NRBC BLD-RTO: 0 /100 WBC
PLATELET # BLD AUTO: 264 K/UL (ref 150–450)
PMV BLD AUTO: 8.8 FL (ref 9.2–12.9)
POTASSIUM SERPL-SCNC: 4.2 MMOL/L (ref 3.5–5.1)
PROT SERPL-MCNC: 7.3 G/DL (ref 6–8.4)
RBC # BLD AUTO: 4.22 M/UL (ref 4–5.4)
SODIUM SERPL-SCNC: 140 MMOL/L (ref 136–145)
WBC # BLD AUTO: 5.92 K/UL (ref 3.9–12.7)

## 2021-05-11 PROCEDURE — 80053 COMPREHEN METABOLIC PANEL: CPT | Performed by: PHYSICIAN ASSISTANT

## 2021-05-11 PROCEDURE — 36415 COLL VENOUS BLD VENIPUNCTURE: CPT | Performed by: PHYSICIAN ASSISTANT

## 2021-05-11 PROCEDURE — 85025 COMPLETE CBC W/AUTO DIFF WBC: CPT | Performed by: PHYSICIAN ASSISTANT

## 2021-06-16 DIAGNOSIS — F41.9 ANXIETY: ICD-10-CM

## 2021-06-16 RX ORDER — CITALOPRAM 10 MG/1
10 TABLET ORAL DAILY
Qty: 90 TABLET | Refills: 1 | Status: SHIPPED | OUTPATIENT
Start: 2021-06-16 | End: 2021-06-28 | Stop reason: SDUPTHER

## 2021-06-22 DIAGNOSIS — F51.01 PRIMARY INSOMNIA: ICD-10-CM

## 2021-06-22 RX ORDER — TEMAZEPAM 15 MG/1
15 CAPSULE ORAL NIGHTLY PRN
Qty: 90 CAPSULE | Refills: 1 | Status: SHIPPED | OUTPATIENT
Start: 2021-06-22 | End: 2021-10-19 | Stop reason: SDUPTHER

## 2021-06-28 ENCOUNTER — OFFICE VISIT (OUTPATIENT)
Dept: FAMILY MEDICINE | Facility: CLINIC | Age: 61
End: 2021-06-28
Payer: COMMERCIAL

## 2021-06-28 VITALS
BODY MASS INDEX: 17.66 KG/M2 | DIASTOLIC BLOOD PRESSURE: 70 MMHG | HEART RATE: 80 BPM | SYSTOLIC BLOOD PRESSURE: 110 MMHG | HEIGHT: 62 IN | WEIGHT: 96 LBS

## 2021-06-28 DIAGNOSIS — C50.112 MALIGNANT NEOPLASM OF CENTRAL PORTION OF LEFT BREAST IN FEMALE, ESTROGEN RECEPTOR NEGATIVE: ICD-10-CM

## 2021-06-28 DIAGNOSIS — H61.23 BILATERAL IMPACTED CERUMEN: Primary | ICD-10-CM

## 2021-06-28 DIAGNOSIS — Z17.1 MALIGNANT NEOPLASM OF CENTRAL PORTION OF LEFT BREAST IN FEMALE, ESTROGEN RECEPTOR NEGATIVE: ICD-10-CM

## 2021-06-28 DIAGNOSIS — F41.9 ANXIETY: ICD-10-CM

## 2021-06-28 PROCEDURE — 99396 PREV VISIT EST AGE 40-64: CPT | Mod: S$GLB,,, | Performed by: PHYSICIAN ASSISTANT

## 2021-06-28 PROCEDURE — 99396 PR PREVENTIVE VISIT,EST,40-64: ICD-10-PCS | Mod: S$GLB,,, | Performed by: PHYSICIAN ASSISTANT

## 2021-06-28 PROCEDURE — 3008F BODY MASS INDEX DOCD: CPT | Mod: S$GLB,,, | Performed by: PHYSICIAN ASSISTANT

## 2021-06-28 PROCEDURE — 3008F PR BODY MASS INDEX (BMI) DOCUMENTED: ICD-10-PCS | Mod: S$GLB,,, | Performed by: PHYSICIAN ASSISTANT

## 2021-06-28 RX ORDER — CITALOPRAM 10 MG/1
10 TABLET ORAL DAILY
Qty: 90 TABLET | Refills: 1 | Status: SHIPPED | OUTPATIENT
Start: 2021-06-28 | End: 2022-01-18 | Stop reason: SDUPTHER

## 2021-07-21 DIAGNOSIS — F41.9 ANXIETY: ICD-10-CM

## 2021-07-21 RX ORDER — ALPRAZOLAM 0.5 MG/1
0.5 TABLET ORAL 2 TIMES DAILY PRN
Qty: 90 TABLET | Refills: 1 | Status: CANCELLED | OUTPATIENT
Start: 2021-07-21 | End: 2022-01-17

## 2021-09-13 ENCOUNTER — TELEPHONE (OUTPATIENT)
Dept: FAMILY MEDICINE | Facility: CLINIC | Age: 61
End: 2021-09-13

## 2021-09-13 ENCOUNTER — OFFICE VISIT (OUTPATIENT)
Dept: FAMILY MEDICINE | Facility: CLINIC | Age: 61
End: 2021-09-13
Payer: COMMERCIAL

## 2021-09-13 ENCOUNTER — HOSPITAL ENCOUNTER (OUTPATIENT)
Dept: RADIOLOGY | Facility: HOSPITAL | Age: 61
Discharge: HOME | End: 2021-09-13
Attending: NURSE PRACTITIONER
Payer: COMMERCIAL

## 2021-09-13 VITALS
SYSTOLIC BLOOD PRESSURE: 98 MMHG | WEIGHT: 98 LBS | HEART RATE: 76 BPM | TEMPERATURE: 99 F | BODY MASS INDEX: 18.03 KG/M2 | HEIGHT: 62 IN | DIASTOLIC BLOOD PRESSURE: 68 MMHG | OXYGEN SATURATION: 97 %

## 2021-09-13 DIAGNOSIS — R53.83 FATIGUE, UNSPECIFIED TYPE: ICD-10-CM

## 2021-09-13 DIAGNOSIS — J32.9 SINUSITIS, UNSPECIFIED CHRONICITY, UNSPECIFIED LOCATION: ICD-10-CM

## 2021-09-13 DIAGNOSIS — R06.02 SOB (SHORTNESS OF BREATH): ICD-10-CM

## 2021-09-13 DIAGNOSIS — R09.81 NASAL CONGESTION: ICD-10-CM

## 2021-09-13 DIAGNOSIS — Z85.3 HISTORY OF BREAST CANCER: ICD-10-CM

## 2021-09-13 DIAGNOSIS — R09.81 NASAL CONGESTION: Primary | ICD-10-CM

## 2021-09-13 LAB
CTP QC/QA: YES
SARS-COV-2 RDRP RESP QL NAA+PROBE: NEGATIVE

## 2021-09-13 PROCEDURE — 71046 X-RAY EXAM CHEST 2 VIEWS: CPT | Mod: TC,PO

## 2021-09-13 PROCEDURE — 1160F PR REVIEW ALL MEDS BY PRESCRIBER/CLIN PHARMACIST DOCUMENTED: ICD-10-PCS | Mod: S$GLB,,, | Performed by: NURSE PRACTITIONER

## 2021-09-13 PROCEDURE — U0002: ICD-10-PCS | Mod: QW,S$GLB,, | Performed by: NURSE PRACTITIONER

## 2021-09-13 PROCEDURE — 99213 OFFICE O/P EST LOW 20 MIN: CPT | Mod: S$GLB,,, | Performed by: NURSE PRACTITIONER

## 2021-09-13 PROCEDURE — 3008F PR BODY MASS INDEX (BMI) DOCUMENTED: ICD-10-PCS | Mod: S$GLB,,, | Performed by: NURSE PRACTITIONER

## 2021-09-13 PROCEDURE — U0002 COVID-19 LAB TEST NON-CDC: HCPCS | Mod: QW,S$GLB,, | Performed by: NURSE PRACTITIONER

## 2021-09-13 PROCEDURE — 3008F BODY MASS INDEX DOCD: CPT | Mod: S$GLB,,, | Performed by: NURSE PRACTITIONER

## 2021-09-13 PROCEDURE — 1160F RVW MEDS BY RX/DR IN RCRD: CPT | Mod: S$GLB,,, | Performed by: NURSE PRACTITIONER

## 2021-09-13 PROCEDURE — 99213 PR OFFICE/OUTPT VISIT, EST, LEVL III, 20-29 MIN: ICD-10-PCS | Mod: S$GLB,,, | Performed by: NURSE PRACTITIONER

## 2021-09-13 RX ORDER — AZITHROMYCIN 250 MG/1
TABLET, FILM COATED ORAL
Qty: 6 TABLET | Refills: 0 | Status: SHIPPED | OUTPATIENT
Start: 2021-09-13 | End: 2021-09-18

## 2021-09-13 RX ORDER — AZITHROMYCIN 250 MG/1
TABLET, FILM COATED ORAL
Qty: 6 TABLET | Refills: 0 | Status: SHIPPED | OUTPATIENT
Start: 2021-09-13 | End: 2021-09-13 | Stop reason: SDUPTHER

## 2021-10-08 ENCOUNTER — TELEPHONE (OUTPATIENT)
Dept: FAMILY MEDICINE | Facility: CLINIC | Age: 61
End: 2021-10-08

## 2021-10-13 ENCOUNTER — LAB VISIT (OUTPATIENT)
Dept: LAB | Facility: HOSPITAL | Age: 61
End: 2021-10-13
Attending: PHYSICIAN ASSISTANT
Payer: COMMERCIAL

## 2021-10-13 DIAGNOSIS — C50.112 MALIGNANT NEOPLASM OF CENTRAL PORTION OF LEFT BREAST IN FEMALE, ESTROGEN RECEPTOR NEGATIVE: ICD-10-CM

## 2021-10-13 DIAGNOSIS — Z17.1 MALIGNANT NEOPLASM OF CENTRAL PORTION OF LEFT BREAST IN FEMALE, ESTROGEN RECEPTOR NEGATIVE: ICD-10-CM

## 2021-10-13 LAB
ALBUMIN SERPL BCP-MCNC: 4.3 G/DL (ref 3.5–5.2)
ALP SERPL-CCNC: 57 U/L (ref 55–135)
ALT SERPL W/O P-5'-P-CCNC: 21 U/L (ref 10–44)
ANION GAP SERPL CALC-SCNC: 8 MMOL/L (ref 8–16)
AST SERPL-CCNC: 23 U/L (ref 10–40)
BASOPHILS # BLD AUTO: 0.06 K/UL (ref 0–0.2)
BASOPHILS NFR BLD: 1.2 % (ref 0–1.9)
BILIRUB SERPL-MCNC: 0.6 MG/DL (ref 0.1–1)
BUN SERPL-MCNC: 14 MG/DL (ref 8–23)
CALCIUM SERPL-MCNC: 9.5 MG/DL (ref 8.7–10.5)
CHLORIDE SERPL-SCNC: 106 MMOL/L (ref 95–110)
CO2 SERPL-SCNC: 29 MMOL/L (ref 23–29)
CREAT SERPL-MCNC: 0.5 MG/DL (ref 0.5–1.4)
DIFFERENTIAL METHOD: ABNORMAL
EOSINOPHIL # BLD AUTO: 0.1 K/UL (ref 0–0.5)
EOSINOPHIL NFR BLD: 1.6 % (ref 0–8)
ERYTHROCYTE [DISTWIDTH] IN BLOOD BY AUTOMATED COUNT: 13.1 % (ref 11.5–14.5)
EST. GFR  (AFRICAN AMERICAN): >60 ML/MIN/1.73 M^2
EST. GFR  (NON AFRICAN AMERICAN): >60 ML/MIN/1.73 M^2
GLUCOSE SERPL-MCNC: 103 MG/DL (ref 70–110)
HCT VFR BLD AUTO: 39.1 % (ref 37–48.5)
HGB BLD-MCNC: 12.6 G/DL (ref 12–16)
IMM GRANULOCYTES # BLD AUTO: 0.02 K/UL (ref 0–0.04)
IMM GRANULOCYTES NFR BLD AUTO: 0.4 % (ref 0–0.5)
LYMPHOCYTES # BLD AUTO: 1.1 K/UL (ref 1–4.8)
LYMPHOCYTES NFR BLD: 22.1 % (ref 18–48)
MCH RBC QN AUTO: 30.6 PG (ref 27–31)
MCHC RBC AUTO-ENTMCNC: 32.2 G/DL (ref 32–36)
MCV RBC AUTO: 95 FL (ref 82–98)
MONOCYTES # BLD AUTO: 0.4 K/UL (ref 0.3–1)
MONOCYTES NFR BLD: 7.8 % (ref 4–15)
NEUTROPHILS # BLD AUTO: 3.4 K/UL (ref 1.8–7.7)
NEUTROPHILS NFR BLD: 66.9 % (ref 38–73)
NRBC BLD-RTO: 0 /100 WBC
PLATELET # BLD AUTO: 230 K/UL (ref 150–450)
PMV BLD AUTO: 8.5 FL (ref 9.2–12.9)
POTASSIUM SERPL-SCNC: 4 MMOL/L (ref 3.5–5.1)
PROT SERPL-MCNC: 7.1 G/DL (ref 6–8.4)
RBC # BLD AUTO: 4.12 M/UL (ref 4–5.4)
SODIUM SERPL-SCNC: 143 MMOL/L (ref 136–145)
WBC # BLD AUTO: 5.02 K/UL (ref 3.9–12.7)

## 2021-10-13 PROCEDURE — 80053 COMPREHEN METABOLIC PANEL: CPT | Performed by: PHYSICIAN ASSISTANT

## 2021-10-13 PROCEDURE — 85025 COMPLETE CBC W/AUTO DIFF WBC: CPT | Performed by: PHYSICIAN ASSISTANT

## 2021-10-13 PROCEDURE — 36415 COLL VENOUS BLD VENIPUNCTURE: CPT | Performed by: PHYSICIAN ASSISTANT

## 2021-10-14 DIAGNOSIS — M25.552 LEFT HIP PAIN: Primary | ICD-10-CM

## 2021-10-19 ENCOUNTER — LAB VISIT (OUTPATIENT)
Dept: LAB | Facility: HOSPITAL | Age: 61
End: 2021-10-19
Attending: PHYSICIAN ASSISTANT
Payer: COMMERCIAL

## 2021-10-19 ENCOUNTER — HOSPITAL ENCOUNTER (OUTPATIENT)
Dept: RADIOLOGY | Facility: HOSPITAL | Age: 61
Discharge: HOME | End: 2021-10-19
Attending: PHYSICIAN ASSISTANT
Payer: COMMERCIAL

## 2021-10-19 DIAGNOSIS — M25.552 LEFT HIP PAIN: ICD-10-CM

## 2021-10-19 DIAGNOSIS — F41.9 ANXIETY: ICD-10-CM

## 2021-10-19 DIAGNOSIS — C50.111 BILATERAL MALIGNANT NEOPLASM OF CENTRAL PORTION OF BREAST IN FEMALE: Primary | ICD-10-CM

## 2021-10-19 DIAGNOSIS — C50.112 BILATERAL MALIGNANT NEOPLASM OF CENTRAL PORTION OF BREAST IN FEMALE: Primary | ICD-10-CM

## 2021-10-19 DIAGNOSIS — F51.01 PRIMARY INSOMNIA: ICD-10-CM

## 2021-10-19 LAB
BASOPHILS # BLD AUTO: 0.06 K/UL (ref 0–0.2)
BASOPHILS NFR BLD: 1.1 % (ref 0–1.9)
DIFFERENTIAL METHOD: ABNORMAL
EOSINOPHIL # BLD AUTO: 0 K/UL (ref 0–0.5)
EOSINOPHIL NFR BLD: 0.5 % (ref 0–8)
ERYTHROCYTE [DISTWIDTH] IN BLOOD BY AUTOMATED COUNT: 13.2 % (ref 11.5–14.5)
HCT VFR BLD AUTO: 39.3 % (ref 37–48.5)
HGB BLD-MCNC: 12.7 G/DL (ref 12–16)
IMM GRANULOCYTES # BLD AUTO: 0.01 K/UL (ref 0–0.04)
IMM GRANULOCYTES NFR BLD AUTO: 0.2 % (ref 0–0.5)
LYMPHOCYTES # BLD AUTO: 1.3 K/UL (ref 1–4.8)
LYMPHOCYTES NFR BLD: 22.2 % (ref 18–48)
MCH RBC QN AUTO: 30.5 PG (ref 27–31)
MCHC RBC AUTO-ENTMCNC: 32.3 G/DL (ref 32–36)
MCV RBC AUTO: 95 FL (ref 82–98)
MONOCYTES # BLD AUTO: 0.4 K/UL (ref 0.3–1)
MONOCYTES NFR BLD: 7.1 % (ref 4–15)
NEUTROPHILS # BLD AUTO: 3.9 K/UL (ref 1.8–7.7)
NEUTROPHILS NFR BLD: 68.9 % (ref 38–73)
NRBC BLD-RTO: 0 /100 WBC
PLATELET # BLD AUTO: 277 K/UL (ref 150–450)
PMV BLD AUTO: 8.8 FL (ref 9.2–12.9)
RBC # BLD AUTO: 4.16 M/UL (ref 4–5.4)
WBC # BLD AUTO: 5.62 K/UL (ref 3.9–12.7)

## 2021-10-19 PROCEDURE — 73502 X-RAY EXAM HIP UNI 2-3 VIEWS: CPT | Mod: TC,LT

## 2021-10-19 PROCEDURE — 85025 COMPLETE CBC W/AUTO DIFF WBC: CPT | Performed by: PHYSICIAN ASSISTANT

## 2021-10-19 PROCEDURE — 36415 COLL VENOUS BLD VENIPUNCTURE: CPT | Performed by: PHYSICIAN ASSISTANT

## 2021-10-19 RX ORDER — TEMAZEPAM 15 MG/1
15 CAPSULE ORAL NIGHTLY PRN
Qty: 90 CAPSULE | Refills: 1 | Status: SHIPPED | OUTPATIENT
Start: 2021-10-19 | End: 2022-01-18 | Stop reason: SDUPTHER

## 2021-10-19 RX ORDER — ALPRAZOLAM 0.5 MG/1
0.5 TABLET ORAL 2 TIMES DAILY PRN
Qty: 90 TABLET | Refills: 1 | Status: SHIPPED | OUTPATIENT
Start: 2021-10-19 | End: 2022-01-18 | Stop reason: SDUPTHER

## 2021-11-01 ENCOUNTER — OFFICE VISIT (OUTPATIENT)
Dept: CARDIOLOGY | Facility: CLINIC | Age: 61
End: 2021-11-01
Payer: COMMERCIAL

## 2021-11-01 ENCOUNTER — TELEPHONE (OUTPATIENT)
Dept: CARDIOLOGY | Facility: CLINIC | Age: 61
End: 2021-11-01
Payer: COMMERCIAL

## 2021-11-01 VITALS
OXYGEN SATURATION: 94 % | HEIGHT: 62 IN | BODY MASS INDEX: 17.3 KG/M2 | SYSTOLIC BLOOD PRESSURE: 120 MMHG | WEIGHT: 94 LBS | HEART RATE: 84 BPM | DIASTOLIC BLOOD PRESSURE: 70 MMHG

## 2021-11-01 DIAGNOSIS — Z87.19 HISTORY OF GASTROESOPHAGEAL REFLUX (GERD): ICD-10-CM

## 2021-11-01 DIAGNOSIS — R07.9 CHEST PAIN, UNSPECIFIED TYPE: Primary | ICD-10-CM

## 2021-11-01 PROCEDURE — 3008F PR BODY MASS INDEX (BMI) DOCUMENTED: ICD-10-PCS | Mod: CPTII,S$GLB,, | Performed by: INTERNAL MEDICINE

## 2021-11-01 PROCEDURE — 3074F SYST BP LT 130 MM HG: CPT | Mod: CPTII,S$GLB,, | Performed by: INTERNAL MEDICINE

## 2021-11-01 PROCEDURE — 3074F PR MOST RECENT SYSTOLIC BLOOD PRESSURE < 130 MM HG: ICD-10-PCS | Mod: CPTII,S$GLB,, | Performed by: INTERNAL MEDICINE

## 2021-11-01 PROCEDURE — 3078F DIAST BP <80 MM HG: CPT | Mod: CPTII,S$GLB,, | Performed by: INTERNAL MEDICINE

## 2021-11-01 PROCEDURE — 93000 ELECTROCARDIOGRAM COMPLETE: CPT | Mod: S$GLB,,, | Performed by: INTERNAL MEDICINE

## 2021-11-01 PROCEDURE — 99214 OFFICE O/P EST MOD 30 MIN: CPT | Mod: S$GLB,,, | Performed by: INTERNAL MEDICINE

## 2021-11-01 PROCEDURE — 1160F PR REVIEW ALL MEDS BY PRESCRIBER/CLIN PHARMACIST DOCUMENTED: ICD-10-PCS | Mod: CPTII,S$GLB,, | Performed by: INTERNAL MEDICINE

## 2021-11-01 PROCEDURE — 99214 PR OFFICE/OUTPT VISIT, EST, LEVL IV, 30-39 MIN: ICD-10-PCS | Mod: S$GLB,,, | Performed by: INTERNAL MEDICINE

## 2021-11-01 PROCEDURE — 1160F RVW MEDS BY RX/DR IN RCRD: CPT | Mod: CPTII,S$GLB,, | Performed by: INTERNAL MEDICINE

## 2021-11-01 PROCEDURE — 1159F PR MEDICATION LIST DOCUMENTED IN MEDICAL RECORD: ICD-10-PCS | Mod: CPTII,S$GLB,, | Performed by: INTERNAL MEDICINE

## 2021-11-01 PROCEDURE — 1159F MED LIST DOCD IN RCRD: CPT | Mod: CPTII,S$GLB,, | Performed by: INTERNAL MEDICINE

## 2021-11-01 PROCEDURE — 3078F PR MOST RECENT DIASTOLIC BLOOD PRESSURE < 80 MM HG: ICD-10-PCS | Mod: CPTII,S$GLB,, | Performed by: INTERNAL MEDICINE

## 2021-11-01 PROCEDURE — 3008F BODY MASS INDEX DOCD: CPT | Mod: CPTII,S$GLB,, | Performed by: INTERNAL MEDICINE

## 2021-11-01 PROCEDURE — 93000 EKG 12-LEAD: ICD-10-PCS | Mod: S$GLB,,, | Performed by: INTERNAL MEDICINE

## 2021-11-01 RX ORDER — PANTOPRAZOLE SODIUM 40 MG/1
40 TABLET, DELAYED RELEASE ORAL DAILY
Qty: 30 TABLET | Refills: 11 | Status: SHIPPED | OUTPATIENT
Start: 2021-11-01 | End: 2022-01-18

## 2021-11-04 ENCOUNTER — HOSPITAL ENCOUNTER (OUTPATIENT)
Dept: CARDIOLOGY | Facility: CLINIC | Age: 61
Discharge: HOME | End: 2021-11-04
Attending: INTERNAL MEDICINE
Payer: COMMERCIAL

## 2021-11-04 DIAGNOSIS — Z87.19 HISTORY OF GASTROESOPHAGEAL REFLUX (GERD): ICD-10-CM

## 2021-11-04 DIAGNOSIS — R07.9 CHEST PAIN, UNSPECIFIED TYPE: ICD-10-CM

## 2021-11-04 PROCEDURE — 93015 CV STRESS TEST SUPVJ I&R: CPT | Mod: S$GLB,,, | Performed by: INTERNAL MEDICINE

## 2021-11-04 PROCEDURE — 93015 EXERCISE STRESS - EKG (CUPID ONLY): ICD-10-PCS | Mod: S$GLB,,, | Performed by: INTERNAL MEDICINE

## 2021-11-15 LAB
CV STRESS BASE HR: 83 BPM
DIASTOLIC BLOOD PRESSURE: 60 MMHG
OHS CV CPX 1 MINUTE RECOVERY HEART RATE: 122 BPM
OHS CV CPX 85 PERCENT MAX PREDICTED HEART RATE MALE: 129
OHS CV CPX ESTIMATED METS: 12
OHS CV CPX MAX PREDICTED HEART RATE: 151
OHS CV CPX PATIENT IS FEMALE: 1
OHS CV CPX PATIENT IS MALE: 0
OHS CV CPX PEAK DIASTOLIC BLOOD PRESSURE: 68 MMHG
OHS CV CPX PEAK HEAR RATE: 142 BPM
OHS CV CPX PEAK RATE PRESSURE PRODUCT: NORMAL
OHS CV CPX PEAK SYSTOLIC BLOOD PRESSURE: 138 MMHG
OHS CV CPX PERCENT MAX PREDICTED HEART RATE ACHIEVED: 94
OHS CV CPX RATE PRESSURE PRODUCT PRESENTING: 9296
STRESS ECHO POST EXERCISE DUR MIN: 9 MINUTES
STRESS ECHO POST EXERCISE DUR SEC: 53 SECONDS
SYSTOLIC BLOOD PRESSURE: 112 MMHG

## 2022-01-04 ENCOUNTER — TELEPHONE (OUTPATIENT)
Dept: FAMILY MEDICINE | Facility: CLINIC | Age: 62
End: 2022-01-04
Payer: COMMERCIAL

## 2022-01-04 DIAGNOSIS — Z00.00 ROUTINE GENERAL MEDICAL EXAMINATION AT A HEALTH CARE FACILITY: ICD-10-CM

## 2022-01-04 DIAGNOSIS — Z79.899 ENCOUNTER FOR LONG-TERM (CURRENT) USE OF OTHER MEDICATIONS: Primary | ICD-10-CM

## 2022-01-18 ENCOUNTER — OFFICE VISIT (OUTPATIENT)
Dept: FAMILY MEDICINE | Facility: CLINIC | Age: 62
End: 2022-01-18
Payer: COMMERCIAL

## 2022-01-18 VITALS
OXYGEN SATURATION: 99 % | WEIGHT: 98 LBS | SYSTOLIC BLOOD PRESSURE: 112 MMHG | HEIGHT: 62 IN | BODY MASS INDEX: 18.03 KG/M2 | HEART RATE: 78 BPM | DIASTOLIC BLOOD PRESSURE: 72 MMHG

## 2022-01-18 DIAGNOSIS — F41.9 ANXIETY: ICD-10-CM

## 2022-01-18 DIAGNOSIS — Z79.899 ENCOUNTER FOR LONG-TERM (CURRENT) USE OF OTHER MEDICATIONS: Primary | ICD-10-CM

## 2022-01-18 DIAGNOSIS — F51.01 PRIMARY INSOMNIA: ICD-10-CM

## 2022-01-18 DIAGNOSIS — Z51.81 ENCOUNTER FOR THERAPEUTIC DRUG MONITORING: ICD-10-CM

## 2022-01-18 PROCEDURE — 99396 PR PREVENTIVE VISIT,EST,40-64: ICD-10-PCS | Mod: S$GLB,,, | Performed by: PHYSICIAN ASSISTANT

## 2022-01-18 PROCEDURE — 3008F PR BODY MASS INDEX (BMI) DOCUMENTED: ICD-10-PCS | Mod: S$GLB,,, | Performed by: PHYSICIAN ASSISTANT

## 2022-01-18 PROCEDURE — 3008F BODY MASS INDEX DOCD: CPT | Mod: S$GLB,,, | Performed by: PHYSICIAN ASSISTANT

## 2022-01-18 PROCEDURE — 99396 PREV VISIT EST AGE 40-64: CPT | Mod: S$GLB,,, | Performed by: PHYSICIAN ASSISTANT

## 2022-01-18 RX ORDER — CHOLECALCIFEROL (VITAMIN D3) 25 MCG
1000 TABLET ORAL DAILY
COMMUNITY
End: 2023-01-19

## 2022-01-18 RX ORDER — CITALOPRAM 10 MG/1
10 TABLET ORAL DAILY
Qty: 90 TABLET | Refills: 1 | Status: SHIPPED | OUTPATIENT
Start: 2022-01-18 | End: 2022-07-18 | Stop reason: SDUPTHER

## 2022-01-18 RX ORDER — ACYCLOVIR 400 MG/1
400 TABLET ORAL 2 TIMES DAILY
Qty: 180 TABLET | Refills: 1 | Status: SHIPPED | OUTPATIENT
Start: 2022-01-18 | End: 2023-03-08 | Stop reason: SDUPTHER

## 2022-01-18 RX ORDER — ALPRAZOLAM 0.5 MG/1
0.5 TABLET ORAL 2 TIMES DAILY PRN
Qty: 90 TABLET | Refills: 1 | Status: SHIPPED | OUTPATIENT
Start: 2022-01-18 | End: 2022-04-22 | Stop reason: SDUPTHER

## 2022-01-18 RX ORDER — TEMAZEPAM 15 MG/1
15 CAPSULE ORAL NIGHTLY PRN
Qty: 90 CAPSULE | Refills: 1 | Status: SHIPPED | OUTPATIENT
Start: 2022-01-18 | End: 2022-07-07 | Stop reason: SDUPTHER

## 2022-01-18 NOTE — PATIENT INSTRUCTIONS
"Patient Education       Insomnia   The Basics   Written by the doctors and editors at [facility]   What is insomnia? -- Insomnia is a problem with sleep. People with insomnia have trouble falling or staying asleep, or they do not feel rested when they wake up. Insomnia is not about the number of hours of sleep a person gets. Everyone needs a different amount of sleep.  Short-term insomnia is when a person has trouble sleeping for a few days or weeks. This is usually related to temporary stress and often gets better on its own. Long-term or "chronic" insomnia is when sleep problems last for 3 months or longer.   What are the symptoms of insomnia? -- People with insomnia often:  · Have trouble falling or staying asleep  · Feel tired during the day  · Forget things or have trouble thinking clearly  · Get cranky, anxious, irritable, or depressed  · Have less energy or interest in doing things  · Make mistakes or get into accidents more often than normal  · Worry about their lack of sleep  These symptoms can be so bad that they affect a person's relationships or work life. They can happen even in people who seem to be sleeping enough hours.  Are there tests I should have? -- Probably not. Most people with insomnia need no tests. Your doctor or nurse will probably be able to tell what is wrong just by talking to you. They might also ask you to keep a daily log for 1 to 2 weeks, where you keep track of how you sleep each night.  In some cases, people do need special sleep tests, such as "polysomnography" or "actigraphy."  · Polysomnography - Polysomnography is a test that usually lasts all night. It can be done in a sleep lab or in your home. During the test, monitors are attached to your body to record movement, brain activity, breathing, and other body functions.  · Actigraphy - Actigraphy records activity and movement with a monitor or motion detector that is usually worn on the wrist. The test is done at home, over " "several days and nights. It will record how much you actually sleep and when.  Should I see a doctor or nurse? -- Yes. If you have insomnia, and it is troubling you, see your doctor or nurse. They might have suggestions on how to treat the problem.  How is insomnia treated? -- It depends. If your insomnia is related to stress, pain, or a medical problem, treating that problem can help you sleep better. If you have chronic insomnia, meaning insomnia that lasts longer than 3 months, there are specific treatments that can help. They include:  · Cognitive behavioral therapy - Cognitive behavioral therapy for insomnia, or "CBT-I," involves working with a counselor or therapist over several weeks. You will work on understanding your insomnia, learning ways to build better sleep habits, and changing negative thinking patterns that can make insomnia worse. Your therapist can also teach you relaxation exercises that can help.  Part of CBT-I involves learning about "sleep hygiene." These things can also be helpful for people who don't have chronic insomnia but have trouble sleeping sometimes. Having good sleep hygiene means you:  ? Sleep only long enough to feel rested and then get out of bed  ? Go to bed and get up at the same time every day  ? Do not try to force yourself to sleep. If you can't sleep, get out of bed and try again later.  ? Have coffee, tea, and other foods that have caffeine only in the morning  ? Avoid alcohol in the late afternoon, evening, and bedtime  ? Avoid smoking, especially in the evening  ? Keep your bedroom dark, cool, quiet, and free of reminders of work or other things that cause you stress  ? Solve problems you have before you go to bed  ? Get plenty of physical activity, but avoid heavy exercise right before bed  ? Avoid looking at phones, computer screens, or reading devices ("e-books") that give off light before bed. This can make it harder to fall asleep.  · Medicines - There are also " "medicines that can help with sleep. But doctors usually recommend trying cognitive behavioral therapy first. In some cases, they might recommend starting both at the same time. If your doctor or nurse thinks medicine might help you, they will talk to you about the benefits and risks. Doctors generally do not recommend over-the-counter "sleep aids" for treating chronic insomnia.  If your insomnia is related to problems like depression or anxiety, it can help to treat those problems directly.  Can I use alcohol to help me sleep? -- No, do not use alcohol as a sleep aid. Even though alcohol makes you sleepy at first, it disrupts sleep later in the night.  All topics are updated as new evidence becomes available and our peer review process is complete.  This topic retrieved from [employer] on: Sep 21, 2021.  Topic 61586 Version 12.0  Release: 29.4.2 - C29.263  © 2021 UpToDate, Inc. and/or its affiliates. All rights reserved.  table 1: Consensus sleep diary instructions  General instructions    What is a sleep diary?   A sleep diary is designed to gather information about your daily sleep pattern.   How often and when do I fill out the sleep diary?   It is necessary for you to complete your sleep diary every day. If possible, the sleep diary should be completed within one hour of getting out of bed in the morning.   What should I do if I miss a day?   If you forget to fill in the diary or are unable to finish it, leave the diary blank for that day.   What if something unusual affects my sleep or how I feel in the daytime?   If your sleep or daytime functioning is affected by some unusual event (such as an illness, or an emergency) you may make brief notes on your diary.   What do the words "bed" and "day" mean on the diary?   This diary can be used for people who are awake or asleep at unusual times. In the sleep diary, the word "day" is the time when you choose or are required to be awake. The term "bed" means the place " "where you usually sleep.   Will answering these questions about my sleep keep me awake?   This is not usually a problem. You should not worry about giving exact times, and you should not watch the clock. Just give your best estimate.   Sleep diary item instructions    Use the guide below to clarify what is being asked for each item of the sleep diary.   Date: Write the date of the morning you are filling out the diary.   1. What time did you get into bed?   Write the time that you got into bed. This may not be the time you began "trying" to fall asleep.   2. What time did you try to go to sleep?   Record the time that you began "trying" to fall asleep.   3. How long did it take you to fall asleep?   Beginning at the time you wrote in question 2, how long did it take you to fall asleep?   4. How many times did you wake up, not counting your final awakening?   How many times did you wake up between the time you first fell asleep and your final awakening?   5. In total, how long did these awakenings last?   What was the total time you were awake between the time you first fell asleep and your final awakening? For example, if you woke 3 times for 20 minutes, 35 minutes, and 15 minutes, add them all up (20 + 35 + 15 = 70 min or 1 hr and 10 min).   6a. What time was your final awakening?   Record the last time you woke up in the morning.   6b. After your final awakening, how long did you spend in bed trying to sleep?   After the last time you woke-up (item #6a), how many minutes did you spend in bed trying to sleep? For example, if you woke up at 8 am but continued to try and sleep until 9 am, record 1 hour.   6c. Did you wake up earlier than you planned?   If you woke up or were awakened earlier than you planned, check yes. If you woke up at your planned time, check no.   6d. If yes, how much earlier?   If you answered "yes" to question 6c, write the number of minutes you woke up earlier than you had planned on waking " "up. For example, if you woke up 15 minutes before the alarm went off, record 15 minutes here.   7. What time did you get out of bed for the day?   What time did you get out of bed with no further attempt at sleeping? This may be different from your final awakening time (eg, you may have woken up at 6:35 am but did not get out of bed to start your day until 7:20 am).   8. In total, how long did you sleep?   This should just be your best estimate, based on when you went to bed and woke up, how long it took you to fall asleep, and how long you were awake. You do not need to calculate this by adding and subtracting; just give your best estimate.   9. How would you rate the quality of your sleep?   "Sleep quality" is your sense of whether your sleep was good or poor.   10. How restful or refreshed did you feel when you woke up for the day?   This refers to how you felt after you were done sleeping for the night, during the first few minutes that you were awake.   11a. How many times did you nap or doze?   A nap is a time you decided to sleep during the day, whether in bed or not in bed. "Dozing" is a time you may have nodded off for a few minutes, without meaning to, such as while watching TV. Count all the times you napped or dozed at any time from when you first got out of bed in the morning until you got into bed again at night.   11b. In total, how long did you nap or doze?   Estimate the total amount of time you spent napping or dozing, in hours and minutes. For instance, if you napped twice, once for 30 minutes and once for 60 minutes, and dozed for 10 minutes, you would answer "1 hour 40 minutes." If you did not nap or doze, write "N/A" (not applicable).   12a. How many drinks containing alcohol did you have?   Enter the number of alcoholic drinks you had where 1 drink is defined as one 12 oz beer (can), 5 oz wine, or 1.5 oz liquor (one shot).   12b. What time was your last drink?   If you had an alcoholic drink " "yesterday, enter the time of day in hours and minutes of your last drink. If you did not have a drink, write "N/A" (not applicable).   13a. How many caffeinated drinks (coffee, tea, soda, energy drinks) did you have?   Enter the number of caffeinated drinks (coffee, tea, soda, energy drinks) you had where for coffee and tea, one drink = 6-8 oz; while for caffeinated soda one drink = 12 oz.   13b. What time was your last caffeinated drink?   If you had a caffeinated drink, enter the time of day in hours and minutes of your last drink. If you did not have a caffeinated drink, write "N/A" (not applicable).   14. Did you take any over-the-counter or prescription medication(s) to help you sleep?   If so, list medication(s), dose, and time taken: List the medication name, how much and when you took EACH different medication you took tonight to help you sleep. Include medication available over the counter, prescription medications, and herbals (example: "Sleepwell 50 mg 11 pm"). If every night is the same, write "same" after the first day.   15. Comments:   If you have anything that you would like to say that is relevant to your sleep feel free to write it here.   Graphic 96544 Version 4.0  Consumer Information Use and Disclaimer   This information is not specific medical advice and does not replace information you receive from your health care provider. This is only a brief summary of general information. It does NOT include all information about conditions, illnesses, injuries, tests, procedures, treatments, therapies, discharge instructions or life-style choices that may apply to you. You must talk with your health care provider for complete information about your health and treatment options. This information should not be used to decide whether or not to accept your health care provider's advice, instructions or recommendations. Only your health care provider has the knowledge and training to provide advice that is " right for you. The use of this information is governed by the [employer] User License Agreement, available at https://www.[employer].Modusly/en/solutions/SocialTagg/about/demetri.The use of [employer] content is governed by the [employer] Terms of Use. ©2021 UpToDate, Inc. All rights reserved.  Copyright   © 2021 UpToDate, Inc. and/or its affiliates. All rights reserved.

## 2022-01-18 NOTE — PROGRESS NOTES
SUBJECTIVE:    Patient ID: Hollie Tsang is a 61 y.o. female.    Chief Complaint: Annual Exam (Annual wellness visit//no med bottles//declined flu vac//tc)    Patient is a 62 yo female who presents today for routine follow-up. Today, she is doing well overall. She is experiencing some fatigue after receiving the covid booster.   She completed her labs today. Still following with oncologist every 6 months, Dr. Singh. She recently noted a small, raised bump located in right inguinal region. Not painful or red. Still feels like there may be a slight impaction in her left ear. Doesn't use debrox as often as she'd like.       Hospital Outpatient Visit on 11/04/2021   Component Date Value Ref Range Status    85% Max Predicted HR 11/04/2021 129   Final    Max Predicted HR 11/04/2021 151   Final    OHS CV CPX PATIENT IS MALE 11/04/2021 0.0   Final    OHS CV CPX PATIENT IS FEMALE 11/04/2021 1.0   Final    HR at rest 11/04/2021 83  bpm Final    Systolic blood pressure 11/04/2021 112  mmHg Final    Diastolic blood pressure 11/04/2021 60  mmHg Final    RPP 11/04/2021 9,296   Final    Exercise duration (min) 11/04/2021 9  minutes Final    Exercise duration (sec) 11/04/2021 53  seconds Final    Peak HR 11/04/2021 142  bpm Final    Peak Systolic BP 11/04/2021 138  mmHg Final    Peak Diatolic BP 11/04/2021 68  mmHg Final    Peak RPP 11/04/2021 19,596   Final    Estimated METs 11/04/2021 12   Final    % Max HR Achieved 11/04/2021 94   Final    1 Minute Recovery HR 11/04/2021 122  bpm Final   Lab Visit on 10/19/2021   Component Date Value Ref Range Status    WBC 10/19/2021 5.62  3.90 - 12.70 K/uL Final    RBC 10/19/2021 4.16  4.00 - 5.40 M/uL Final    Hemoglobin 10/19/2021 12.7  12.0 - 16.0 g/dL Final    Hematocrit 10/19/2021 39.3  37.0 - 48.5 % Final    MCV 10/19/2021 95  82 - 98 fL Final    MCH 10/19/2021 30.5  27.0 - 31.0 pg Final    MCHC 10/19/2021 32.3  32.0 - 36.0 g/dL Final    RDW 10/19/2021 13.2  11.5  - 14.5 % Final    Platelets 10/19/2021 277  150 - 450 K/uL Final    MPV 10/19/2021 8.8* 9.2 - 12.9 fL Final    Immature Granulocytes 10/19/2021 0.2  0.0 - 0.5 % Final    Gran # (ANC) 10/19/2021 3.9  1.8 - 7.7 K/uL Final    Immature Grans (Abs) 10/19/2021 0.01  0.00 - 0.04 K/uL Final    Lymph # 10/19/2021 1.3  1.0 - 4.8 K/uL Final    Mono # 10/19/2021 0.4  0.3 - 1.0 K/uL Final    Eos # 10/19/2021 0.0  0.0 - 0.5 K/uL Final    Baso # 10/19/2021 0.06  0.00 - 0.20 K/uL Final    nRBC 10/19/2021 0  0 /100 WBC Final    Gran % 10/19/2021 68.9  38.0 - 73.0 % Final    Lymph % 10/19/2021 22.2  18.0 - 48.0 % Final    Mono % 10/19/2021 7.1  4.0 - 15.0 % Final    Eosinophil % 10/19/2021 0.5  0.0 - 8.0 % Final    Basophil % 10/19/2021 1.1  0.0 - 1.9 % Final    Differential Method 10/19/2021 Automated   Final   Lab Visit on 10/13/2021   Component Date Value Ref Range Status    WBC 10/13/2021 5.02  3.90 - 12.70 K/uL Final    RBC 10/13/2021 4.12  4.00 - 5.40 M/uL Final    Hemoglobin 10/13/2021 12.6  12.0 - 16.0 g/dL Final    Hematocrit 10/13/2021 39.1  37.0 - 48.5 % Final    MCV 10/13/2021 95  82 - 98 fL Final    MCH 10/13/2021 30.6  27.0 - 31.0 pg Final    MCHC 10/13/2021 32.2  32.0 - 36.0 g/dL Final    RDW 10/13/2021 13.1  11.5 - 14.5 % Final    Platelets 10/13/2021 230  150 - 450 K/uL Final    MPV 10/13/2021 8.5* 9.2 - 12.9 fL Final    Immature Granulocytes 10/13/2021 0.4  0.0 - 0.5 % Final    Gran # (ANC) 10/13/2021 3.4  1.8 - 7.7 K/uL Final    Immature Grans (Abs) 10/13/2021 0.02  0.00 - 0.04 K/uL Final    Lymph # 10/13/2021 1.1  1.0 - 4.8 K/uL Final    Mono # 10/13/2021 0.4  0.3 - 1.0 K/uL Final    Eos # 10/13/2021 0.1  0.0 - 0.5 K/uL Final    Baso # 10/13/2021 0.06  0.00 - 0.20 K/uL Final    nRBC 10/13/2021 0  0 /100 WBC Final    Gran % 10/13/2021 66.9  38.0 - 73.0 % Final    Lymph % 10/13/2021 22.1  18.0 - 48.0 % Final    Mono % 10/13/2021 7.8  4.0 - 15.0 % Final    Eosinophil % 10/13/2021  1.6  0.0 - 8.0 % Final    Basophil % 10/13/2021 1.2  0.0 - 1.9 % Final    Differential Method 10/13/2021 Automated   Final    Sodium 10/13/2021 143  136 - 145 mmol/L Final    Potassium 10/13/2021 4.0  3.5 - 5.1 mmol/L Final    Chloride 10/13/2021 106  95 - 110 mmol/L Final    CO2 10/13/2021 29  23 - 29 mmol/L Final    Glucose 10/13/2021 103  70 - 110 mg/dL Final    BUN 10/13/2021 14  8 - 23 mg/dL Final    Creatinine 10/13/2021 0.5  0.5 - 1.4 mg/dL Final    Calcium 10/13/2021 9.5  8.7 - 10.5 mg/dL Final    Total Protein 10/13/2021 7.1  6.0 - 8.4 g/dL Final    Albumin 10/13/2021 4.3  3.5 - 5.2 g/dL Final    Total Bilirubin 10/13/2021 0.6  0.1 - 1.0 mg/dL Final    Alkaline Phosphatase 10/13/2021 57  55 - 135 U/L Final    AST 10/13/2021 23  10 - 40 U/L Final    ALT 10/13/2021 21  10 - 44 U/L Final    Anion Gap 10/13/2021 8  8 - 16 mmol/L Final    eGFR if African American 10/13/2021 >60.0  >60 mL/min/1.73 m^2 Final    eGFR if non African American 10/13/2021 >60.0  >60 mL/min/1.73 m^2 Final   Office Visit on 09/13/2021   Component Date Value Ref Range Status    POC Rapid COVID 09/13/2021 Negative  Negative Final     Acceptable 09/13/2021 Yes   Final       Past Medical History:   Diagnosis Date    Anxiety     Breast cancer     Cancer     breast    Depression     Encounter for blood transfusion     History of gastroesophageal reflux (GERD)     Hx pulmonary embolism     Meningitis     Osteoporosis     PONV (postoperative nausea and vomiting)     PVC's (premature ventricular contractions)      Past Surgical History:   Procedure Laterality Date    AUGMENTATION OF BREAST      BREAST BIOPSY      BREAST RECONSTRUCTION      CHOLECYSTECTOMY      COLONOSCOPY  2009    HYSTERECTOMY      INSERTION OF BREAST IMPLANT Bilateral 11/5/2018    Procedure: INSERTION, BREAST IMPLANT;  Surgeon: Nedra Burden MD;  Location: [facility];  Service: Plastics;  Laterality: Bilateral;     MASTECTOMY Bilateral     reconstruction with tissue expanders    TISSUE EXPANDER REMOVAL Bilateral 11/5/2018    Procedure: REMOVAL, TISSUE EXPANDER;  Surgeon: Nedra Burden MD;  [facility];  Service: Plastics;  Laterality: Bilateral;    TUBAL LIGATION       Family History   Problem Relation Age of Onset    Cancer Mother         breast    Heart disease Mother     Diabetes Mother     Heart disease Father     Stroke Father     Heart failure Maternal Aunt     Heart failure Maternal Uncle        Marital Status:   Alcohol History:  reports no history of alcohol use.  Tobacco History:  reports that she has never smoked. She has never used smokeless tobacco.  Drug History:  reports no history of drug use.    Review of patient's allergies indicates:   Allergen Reactions    Adhesive Rash    Fosamax [alendronate]      Bone erosion in jaw    Meperidine        Current Outpatient Medications:     acyclovir (ZOVIRAX) 400 MG tablet, Take 1 tablet (400 mg total) by mouth 2 (two) times daily., Disp: 180 tablet, Rfl: 1    ALPRAZolam (XANAX) 0.5 MG tablet, Take 1 tablet (0.5 mg total) by mouth 2 (two) times daily as needed for Anxiety., Disp: 90 tablet, Rfl: 1    biotin 1 mg tablet, Take 1,000 mcg by mouth 3 (three) times daily., Disp: , Rfl:     citalopram (CELEXA) 10 MG tablet, Take 1 tablet (10 mg total) by mouth once daily., Disp: 90 tablet, Rfl: 1    ghoodptob-V2-waW58-algal oil (METANX/FOLTANX RF) 3 mg-35 mg-2 mg -90.314 mg Cap, Take 1 tablet by mouth once daily., Disp: 30 capsule, Rfl: 6    multivitamin capsule, Take 1 capsule by mouth once daily., Disp: , Rfl:     pantoprazole (PROTONIX) 40 MG tablet, Take 1 tablet (40 mg total) by mouth once daily., Disp: 30 tablet, Rfl: 11    temazepam (RESTORIL) 15 mg Cap, Take 1 capsule (15 mg total) by mouth nightly as needed., Disp: 90 capsule, Rfl: 1    VITAMIN B COMPLEX (B COMPLEX-VITAMIN B12 ORAL), Take 1 tablet by mouth once daily. , Disp: , Rfl:  "    vitamin D (VITAMIN D3) 1000 units Tab, Take 1,000 Units by mouth once daily., Disp: , Rfl:   No current facility-administered medications for this visit.    Facility-Administered Medications Ordered in Other Visits:     lactated ringers infusion, , Intravenous, Continuous, Charles Cheung MD    lidocaine (PF) 10 mg/ml (1%) injection 2 mg, 0.2 mL, Intradermal, Once, Charles Cheung MD    Review of Systems   Constitutional: Positive for fatigue (Minor, post-booster). Negative for activity change, fever and unexpected weight change.   HENT: Negative for congestion.    Respiratory: Negative for apnea, cough, chest tightness and shortness of breath.    Cardiovascular: Negative for chest pain and palpitations.   Gastrointestinal: Negative for abdominal distention and abdominal pain.   Genitourinary: Negative for difficulty urinating and dyspareunia.   Musculoskeletal: Negative for arthralgias and back pain.   Neurological: Negative for dizziness and weakness.          Objective:      Vitals:    01/18/22 1521   BP: 112/72   Pulse: 78   SpO2: 99%   Weight: 44.5 kg (98 lb)   Height: 5' 2" (1.575 m)     Physical Exam  Constitutional:       General: She is not in acute distress.  HENT:      Head: Normocephalic and atraumatic.   Eyes:      Pupils: Pupils are equal, round, and reactive to light.   Cardiovascular:      Rate and Rhythm: Normal rate and regular rhythm.      Heart sounds: Normal heart sounds.   Pulmonary:      Effort: Pulmonary effort is normal.      Breath sounds: Normal breath sounds.   Abdominal:      General: Bowel sounds are normal. There is no distension.      Palpations: Abdomen is soft.      Tenderness: There is no abdominal tenderness.   Musculoskeletal:         General: Normal range of motion.      Cervical back: Normal range of motion and neck supple.   Skin:     General: Skin is warm and dry.      Findings: No erythema or rash.   Neurological:      Mental Status: She is alert and oriented " to person, place, and time.      Cranial Nerves: No cranial nerve deficit.           Assessment:       1. Encounter for long-term (current) use of other medications    2. Encounter for therapeutic drug monitoring    3. Anxiety    4. Primary insomnia         Plan:       Encounter for long-term (current) use of other medications  Comments:  Completed blood work today. Will f/u with results.  Orders:  -     DRUG MONITOR, PANEL 4, W/CONF, URINE; Future; Expected date: 01/18/2022    Encounter for therapeutic drug monitoring  -     DRUG MONITOR, PANEL 4, W/CONF, URINE; Future; Expected date: 01/18/2022    Anxiety  Comments:  Mood and anxiety stable at this time. Continue as is.  Orders:  -     ALPRAZolam (XANAX) 0.5 MG tablet; Take 1 tablet (0.5 mg total) by mouth 2 (two) times daily as needed for Anxiety.  Dispense: 90 tablet; Refill: 1  -     citalopram (CELEXA) 10 MG tablet; Take 1 tablet (10 mg total) by mouth once daily.  Dispense: 90 tablet; Refill: 1    Primary insomnia  Comments:  refills as needed.  Orders:  -     temazepam (RESTORIL) 15 mg Cap; Take 1 capsule (15 mg total) by mouth nightly as needed.  Dispense: 90 capsule; Refill: 1    Other orders  -     acyclovir (ZOVIRAX) 400 MG tablet; Take 1 tablet (400 mg total) by mouth 2 (two) times daily.  Dispense: 180 tablet; Refill: 1      Follow up in about 6 months (around 7/18/2022).        1/18/2022 Tommie Rojas PA-C

## 2022-01-20 LAB
ALBUMIN SERPL-MCNC: 4.3 G/DL (ref 3.6–5.1)
ALBUMIN/GLOB SERPL: 1.9 (CALC) (ref 1–2.5)
ALP SERPL-CCNC: 61 U/L (ref 37–153)
ALT SERPL-CCNC: 21 U/L (ref 6–29)
APPEARANCE UR: CLEAR
AST SERPL-CCNC: 22 U/L (ref 10–35)
BACTERIA #/AREA URNS HPF: ABNORMAL /HPF
BACTERIA UR CULT: ABNORMAL
BACTERIA UR CULT: ABNORMAL
BILIRUB SERPL-MCNC: 0.5 MG/DL (ref 0.2–1.2)
BILIRUB UR QL STRIP: NEGATIVE
BUN SERPL-MCNC: 14 MG/DL (ref 7–25)
BUN/CREAT SERPL: NORMAL (CALC) (ref 6–22)
CALCIUM SERPL-MCNC: 9.4 MG/DL (ref 8.6–10.4)
CHLORIDE SERPL-SCNC: 102 MMOL/L (ref 98–110)
CHOLEST SERPL-MCNC: 183 MG/DL
CHOLEST/HDLC SERPL: 3.2 (CALC)
CO2 SERPL-SCNC: 30 MMOL/L (ref 20–32)
COLOR UR: YELLOW
CREAT SERPL-MCNC: 0.63 MG/DL (ref 0.5–0.99)
GLOBULIN SER CALC-MCNC: 2.3 G/DL (CALC) (ref 1.9–3.7)
GLUCOSE SERPL-MCNC: 77 MG/DL (ref 65–99)
GLUCOSE UR QL STRIP: NEGATIVE
HDLC SERPL-MCNC: 58 MG/DL
HGB UR QL STRIP: ABNORMAL
HYALINE CASTS #/AREA URNS LPF: ABNORMAL /LPF
KETONES UR QL STRIP: NEGATIVE
LDLC SERPL CALC-MCNC: 109 MG/DL (CALC)
LEUKOCYTE ESTERASE UR QL STRIP: ABNORMAL
NITRITE UR QL STRIP: NEGATIVE
NONHDLC SERPL-MCNC: 125 MG/DL (CALC)
PH UR STRIP: 6 [PH] (ref 5–8)
POTASSIUM SERPL-SCNC: 4 MMOL/L (ref 3.5–5.3)
PROT SERPL-MCNC: 6.6 G/DL (ref 6.1–8.1)
PROT UR QL STRIP: NEGATIVE
RBC #/AREA URNS HPF: ABNORMAL /HPF
SODIUM SERPL-SCNC: 139 MMOL/L (ref 135–146)
SP GR UR STRIP: 1.01 (ref 1–1.03)
SQUAMOUS #/AREA URNS HPF: ABNORMAL /HPF
TRIGL SERPL-MCNC: 71 MG/DL
TSH SERPL-ACNC: 0.5 MIU/L (ref 0.4–4.5)
WBC #/AREA URNS HPF: ABNORMAL /HPF

## 2022-01-21 ENCOUNTER — TELEPHONE (OUTPATIENT)
Dept: FAMILY MEDICINE | Facility: CLINIC | Age: 62
End: 2022-01-21
Payer: COMMERCIAL

## 2022-01-21 NOTE — TELEPHONE ENCOUNTER
----- Message from Tommie Rojas PA-C sent at 1/21/2022  8:11 AM CST -----  All labs look stable except for urine does have some bacterial growth.  If you are having symptoms we will go ahead and treat this.  Please let me know and we can send in Cipro 500 mg twice daily for 5 days

## 2022-02-22 ENCOUNTER — OFFICE VISIT (OUTPATIENT)
Dept: DERMATOLOGY | Facility: CLINIC | Age: 62
End: 2022-02-22
Payer: COMMERCIAL

## 2022-02-22 VITALS — HEIGHT: 62 IN | BODY MASS INDEX: 18.06 KG/M2 | RESPIRATION RATE: 18 BRPM | WEIGHT: 98.13 LBS

## 2022-02-22 DIAGNOSIS — L64.9 ANDROGENETIC ALOPECIA: Primary | ICD-10-CM

## 2022-02-22 PROCEDURE — 1160F PR REVIEW ALL MEDS BY PRESCRIBER/CLIN PHARMACIST DOCUMENTED: ICD-10-PCS | Mod: CPTII,S$GLB,, | Performed by: DERMATOLOGY

## 2022-02-22 PROCEDURE — 1159F PR MEDICATION LIST DOCUMENTED IN MEDICAL RECORD: ICD-10-PCS | Mod: CPTII,S$GLB,, | Performed by: DERMATOLOGY

## 2022-02-22 PROCEDURE — 99204 PR OFFICE/OUTPT VISIT, NEW, LEVL IV, 45-59 MIN: ICD-10-PCS | Mod: S$GLB,,, | Performed by: DERMATOLOGY

## 2022-02-22 PROCEDURE — 1159F MED LIST DOCD IN RCRD: CPT | Mod: CPTII,S$GLB,, | Performed by: DERMATOLOGY

## 2022-02-22 PROCEDURE — 3008F PR BODY MASS INDEX (BMI) DOCUMENTED: ICD-10-PCS | Mod: CPTII,S$GLB,, | Performed by: DERMATOLOGY

## 2022-02-22 PROCEDURE — 99204 OFFICE O/P NEW MOD 45 MIN: CPT | Mod: S$GLB,,, | Performed by: DERMATOLOGY

## 2022-02-22 PROCEDURE — 3008F BODY MASS INDEX DOCD: CPT | Mod: CPTII,S$GLB,, | Performed by: DERMATOLOGY

## 2022-02-22 PROCEDURE — 1160F RVW MEDS BY RX/DR IN RCRD: CPT | Mod: CPTII,S$GLB,, | Performed by: DERMATOLOGY

## 2022-02-22 PROCEDURE — 99999 PR PBB SHADOW E&M-EST. PATIENT-LVL III: ICD-10-PCS | Mod: PBBFAC,,, | Performed by: DERMATOLOGY

## 2022-02-22 PROCEDURE — 99999 PR PBB SHADOW E&M-EST. PATIENT-LVL III: CPT | Mod: PBBFAC,,, | Performed by: DERMATOLOGY

## 2022-02-22 RX ORDER — MINOXIDIL 50 MG/G
AEROSOL, FOAM TOPICAL
Qty: 60 G | Refills: 11 | Status: SHIPPED | OUTPATIENT
Start: 2022-02-22 | End: 2023-01-19

## 2022-02-22 NOTE — Clinical Note
Kevin Singh Your patient Ms Muniz came to see me for treatment of androgenetic alopecia. I would like to ask you whether you would be ok with us starting spironolactone. Our derm literature supports its safety in breast CA survivors (does not increase risk of recurrence) but I always run it by the oncologist. Thank you! Kathy Garcia

## 2022-02-22 NOTE — PROGRESS NOTES
Subjective:       Patient ID:  Hollie Tsang is a 62 y.o. female who presents for   Chief Complaint   Patient presents with    Hair Loss     Patient present for new pt, hair loss.     C/o hair loss to scalp x since 2017, pt had cancer (breast) and double mastectomy and chemo treatments.   Pt states that all hair was loss at that time but has grown back thin. Pt states that use of biotin and shampoos with biotin in it. Pt states denies any recent stress, surgery, or trauma to the area.  Under care of Oncologist Dr Singh    Skin cancer screening:   no Phx of NMSC.  no Fhx of melanoma.      Current Outpatient Medications:     acyclovir (ZOVIRAX) 400 MG tablet, Take 1 tablet (400 mg total) by mouth 2 (two) times daily., Disp: 180 tablet, Rfl: 1    ALPRAZolam (XANAX) 0.5 MG tablet, Take 1 tablet (0.5 mg total) by mouth 2 (two) times daily as needed for Anxiety., Disp: 90 tablet, Rfl: 1    biotin 1 mg tablet, Take 1,000 mcg by mouth 3 (three) times daily., Disp: , Rfl:     citalopram (CELEXA) 10 MG tablet, Take 1 tablet (10 mg total) by mouth once daily., Disp: 90 tablet, Rfl: 1    xrcxrojfu-M4-[id number]-algal oil (METANX/FOLTANX RF) 3 mg-35 mg-2 mg -90.314 mg Cap, Take 1 tablet by mouth once daily., Disp: 30 capsule, Rfl: 6    multivitamin capsule, Take 1 capsule by mouth once daily., Disp: , Rfl:     temazepam (RESTORIL) 15 mg Cap, Take 1 capsule (15 mg total) by mouth nightly as needed., Disp: 90 capsule, Rfl: 1    vitamin D (VITAMIN D3) 1000 units Tab, Take 1,000 Units by mouth once daily., Disp: , Rfl:           Review of Systems   Constitutional: Negative for fever.   HENT: Negative for congestion, rhinorrhea and postnasal drip.    Eyes: Negative for itching.   Skin: Positive for itching, rash and dry skin.   Allergic/Immunologic: Positive for environmental allergies.        Objective:    Physical Exam   Constitutional: She appears well-developed and well-nourished. No distress.   Neurological: She is alert and  oriented to person, place, and time. She is not disoriented.   Psychiatric: She has a normal mood and affect.   Skin:   Areas Examined (abnormalities noted in diagram):   Scalp / Hair Palpated and Inspected              Diagram Legend     Erythematous scaling macule/papule c/w actinic keratosis       Vascular papule c/w angioma      Pigmented verrucoid papule/plaque c/w seborrheic keratosis      Yellow umbilicated papule c/w sebaceous hyperplasia      Irregularly shaped tan macule c/w lentigo     1-2 mm smooth white papules consistent with Milia      Movable subcutaneous cyst with punctum c/w epidermal inclusion cyst      Subcutaneous movable cyst c/w pilar cyst      Firm pink to brown papule c/w dermatofibroma      Pedunculated fleshy papule(s) c/w skin tag(s)      Evenly pigmented macule c/w junctional nevus     Mildly variegated pigmented, slightly irregular-bordered macule c/w mildly atypical nevus      Flesh colored to evenly pigmented papule c/w intradermal nevus       Pink pearly papule/plaque c/w basal cell carcinoma      Erythematous hyperkeratotic cursted plaque c/w SCC      Surgical scar with no sign of skin cancer recurrence      Open and closed comedones      Inflammatory papules and pustules      Verrucoid papule consistent consistent with wart     Erythematous eczematous patches and plaques     Dystrophic onycholytic nail with subungual debris c/w onychomycosis     Umbilicated papule    Erythematous-base heme-crusted tan verrucoid plaque consistent with inflamed seborrheic keratosis     Erythematous Silvery Scaling Plaque c/w Psoriasis     See annotation    TSH 01/2022 0.5 (on patient's phone results, not seen in our system?)    CMP  Sodium   Date Value Ref Range Status   01/18/2022 139 135 - 146 mmol/L Final     Potassium   Date Value Ref Range Status   01/18/2022 4.0 3.5 - 5.3 mmol/L Final     Chloride   Date Value Ref Range Status   01/18/2022 102 98 - 110 mmol/L Final     CO2   Date Value Ref  Range Status   01/18/2022 30 20 - 32 mmol/L Final     Glucose   Date Value Ref Range Status   01/18/2022 77 65 - 99 mg/dL Final     Comment:                   Fasting reference interval          BUN   Date Value Ref Range Status   01/18/2022 14 7 - 25 mg/dL Final     Creatinine   Date Value Ref Range Status   01/18/2022 0.63 0.50 - 0.99 mg/dL Final     Comment:     For patients >49 years of age, the reference limit  for Creatinine is approximately 13% higher for people  identified as -American.          Calcium   Date Value Ref Range Status   01/18/2022 9.4 8.6 - 10.4 mg/dL Final     Total Protein   Date Value Ref Range Status   01/18/2022 6.6 6.1 - 8.1 g/dL Final     Albumin   Date Value Ref Range Status   01/18/2022 4.3 3.6 - 5.1 g/dL Final     Total Bilirubin   Date Value Ref Range Status   01/18/2022 0.5 0.2 - 1.2 mg/dL Final     Alkaline Phosphatase   Date Value Ref Range Status   10/13/2021 57 55 - 135 U/L Final     AST   Date Value Ref Range Status   01/18/2022 22 10 - 35 U/L Final     ALT   Date Value Ref Range Status   01/18/2022 21 6 - 29 U/L Final     Anion Gap   Date Value Ref Range Status   10/13/2021 8 8 - 16 mmol/L Final     eGFR if    Date Value Ref Range Status   01/18/2022 111 > OR = 60 mL/min/1.73m2 Final     eGFR if non    Date Value Ref Range Status   01/18/2022 96 > OR = 60 mL/min/1.73m2 Final       Lab Results   Component Value Date    WBC 5.62 10/19/2021    RBC 4.16 10/19/2021    HGB 12.7 10/19/2021    HCT 39.3 10/19/2021    MCV 95 10/19/2021    MCH 30.5 10/19/2021    MCHC 32.3 10/19/2021    RDW 13.2 10/19/2021     10/19/2021    MPV 8.8 (L) 10/19/2021    GRAN 3.9 10/19/2021    GRAN 68.9 10/19/2021    LYMPH 1.3 10/19/2021    LYMPH 22.2 10/19/2021    MONO 0.4 10/19/2021    MONO 7.1 10/19/2021    EOS 0.0 10/19/2021    BASO 0.06 10/19/2021    EOSINOPHIL 0.5 10/19/2021    BASOPHIL 1.1 10/19/2021         Assessment / Plan:        Androgenetic  alopecia  -     minoxidiL 5 % Foam; Apply half a capful to scalp QAM  Dispense: 60 g; Refill: 11    - instructed patient to apply OTC minoxidil 5%  foam daily. Side effects may include dryness, irritation, and rare ACD.    messaged oncologist Dr Singh for ok to use anti-androgenic meds, spironolactone vs finasteride in setting of breast ca history    Discussed benefits and risks of therapy with spironolactone, including but not limited to breakthrough bleeding, breast tenderness, and elevated potassium levels which may give symptoms of fatigue, palpitations, and nausea. Patient should limit potassium intake - avoid potassium supplements or salt substitutes, limit bananas and citrus fruits. Pregnancy must be avoided while taking spironolactone.    Patient does not wish to consider low dose PO minoxidil, fear of hirsutism    Discussed with patient that there are multiple over the counter hair supplements, few of which have proven efficacy in controlled clinical trials. However, anecdotal reports have indicated a benefit for these vitamins. Review active ingredients, allergies, and proper use on line. The patient can elect to take at his/her discretion.  NUTRAFOL (nutrafol.com)  VIVISCAL ([employer])  UNTANGLED ([employer])    toppik for scalp camouflage (found online)    Derm literature retrospective analysis (over a 2 years periods) supports lack of effect on breast CA recurrence in survivors.  Spironolactone use does not increase the risk of female breast cancer recurrence: A retrospective analysis  Journal of the American Academy of DermatologyVol. 83Issue 4w5737-4297Ypbdyfrch online: May 21, 2020   Paulie abdul al.             Follow up in about 3 months (around 5/22/2022).

## 2022-02-28 ENCOUNTER — TELEPHONE (OUTPATIENT)
Dept: DERMATOLOGY | Facility: CLINIC | Age: 62
End: 2022-02-28
Payer: COMMERCIAL

## 2022-02-28 NOTE — TELEPHONE ENCOUNTER
----- Message from Butch Norris LPN sent at 2/28/2022  3:54 PM CST -----  Pt wants to know if you had spoken with her Oncologist  ----- Message -----  From: Debra Fernandes  Sent: 2/28/2022   9:09 AM CST  To: [facility] Staff    .Type: Needs Medical Advice  Who Called: Pt  Best Call Back Number: [phone]  Additional Information:Pt requesting to speak with a nurse regarding new prescriptions for   Spironolactone and Finasteride. Pt would like to discuss with  if a decision has been made.  Please call to advise  Thanks

## 2022-02-28 NOTE — TELEPHONE ENCOUNTER
No response from Dr Singh yet. Sent 2/22/22. Can you please message his staff to ask whether he checks his cc'ed chart basket?

## 2022-03-03 ENCOUNTER — TELEPHONE (OUTPATIENT)
Dept: DERMATOLOGY | Facility: CLINIC | Age: 62
End: 2022-03-03
Payer: COMMERCIAL

## 2022-03-03 NOTE — TELEPHONE ENCOUNTER
Spoke with patient and let her know that Dr. Garcia has not yet heard back from Dr. Singh's office - once she does she will move forward with a plan.     Patient verbally understood and stated she would call his office as well.

## 2022-03-03 NOTE — TELEPHONE ENCOUNTER
Spoke with patient and let her know that Dr. Garcia has not yet heard back from Dr. Singh's office - once she does she will move forward with a plan.     Patient verbally understood and stated she would call his office as well.     ----- Message from Sherry Roman sent at 3/3/2022 11:19 AM CST -----  Contact: pt  Type: Needs Medical Advice    Who: Called: pt     Best Call Back Number: [phone]    Inquiry/Question: pt is upset she has made several attempts to reach the dr and her oncology dr about 2 different medications that helps with hair growth that Dr Garcia talked about and no one has reached back out to her she would like to know the status of this please call to advise  Thank you~

## 2022-04-07 ENCOUNTER — TELEPHONE (OUTPATIENT)
Dept: FAMILY MEDICINE | Facility: CLINIC | Age: 62
End: 2022-04-07
Payer: COMMERCIAL

## 2022-04-07 DIAGNOSIS — E04.1 THYROID NODULE: Primary | ICD-10-CM

## 2022-04-07 NOTE — TELEPHONE ENCOUNTER
----- Message from [facility], RT sent at 4/20/2021  3:03 PM CDT -----  Regarding: Thyroid u/s due  Tommie Rojas PA-C   4/20/2021  2:48 PM CDT Back to Top    Stable appearance of solid nodules on ultrasound.  A few very small new nodules noted.  We should recheck this again in 1 year.  Let set and alert out

## 2022-04-12 ENCOUNTER — LAB VISIT (OUTPATIENT)
Dept: LAB | Facility: HOSPITAL | Age: 62
End: 2022-04-12
Attending: INTERNAL MEDICINE
Payer: COMMERCIAL

## 2022-04-12 DIAGNOSIS — Z17.1 MALIGNANT NEOPLASM OF CENTRAL PORTION OF LEFT BREAST IN FEMALE, ESTROGEN RECEPTOR NEGATIVE: ICD-10-CM

## 2022-04-12 DIAGNOSIS — C50.112 MALIGNANT NEOPLASM OF CENTRAL PORTION OF LEFT BREAST IN FEMALE, ESTROGEN RECEPTOR NEGATIVE: ICD-10-CM

## 2022-04-12 LAB
ALBUMIN SERPL BCP-MCNC: 4.3 G/DL (ref 3.5–5.2)
ALP SERPL-CCNC: 57 U/L (ref 55–135)
ALT SERPL W/O P-5'-P-CCNC: 23 U/L (ref 10–44)
ANION GAP SERPL CALC-SCNC: 6 MMOL/L (ref 8–16)
AST SERPL-CCNC: 24 U/L (ref 10–40)
BASOPHILS # BLD AUTO: 0.06 K/UL (ref 0–0.2)
BASOPHILS NFR BLD: 1.1 % (ref 0–1.9)
BILIRUB SERPL-MCNC: 0.5 MG/DL (ref 0.1–1)
BUN SERPL-MCNC: 11 MG/DL (ref 8–23)
CALCIUM SERPL-MCNC: 9.1 MG/DL (ref 8.7–10.5)
CHLORIDE SERPL-SCNC: 102 MMOL/L (ref 95–110)
CO2 SERPL-SCNC: 29 MMOL/L (ref 23–29)
CREAT SERPL-MCNC: 0.6 MG/DL (ref 0.5–1.4)
DIFFERENTIAL METHOD: ABNORMAL
EOSINOPHIL # BLD AUTO: 0.1 K/UL (ref 0–0.5)
EOSINOPHIL NFR BLD: 1.4 % (ref 0–8)
ERYTHROCYTE [DISTWIDTH] IN BLOOD BY AUTOMATED COUNT: 13.2 % (ref 11.5–14.5)
EST. GFR  (AFRICAN AMERICAN): >60 ML/MIN/1.73 M^2
EST. GFR  (NON AFRICAN AMERICAN): >60 ML/MIN/1.73 M^2
GLUCOSE SERPL-MCNC: 97 MG/DL (ref 70–110)
HCT VFR BLD AUTO: 40.6 % (ref 37–48.5)
HGB BLD-MCNC: 12.6 G/DL (ref 12–16)
IMM GRANULOCYTES # BLD AUTO: 0.02 K/UL (ref 0–0.04)
IMM GRANULOCYTES NFR BLD AUTO: 0.4 % (ref 0–0.5)
LYMPHOCYTES # BLD AUTO: 1.3 K/UL (ref 1–4.8)
LYMPHOCYTES NFR BLD: 23.8 % (ref 18–48)
MCH RBC QN AUTO: 29.5 PG (ref 27–31)
MCHC RBC AUTO-ENTMCNC: 31 G/DL (ref 32–36)
MCV RBC AUTO: 95 FL (ref 82–98)
MONOCYTES # BLD AUTO: 0.4 K/UL (ref 0.3–1)
MONOCYTES NFR BLD: 7.9 % (ref 4–15)
NEUTROPHILS # BLD AUTO: 3.6 K/UL (ref 1.8–7.7)
NEUTROPHILS NFR BLD: 65.4 % (ref 38–73)
NRBC BLD-RTO: 0 /100 WBC
PLATELET # BLD AUTO: 253 K/UL (ref 150–450)
PMV BLD AUTO: 8.9 FL (ref 9.2–12.9)
POTASSIUM SERPL-SCNC: 3.7 MMOL/L (ref 3.5–5.1)
PROT SERPL-MCNC: 7.2 G/DL (ref 6–8.4)
RBC # BLD AUTO: 4.27 M/UL (ref 4–5.4)
SODIUM SERPL-SCNC: 137 MMOL/L (ref 136–145)
WBC # BLD AUTO: 5.55 K/UL (ref 3.9–12.7)

## 2022-04-12 PROCEDURE — 85025 COMPLETE CBC W/AUTO DIFF WBC: CPT | Performed by: INTERNAL MEDICINE

## 2022-04-12 PROCEDURE — 80053 COMPREHEN METABOLIC PANEL: CPT | Performed by: INTERNAL MEDICINE

## 2022-04-12 PROCEDURE — 36415 COLL VENOUS BLD VENIPUNCTURE: CPT | Performed by: INTERNAL MEDICINE

## 2022-04-13 DIAGNOSIS — M25.552 LEFT HIP PAIN: Primary | ICD-10-CM

## 2022-04-22 DIAGNOSIS — F41.9 ANXIETY: ICD-10-CM

## 2022-04-22 RX ORDER — ALPRAZOLAM 0.5 MG/1
0.5 TABLET ORAL 2 TIMES DAILY PRN
Qty: 90 TABLET | Refills: 1 | Status: SHIPPED | OUTPATIENT
Start: 2022-04-22 | End: 2022-07-07 | Stop reason: SDUPTHER

## 2022-04-28 ENCOUNTER — HOSPITAL ENCOUNTER (OUTPATIENT)
Dept: RADIOLOGY | Facility: HOSPITAL | Age: 62
Discharge: HOME | End: 2022-04-28
Attending: PHYSICIAN ASSISTANT
Payer: COMMERCIAL

## 2022-04-28 DIAGNOSIS — E04.1 THYROID NODULE: ICD-10-CM

## 2022-04-28 PROCEDURE — 76536 US EXAM OF HEAD AND NECK: CPT | Mod: TC,PO

## 2022-05-23 ENCOUNTER — HOSPITAL ENCOUNTER (OUTPATIENT)
Dept: RADIOLOGY | Facility: HOSPITAL | Age: 62
Discharge: HOME | End: 2022-05-23
Attending: INTERNAL MEDICINE
Payer: COMMERCIAL

## 2022-05-23 DIAGNOSIS — M25.552 LEFT HIP PAIN: ICD-10-CM

## 2022-05-23 PROCEDURE — 73721 MRI JNT OF LWR EXTRE W/O DYE: CPT | Mod: TC,LT

## 2022-06-23 ENCOUNTER — LAB VISIT (OUTPATIENT)
Dept: LAB | Facility: HOSPITAL | Age: 62
End: 2022-06-23
Attending: INTERNAL MEDICINE
Payer: COMMERCIAL

## 2022-06-23 DIAGNOSIS — Z17.1 MALIGNANT NEOPLASM OF CENTRAL PORTION OF LEFT BREAST IN FEMALE, ESTROGEN RECEPTOR NEGATIVE: ICD-10-CM

## 2022-06-23 DIAGNOSIS — C50.112 MALIGNANT NEOPLASM OF CENTRAL PORTION OF LEFT BREAST IN FEMALE, ESTROGEN RECEPTOR NEGATIVE: ICD-10-CM

## 2022-06-23 LAB
ALBUMIN SERPL BCP-MCNC: 4.3 G/DL (ref 3.5–5.2)
ALP SERPL-CCNC: 52 U/L (ref 55–135)
ALT SERPL W/O P-5'-P-CCNC: 25 U/L (ref 10–44)
ANION GAP SERPL CALC-SCNC: 5 MMOL/L (ref 8–16)
AST SERPL-CCNC: 26 U/L (ref 10–40)
BASOPHILS # BLD AUTO: 0.05 K/UL (ref 0–0.2)
BASOPHILS NFR BLD: 1 % (ref 0–1.9)
BILIRUB SERPL-MCNC: 0.7 MG/DL (ref 0.1–1)
BUN SERPL-MCNC: 14 MG/DL (ref 8–23)
CALCIUM SERPL-MCNC: 9.2 MG/DL (ref 8.7–10.5)
CHLORIDE SERPL-SCNC: 103 MMOL/L (ref 95–110)
CO2 SERPL-SCNC: 30 MMOL/L (ref 23–29)
CREAT SERPL-MCNC: 0.7 MG/DL (ref 0.5–1.4)
DIFFERENTIAL METHOD: ABNORMAL
EOSINOPHIL # BLD AUTO: 0.1 K/UL (ref 0–0.5)
EOSINOPHIL NFR BLD: 1.2 % (ref 0–8)
ERYTHROCYTE [DISTWIDTH] IN BLOOD BY AUTOMATED COUNT: 13.1 % (ref 11.5–14.5)
EST. GFR  (AFRICAN AMERICAN): >60 ML/MIN/1.73 M^2
EST. GFR  (NON AFRICAN AMERICAN): >60 ML/MIN/1.73 M^2
GLUCOSE SERPL-MCNC: 93 MG/DL (ref 70–110)
HCT VFR BLD AUTO: 39.7 % (ref 37–48.5)
HGB BLD-MCNC: 12.9 G/DL (ref 12–16)
IMM GRANULOCYTES # BLD AUTO: 0.01 K/UL (ref 0–0.04)
IMM GRANULOCYTES NFR BLD AUTO: 0.2 % (ref 0–0.5)
LYMPHOCYTES # BLD AUTO: 1.4 K/UL (ref 1–4.8)
LYMPHOCYTES NFR BLD: 29.1 % (ref 18–48)
MCH RBC QN AUTO: 30.4 PG (ref 27–31)
MCHC RBC AUTO-ENTMCNC: 32.5 G/DL (ref 32–36)
MCV RBC AUTO: 94 FL (ref 82–98)
MONOCYTES # BLD AUTO: 0.4 K/UL (ref 0.3–1)
MONOCYTES NFR BLD: 7.7 % (ref 4–15)
NEUTROPHILS # BLD AUTO: 3 K/UL (ref 1.8–7.7)
NEUTROPHILS NFR BLD: 60.8 % (ref 38–73)
NRBC BLD-RTO: 0 /100 WBC
PLATELET # BLD AUTO: 231 K/UL (ref 150–450)
PMV BLD AUTO: 9 FL (ref 9.2–12.9)
POTASSIUM SERPL-SCNC: 3.7 MMOL/L (ref 3.5–5.1)
PROT SERPL-MCNC: 6.9 G/DL (ref 6–8.4)
RBC # BLD AUTO: 4.24 M/UL (ref 4–5.4)
SODIUM SERPL-SCNC: 138 MMOL/L (ref 136–145)
WBC # BLD AUTO: 4.95 K/UL (ref 3.9–12.7)

## 2022-06-23 PROCEDURE — 85025 COMPLETE CBC W/AUTO DIFF WBC: CPT | Performed by: INTERNAL MEDICINE

## 2022-06-23 PROCEDURE — 36415 COLL VENOUS BLD VENIPUNCTURE: CPT | Performed by: INTERNAL MEDICINE

## 2022-06-23 PROCEDURE — 80053 COMPREHEN METABOLIC PANEL: CPT | Performed by: INTERNAL MEDICINE

## 2022-07-18 ENCOUNTER — OFFICE VISIT (OUTPATIENT)
Dept: FAMILY MEDICINE | Facility: CLINIC | Age: 62
End: 2022-07-18
Payer: COMMERCIAL

## 2022-07-18 VITALS
HEIGHT: 62 IN | SYSTOLIC BLOOD PRESSURE: 100 MMHG | DIASTOLIC BLOOD PRESSURE: 68 MMHG | OXYGEN SATURATION: 99 % | WEIGHT: 99 LBS | BODY MASS INDEX: 18.22 KG/M2 | HEART RATE: 70 BPM

## 2022-07-18 DIAGNOSIS — C50.112 MALIGNANT NEOPLASM OF CENTRAL PORTION OF LEFT BREAST IN FEMALE, ESTROGEN RECEPTOR NEGATIVE: ICD-10-CM

## 2022-07-18 DIAGNOSIS — Z12.31 OTHER SCREENING MAMMOGRAM: Primary | ICD-10-CM

## 2022-07-18 DIAGNOSIS — Z79.899 ENCOUNTER FOR LONG-TERM (CURRENT) USE OF OTHER MEDICATIONS: ICD-10-CM

## 2022-07-18 DIAGNOSIS — Z17.1 MALIGNANT NEOPLASM OF CENTRAL PORTION OF LEFT BREAST IN FEMALE, ESTROGEN RECEPTOR NEGATIVE: ICD-10-CM

## 2022-07-18 DIAGNOSIS — F41.9 ANXIETY: ICD-10-CM

## 2022-07-18 DIAGNOSIS — Z51.81 ENCOUNTER FOR THERAPEUTIC DRUG MONITORING: ICD-10-CM

## 2022-07-18 DIAGNOSIS — F51.01 PRIMARY INSOMNIA: ICD-10-CM

## 2022-07-18 PROCEDURE — 3074F PR MOST RECENT SYSTOLIC BLOOD PRESSURE < 130 MM HG: ICD-10-PCS | Mod: CPTII,S$GLB,, | Performed by: PHYSICIAN ASSISTANT

## 2022-07-18 PROCEDURE — 3078F PR MOST RECENT DIASTOLIC BLOOD PRESSURE < 80 MM HG: ICD-10-PCS | Mod: CPTII,S$GLB,, | Performed by: PHYSICIAN ASSISTANT

## 2022-07-18 PROCEDURE — 99214 PR OFFICE/OUTPT VISIT, EST, LEVL IV, 30-39 MIN: ICD-10-PCS | Mod: S$GLB,,, | Performed by: PHYSICIAN ASSISTANT

## 2022-07-18 PROCEDURE — 1159F MED LIST DOCD IN RCRD: CPT | Mod: CPTII,S$GLB,, | Performed by: PHYSICIAN ASSISTANT

## 2022-07-18 PROCEDURE — 99214 OFFICE O/P EST MOD 30 MIN: CPT | Mod: S$GLB,,, | Performed by: PHYSICIAN ASSISTANT

## 2022-07-18 PROCEDURE — 3074F SYST BP LT 130 MM HG: CPT | Mod: CPTII,S$GLB,, | Performed by: PHYSICIAN ASSISTANT

## 2022-07-18 PROCEDURE — 3008F PR BODY MASS INDEX (BMI) DOCUMENTED: ICD-10-PCS | Mod: CPTII,S$GLB,, | Performed by: PHYSICIAN ASSISTANT

## 2022-07-18 PROCEDURE — 1159F PR MEDICATION LIST DOCUMENTED IN MEDICAL RECORD: ICD-10-PCS | Mod: CPTII,S$GLB,, | Performed by: PHYSICIAN ASSISTANT

## 2022-07-18 PROCEDURE — 3078F DIAST BP <80 MM HG: CPT | Mod: CPTII,S$GLB,, | Performed by: PHYSICIAN ASSISTANT

## 2022-07-18 PROCEDURE — 3008F BODY MASS INDEX DOCD: CPT | Mod: CPTII,S$GLB,, | Performed by: PHYSICIAN ASSISTANT

## 2022-07-18 RX ORDER — TEMAZEPAM 15 MG/1
15 CAPSULE ORAL NIGHTLY PRN
Qty: 90 CAPSULE | Refills: 1 | Status: SHIPPED | OUTPATIENT
Start: 2022-07-18 | End: 2023-01-12 | Stop reason: SDUPTHER

## 2022-07-18 RX ORDER — CITALOPRAM 10 MG/1
10 TABLET ORAL DAILY
Qty: 90 TABLET | Refills: 1 | Status: SHIPPED | OUTPATIENT
Start: 2022-07-18 | End: 2023-01-27 | Stop reason: SDUPTHER

## 2022-07-18 NOTE — PROGRESS NOTES
SUBJECTIVE:    Patient ID: Hollie Tsang is a 62 y.o. female.    Chief Complaint: Annual Exam (Annual wellness exam//no med bottles//mammogram ordered//last dose xanax last night//urine drug screen ordered//tc)    This is a 62-year-old female who presents today for annual checkup.  She continues to follow with Dr. Singh for hx of triple negative breast CA.  recent labs reviewed in all stable.  She is up-to-date on all screening measures.  Patient reports having had PET CT done recently that looked good. She continues to have this dull achey pain in the left hip that has gotten more bothersome for her. Occasional difficulty finding a comfortable position. She has seen Dr. Matthews and has been referred to Dr. Ramirez for pain management.       Lab Visit on 06/23/2022   Component Date Value Ref Range Status    WBC 06/23/2022 4.95  3.90 - 12.70 K/uL Final    RBC 06/23/2022 4.24  4.00 - 5.40 M/uL Final    Hemoglobin 06/23/2022 12.9  12.0 - 16.0 g/dL Final    Hematocrit 06/23/2022 39.7  37.0 - 48.5 % Final    MCV 06/23/2022 94  82 - 98 fL Final    MCH 06/23/2022 30.4  27.0 - 31.0 pg Final    MCHC 06/23/2022 32.5  32.0 - 36.0 g/dL Final    RDW 06/23/2022 13.1  11.5 - 14.5 % Final    Platelets 06/23/2022 231  150 - 450 K/uL Final    MPV 06/23/2022 9.0 (A) 9.2 - 12.9 fL Final    Immature Granulocytes 06/23/2022 0.2  0.0 - 0.5 % Final    Gran # (ANC) 06/23/2022 3.0  1.8 - 7.7 K/uL Final    Immature Grans (Abs) 06/23/2022 0.01  0.00 - 0.04 K/uL Final    Lymph # 06/23/2022 1.4  1.0 - 4.8 K/uL Final    Mono # 06/23/2022 0.4  0.3 - 1.0 K/uL Final    Eos # 06/23/2022 0.1  0.0 - 0.5 K/uL Final    Baso # 06/23/2022 0.05  0.00 - 0.20 K/uL Final    nRBC 06/23/2022 0  0 /100 WBC Final    Gran % 06/23/2022 60.8  38.0 - 73.0 % Final    Lymph % 06/23/2022 29.1  18.0 - 48.0 % Final    Mono % 06/23/2022 7.7  4.0 - 15.0 % Final    Eosinophil % 06/23/2022 1.2  0.0 - 8.0 % Final    Basophil % 06/23/2022 1.0  0.0 - 1.9 % Final     Differential Method 06/23/2022 Automated   Final    Sodium 06/23/2022 138  136 - 145 mmol/L Final    Potassium 06/23/2022 3.7  3.5 - 5.1 mmol/L Final    Chloride 06/23/2022 103  95 - 110 mmol/L Final    CO2 06/23/2022 30 (A) 23 - 29 mmol/L Final    Glucose 06/23/2022 93  70 - 110 mg/dL Final    BUN 06/23/2022 14  8 - 23 mg/dL Final    Creatinine 06/23/2022 0.7  0.5 - 1.4 mg/dL Final    Calcium 06/23/2022 9.2  8.7 - 10.5 mg/dL Final    Total Protein 06/23/2022 6.9  6.0 - 8.4 g/dL Final    Albumin 06/23/2022 4.3  3.5 - 5.2 g/dL Final    Total Bilirubin 06/23/2022 0.7  0.1 - 1.0 mg/dL Final    Alkaline Phosphatase 06/23/2022 52 (A) 55 - 135 U/L Final    AST 06/23/2022 26  10 - 40 U/L Final    ALT 06/23/2022 25  10 - 44 U/L Final    Anion Gap 06/23/2022 5 (A) 8 - 16 mmol/L Final    eGFR if African American 06/23/2022 >60.0  >60 mL/min/1.73 m^2 Final    eGFR if non African American 06/23/2022 >60.0  >60 mL/min/1.73 m^2 Final   Lab Visit on 04/12/2022   Component Date Value Ref Range Status    WBC 04/12/2022 5.55  3.90 - 12.70 K/uL Final    RBC 04/12/2022 4.27  4.00 - 5.40 M/uL Final    Hemoglobin 04/12/2022 12.6  12.0 - 16.0 g/dL Final    Hematocrit 04/12/2022 40.6  37.0 - 48.5 % Final    MCV 04/12/2022 95  82 - 98 fL Final    MCH 04/12/2022 29.5  27.0 - 31.0 pg Final    MCHC 04/12/2022 31.0 (A) 32.0 - 36.0 g/dL Final    RDW 04/12/2022 13.2  11.5 - 14.5 % Final    Platelets 04/12/2022 253  150 - 450 K/uL Final    MPV 04/12/2022 8.9 (A) 9.2 - 12.9 fL Final    Immature Granulocytes 04/12/2022 0.4  0.0 - 0.5 % Final    Gran # (ANC) 04/12/2022 3.6  1.8 - 7.7 K/uL Final    Immature Grans (Abs) 04/12/2022 0.02  0.00 - 0.04 K/uL Final    Lymph # 04/12/2022 1.3  1.0 - 4.8 K/uL Final    Mono # 04/12/2022 0.4  0.3 - 1.0 K/uL Final    Eos # 04/12/2022 0.1  0.0 - 0.5 K/uL Final    Baso # 04/12/2022 0.06  0.00 - 0.20 K/uL Final    nRBC 04/12/2022 0  0 /100 WBC Final    Gran % 04/12/2022 65.4   38.0 - 73.0 % Final    Lymph % 04/12/2022 23.8  18.0 - 48.0 % Final    Mono % 04/12/2022 7.9  4.0 - 15.0 % Final    Eosinophil % 04/12/2022 1.4  0.0 - 8.0 % Final    Basophil % 04/12/2022 1.1  0.0 - 1.9 % Final    Differential Method 04/12/2022 Automated   Final    Sodium 04/12/2022 137  136 - 145 mmol/L Final    Potassium 04/12/2022 3.7  3.5 - 5.1 mmol/L Final    Chloride 04/12/2022 102  95 - 110 mmol/L Final    CO2 04/12/2022 29  23 - 29 mmol/L Final    Glucose 04/12/2022 97  70 - 110 mg/dL Final    BUN 04/12/2022 11  8 - 23 mg/dL Final    Creatinine 04/12/2022 0.6  0.5 - 1.4 mg/dL Final    Calcium 04/12/2022 9.1  8.7 - 10.5 mg/dL Final    Total Protein 04/12/2022 7.2  6.0 - 8.4 g/dL Final    Albumin 04/12/2022 4.3  3.5 - 5.2 g/dL Final    Total Bilirubin 04/12/2022 0.5  0.1 - 1.0 mg/dL Final    Alkaline Phosphatase 04/12/2022 57  55 - 135 U/L Final    AST 04/12/2022 24  10 - 40 U/L Final    ALT 04/12/2022 23  10 - 44 U/L Final    Anion Gap 04/12/2022 6 (A) 8 - 16 mmol/L Final    eGFR if African American 04/12/2022 >60.0  >60 mL/min/1.73 m^2 Final    eGFR if non African American 04/12/2022 >60.0  >60 mL/min/1.73 m^2 Final       Past Medical History:   Diagnosis Date    Anxiety     Breast cancer     Cancer     breast    Depression     Encounter for blood transfusion     History of gastroesophageal reflux (GERD)     Hx pulmonary embolism     Meningitis     Osteoporosis     PONV (postoperative nausea and vomiting)     PVC's (premature ventricular contractions)      Past Surgical History:   Procedure Laterality Date    AUGMENTATION OF BREAST      BREAST BIOPSY      BREAST RECONSTRUCTION      CHOLECYSTECTOMY      COLONOSCOPY  2009    HYSTERECTOMY      INSERTION OF BREAST IMPLANT Bilateral 11/5/2018    Procedure: INSERTION, BREAST IMPLANT;  Surgeon: Nedra Burden MD;  [facility];  Service: Plastics;  Laterality: Bilateral;    MASTECTOMY Bilateral     reconstruction  with tissue expanders    TISSUE EXPANDER REMOVAL Bilateral 11/5/2018    Procedure: REMOVAL, TISSUE EXPANDER;  Surgeon: Nedra Burden MD;  Location[facility];  Service: Plastics;  Laterality: Bilateral;    TUBAL LIGATION       Family History   Problem Relation Age of Onset    Cancer Mother         breast    Heart disease Mother     Diabetes Mother     Heart disease Father     Stroke Father     Heart failure Maternal Aunt     Heart failure Maternal Uncle        Marital Status:   Alcohol History:  reports no history of alcohol use.  Tobacco History:  reports that she has never smoked. She has never used smokeless tobacco.  Drug History:  reports no history of drug use.    Review of patient's allergies indicates:   Allergen Reactions    Adhesive Rash    Fosamax [alendronate]      Bone erosion in jaw    Meperidine        Current Outpatient Medications:     acyclovir (ZOVIRAX) 400 MG tablet, Take 1 tablet (400 mg total) by mouth 2 (two) times daily., Disp: 180 tablet, Rfl: 1    ALPRAZolam (XANAX) 0.5 MG tablet, Take 1 tablet (0.5 mg total) by mouth 2 (two) times daily as needed for Anxiety., Disp: 90 tablet, Rfl: 1    biotin 1 mg tablet, Take 1,000 mcg by mouth 3 (three) times daily., Disp: , Rfl:     citalopram (CELEXA) 10 MG tablet, Take 1 tablet (10 mg total) by mouth once daily., Disp: 90 tablet, Rfl: 1    dfjnepxlk-H7-[id number]-algal oil (METANX/FOLTANX RF) 3 mg-35 mg-2 mg -90.314 mg Cap, Take 1 tablet by mouth once daily., Disp: 30 capsule, Rfl: 6    minoxidiL 5 % Foam, Apply half a capful to scalp QAM, Disp: 60 g, Rfl: 11    multivitamin capsule, Take 1 capsule by mouth once daily., Disp: , Rfl:     temazepam (RESTORIL) 15 mg Cap, Take 1 capsule (15 mg total) by mouth nightly as needed., Disp: 90 capsule, Rfl: 1    vitamin D (VITAMIN D3) 1000 units Tab, Take 1,000 Units by mouth once daily., Disp: , Rfl:   No current facility-administered medications for this  "visit.    Facility-Administered Medications Ordered in Other Visits:     lactated ringers infusion, , Intravenous, Continuous, Charles Cheung MD    lidocaine (PF) 10 mg/ml (1%) injection 2 mg, 0.2 mL, Intradermal, Once, Charles Cheung MD    Review of Systems   Constitutional: Positive for fatigue (Minor, she thinks since covid booster). Negative for activity change, fever and unexpected weight change.   HENT: Negative for congestion.    Respiratory: Negative for apnea, cough, chest tightness and shortness of breath.    Cardiovascular: Negative for chest pain and palpitations.   Gastrointestinal: Negative for abdominal distention and abdominal pain.   Genitourinary: Negative for difficulty urinating and dyspareunia.   Musculoskeletal: Negative for arthralgias and back pain.   Neurological: Negative for dizziness and weakness.          Objective:      Vitals:    07/18/22 1103   BP: 100/68   Pulse: 70   SpO2: 99%   Weight: 44.9 kg (99 lb)   Height: 5' 2" (1.575 m)     Physical Exam  Constitutional:       General: She is not in acute distress.  HENT:      Head: Normocephalic and atraumatic.   Eyes:      Pupils: Pupils are equal, round, and reactive to light.   Cardiovascular:      Rate and Rhythm: Normal rate and regular rhythm.      Heart sounds: Normal heart sounds.   Pulmonary:      Effort: Pulmonary effort is normal.      Breath sounds: Normal breath sounds.   Abdominal:      General: Bowel sounds are normal. There is no distension.      Palpations: Abdomen is soft.      Tenderness: There is no abdominal tenderness.   Musculoskeletal:      Cervical back: Normal range of motion and neck supple.      Left hip: Tenderness present. No crepitus. Decreased range of motion.   Skin:     General: Skin is warm and dry.      Findings: No erythema or rash.   Neurological:      Mental Status: She is alert and oriented to person, place, and time.      Cranial Nerves: No cranial nerve deficit.           Assessment:     "   1. Other screening mammogram    2. Encounter for long-term (current) use of other medications    3. Encounter for therapeutic drug monitoring    4. Anxiety    5. Primary insomnia    6. Malignant neoplasm of central portion of left breast in female, estrogen receptor negative         Plan:       Other screening mammogram  -     Mammo Digital Screening Bilat; Future; Expected date: 07/18/2022    Encounter for long-term (current) use of other medications  -     DRUG MONITOR, PANEL 4, W/CONF, URINE; Future; Expected date: 07/18/2022    Encounter for therapeutic drug monitoring  -     DRUG MONITOR, PANEL 4, W/CONF, URINE; Future; Expected date: 07/18/2022    Anxiety  Comments:  Mood and anxiety stable at this time. Continue as is.  Orders:  -     citalopram (CELEXA) 10 MG tablet; Take 1 tablet (10 mg total) by mouth once daily.  Dispense: 90 tablet; Refill: 1    Primary insomnia  Comments:  refills as needed.  Orders:  -     temazepam (RESTORIL) 15 mg Cap; Take 1 capsule (15 mg total) by mouth nightly as needed.  Dispense: 90 capsule; Refill: 1    Malignant neoplasm of central portion of left breast in female, estrogen receptor negative  Comments:  still follows closely with oncology DR. Singh. Recent PET CT completed. UTD on mammography      Follow up in about 6 months (around 1/18/2023) for annual.        7/18/2022 Tommie Rojas PA-C

## 2022-08-15 ENCOUNTER — TELEPHONE (OUTPATIENT)
Dept: FAMILY MEDICINE | Facility: CLINIC | Age: 62
End: 2022-08-15

## 2022-08-15 RX ORDER — CODEINE PHOSPHATE AND GUAIFENESIN 10; 100 MG/5ML; MG/5ML
5 SOLUTION ORAL EVERY 6 HOURS PRN
Qty: 150 ML | Refills: 0 | Status: SHIPPED | OUTPATIENT
Start: 2022-08-15 | End: 2022-08-25

## 2022-08-15 NOTE — TELEPHONE ENCOUNTER
Please let patient know I sent in cough medication with codeine- I wouldn't recommend she combine with xanax due to risk of oversedation. Stress to her the importance of drinking fluids, rest, Tylenol for fever control and call if she develops shortness of breath or chest pain

## 2022-08-15 NOTE — TELEPHONE ENCOUNTER
Spoke with pt and let her know the below per Leia verbatim. Verbalized understanding to all of the instructions.

## 2022-08-15 NOTE — TELEPHONE ENCOUNTER
----- Message from Surekha Ruiz sent at 8/15/2022  8:38 AM CDT -----  Pt did a home test yesterday for covid and would like to know if we can call in a cough medication. She did not sleep last night. Sore throat, chills, fever, cough.   [facility] on    [phone]

## 2022-10-24 DIAGNOSIS — F41.9 ANXIETY: ICD-10-CM

## 2022-10-24 RX ORDER — ALPRAZOLAM 0.5 MG/1
0.5 TABLET ORAL 2 TIMES DAILY PRN
Qty: 90 TABLET | Refills: 1 | Status: CANCELLED | OUTPATIENT
Start: 2022-10-24 | End: 2023-04-22

## 2022-10-25 ENCOUNTER — PATIENT MESSAGE (OUTPATIENT)
Dept: FAMILY MEDICINE | Facility: CLINIC | Age: 62
End: 2022-10-25

## 2022-10-25 DIAGNOSIS — F41.9 ANXIETY: ICD-10-CM

## 2022-10-25 RX ORDER — ALPRAZOLAM 0.5 MG/1
0.5 TABLET ORAL 2 TIMES DAILY PRN
Qty: 90 TABLET | Refills: 1 | Status: SHIPPED | OUTPATIENT
Start: 2022-10-25 | End: 2023-01-19 | Stop reason: SDUPTHER

## 2022-10-25 NOTE — TELEPHONE ENCOUNTER
Pt is needing a refill on her xanax. Last office visit 07/18/2022. Next office visit 01/19/2023. Chart shows last dispense  07/07/2022. UDS order on 07/18/2022, but not done.     Pt has refill still on her prescription.    Spoke with pt in regards to medication refill request. Verbalized to the pt that she still has a refill on her prescription, all she needs to do is call her pharmacy. Pt acknowledge understanding

## 2022-10-26 ENCOUNTER — PATIENT MESSAGE (OUTPATIENT)
Dept: FAMILY MEDICINE | Facility: CLINIC | Age: 62
End: 2022-10-26

## 2022-10-28 ENCOUNTER — TELEPHONE (OUTPATIENT)
Dept: FAMILY MEDICINE | Facility: CLINIC | Age: 62
End: 2022-10-28

## 2022-10-28 ENCOUNTER — PATIENT MESSAGE (OUTPATIENT)
Dept: FAMILY MEDICINE | Facility: CLINIC | Age: 62
End: 2022-10-28

## 2022-10-28 NOTE — TELEPHONE ENCOUNTER
----- Message from Surekha Ruiz sent at 10/28/2022 11:00 AM CDT -----  -pt has been trying to get an rx filled for a week   [phone]

## 2023-01-19 ENCOUNTER — OFFICE VISIT (OUTPATIENT)
Dept: FAMILY MEDICINE | Facility: CLINIC | Age: 63
End: 2023-01-19
Payer: COMMERCIAL

## 2023-01-19 VITALS
HEART RATE: 74 BPM | HEIGHT: 62 IN | SYSTOLIC BLOOD PRESSURE: 108 MMHG | BODY MASS INDEX: 17.85 KG/M2 | DIASTOLIC BLOOD PRESSURE: 70 MMHG | WEIGHT: 97 LBS

## 2023-01-19 DIAGNOSIS — Z00.00 ROUTINE PHYSICAL EXAMINATION: ICD-10-CM

## 2023-01-19 DIAGNOSIS — F41.9 ANXIETY: ICD-10-CM

## 2023-01-19 DIAGNOSIS — C50.112 MALIGNANT NEOPLASM OF CENTRAL PORTION OF LEFT BREAST IN FEMALE, ESTROGEN RECEPTOR NEGATIVE: Primary | ICD-10-CM

## 2023-01-19 DIAGNOSIS — Z17.1 MALIGNANT NEOPLASM OF CENTRAL PORTION OF LEFT BREAST IN FEMALE, ESTROGEN RECEPTOR NEGATIVE: Primary | ICD-10-CM

## 2023-01-19 DIAGNOSIS — F51.01 PRIMARY INSOMNIA: ICD-10-CM

## 2023-01-19 PROCEDURE — 3074F PR MOST RECENT SYSTOLIC BLOOD PRESSURE < 130 MM HG: ICD-10-PCS | Mod: CPTII,S$GLB,, | Performed by: PHYSICIAN ASSISTANT

## 2023-01-19 PROCEDURE — 3078F PR MOST RECENT DIASTOLIC BLOOD PRESSURE < 80 MM HG: ICD-10-PCS | Mod: CPTII,S$GLB,, | Performed by: PHYSICIAN ASSISTANT

## 2023-01-19 PROCEDURE — 99396 PREV VISIT EST AGE 40-64: CPT | Mod: S$GLB,,, | Performed by: PHYSICIAN ASSISTANT

## 2023-01-19 PROCEDURE — 1159F PR MEDICATION LIST DOCUMENTED IN MEDICAL RECORD: ICD-10-PCS | Mod: CPTII,S$GLB,, | Performed by: PHYSICIAN ASSISTANT

## 2023-01-19 PROCEDURE — 3074F SYST BP LT 130 MM HG: CPT | Mod: CPTII,S$GLB,, | Performed by: PHYSICIAN ASSISTANT

## 2023-01-19 PROCEDURE — 3008F BODY MASS INDEX DOCD: CPT | Mod: CPTII,S$GLB,, | Performed by: PHYSICIAN ASSISTANT

## 2023-01-19 PROCEDURE — 1159F MED LIST DOCD IN RCRD: CPT | Mod: CPTII,S$GLB,, | Performed by: PHYSICIAN ASSISTANT

## 2023-01-19 PROCEDURE — 99396 PR PREVENTIVE VISIT,EST,40-64: ICD-10-PCS | Mod: S$GLB,,, | Performed by: PHYSICIAN ASSISTANT

## 2023-01-19 PROCEDURE — 3078F DIAST BP <80 MM HG: CPT | Mod: CPTII,S$GLB,, | Performed by: PHYSICIAN ASSISTANT

## 2023-01-19 PROCEDURE — 3008F PR BODY MASS INDEX (BMI) DOCUMENTED: ICD-10-PCS | Mod: CPTII,S$GLB,, | Performed by: PHYSICIAN ASSISTANT

## 2023-01-19 RX ORDER — ALPRAZOLAM 0.5 MG/1
0.5 TABLET ORAL 2 TIMES DAILY PRN
Qty: 90 TABLET | Refills: 1 | Status: SHIPPED | OUTPATIENT
Start: 2023-01-19 | End: 2023-05-01 | Stop reason: SDUPTHER

## 2023-01-19 RX ORDER — TEMAZEPAM 15 MG/1
15 CAPSULE ORAL NIGHTLY PRN
Qty: 90 CAPSULE | Refills: 2 | Status: SHIPPED | OUTPATIENT
Start: 2023-01-19 | End: 2023-07-20 | Stop reason: SDUPTHER

## 2023-01-19 NOTE — PROGRESS NOTES
SUBJECTIVE:    Patient ID: Hollie Tsang is a 62 y.o. female.    Chief Complaint: Annual Exam (Annual wellness exam//no med bottles//declined flu vac//tc)    This is a 62-year-old female who presents today for annual wellness exam.  History of triple negative breast cancer.  Still follows with Dr. Singh. On a yearly followup. She is considering getting in with a new oncologist closer to home to follow her on an annual basis. She does still see Dr. Bardales on occasion for PVCs. No longer on BB though. 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 And some persistent low back pain and neuropathy. Seeing DR. Ramirez with interventional pain management.      No visits with results within 6 Month(s) from this visit.   Latest known visit with results is:   Lab Visit on 06/23/2022   Component Date Value Ref Range Status    WBC 06/23/2022 4.95  3.90 - 12.70 K/uL Final    RBC 06/23/2022 4.24  4.00 - 5.40 M/uL Final    Hemoglobin 06/23/2022 12.9  12.0 - 16.0 g/dL Final    Hematocrit 06/23/2022 39.7  37.0 - 48.5 % Final    MCV 06/23/2022 94  82 - 98 fL Final    MCH 06/23/2022 30.4  27.0 - 31.0 pg Final    MCHC 06/23/2022 32.5  32.0 - 36.0 g/dL Final    RDW 06/23/2022 13.1  11.5 - 14.5 % Final    Platelets 06/23/2022 231  150 - 450 K/uL Final    MPV 06/23/2022 9.0 (L)  9.2 - 12.9 fL Final    Immature Granulocytes 06/23/2022 0.2  0.0 - 0.5 % Final    Gran # (ANC) 06/23/2022 3.0  1.8 - 7.7 K/uL Final    Immature Grans (Abs) 06/23/2022 0.01  0.00 - 0.04 K/uL Final    Lymph # 06/23/2022 1.4  1.0 - 4.8 K/uL Final    Mono # 06/23/2022 0.4  0.3 - 1.0 K/uL Final    Eos # 06/23/2022 0.1  0.0 - 0.5 K/uL Final    Baso # 06/23/2022 0.05  0.00 - 0.20 K/uL Final    nRBC 06/23/2022 0  0 /100 WBC Final    Gran % 06/23/2022 60.8  38.0 - 73.0 % Final    Lymph % 06/23/2022 29.1  18.0 - 48.0 % Final    Mono % 06/23/2022 7.7  4.0 - 15.0 % Final    Eosinophil % 06/23/2022 1.2  0.0 - 8.0 % Final    Basophil % 06/23/2022 1.0  0.0 - 1.9 % Final     Differential Method 06/23/2022 Automated   Final    Sodium 06/23/2022 138  136 - 145 mmol/L Final    Potassium 06/23/2022 3.7  3.5 - 5.1 mmol/L Final    Chloride 06/23/2022 103  95 - 110 mmol/L Final    CO2 06/23/2022 30 (H)  23 - 29 mmol/L Final    Glucose 06/23/2022 93  70 - 110 mg/dL Final    BUN 06/23/2022 14  8 - 23 mg/dL Final    Creatinine 06/23/2022 0.7  0.5 - 1.4 mg/dL Final    Calcium 06/23/2022 9.2  8.7 - 10.5 mg/dL Final    Total Protein 06/23/2022 6.9  6.0 - 8.4 g/dL Final    Albumin 06/23/2022 4.3  3.5 - 5.2 g/dL Final    Total Bilirubin 06/23/2022 0.7  0.1 - 1.0 mg/dL Final    Alkaline Phosphatase 06/23/2022 52 (L)  55 - 135 U/L Final    AST 06/23/2022 26  10 - 40 U/L Final    ALT 06/23/2022 25  10 - 44 U/L Final    Anion Gap 06/23/2022 5 (L)  8 - 16 mmol/L Final    eGFR if African American 06/23/2022 >60.0  >60 mL/min/1.73 m^2 Final    eGFR if non African American 06/23/2022 >60.0  >60 mL/min/1.73 m^2 Final       Past Medical History:   Diagnosis Date    Anxiety     Breast cancer     Cancer     breast    Depression     Encounter for blood transfusion     History of gastroesophageal reflux (GERD)     Hx pulmonary embolism     Meningitis     Osteoporosis     PONV (postoperative nausea and vomiting)     PVC's (premature ventricular contractions)      Past Surgical History:   Procedure Laterality Date    AUGMENTATION OF BREAST      BREAST BIOPSY      BREAST RECONSTRUCTION      CHOLECYSTECTOMY      COLONOSCOPY  2009    HYSTERECTOMY      INSERTION OF BREAST IMPLANT Bilateral 11/5/2018    Procedure: INSERTION, BREAST IMPLANT;  Surgeon: Nedra Burden MD;  Location: [facility];  Service: Plastics;  Laterality: Bilateral;    MASTECTOMY Bilateral     reconstruction with tissue expanders    TISSUE EXPANDER REMOVAL Bilateral 11/5/2018    Procedure: REMOVAL, TISSUE EXPANDER;  Surgeon: Nedra Burden MD;  Location: [facility];  Service: Plastics;  Laterality: Bilateral;    TUBAL LIGATION       Family History    Problem Relation Age of Onset    Cancer Mother         breast    Heart disease Mother     Diabetes Mother     Heart disease Father     Stroke Father     Heart failure Maternal Aunt     Heart failure Maternal Uncle        Marital Status:   Alcohol History:  reports no history of alcohol use.  Tobacco History:  reports that she has never smoked. She has never used smokeless tobacco.  Drug History:  reports no history of drug use.    Review of patient's allergies indicates:   Allergen Reactions    Adhesive Rash    Fosamax [alendronate]      Bone erosion in jaw    Meperidine        Current Outpatient Medications:     acyclovir (ZOVIRAX) 400 MG tablet, Take 1 tablet (400 mg total) by mouth 2 (two) times daily., Disp: 180 tablet, Rfl: 1    ALPRAZolam (XANAX) 0.5 MG tablet, Take 1 tablet (0.5 mg total) by mouth 2 (two) times daily as needed for Anxiety., Disp: 90 tablet, Rfl: 1    biotin 1 mg tablet, Take 1,000 mcg by mouth 3 (three) times daily., Disp: , Rfl:     citalopram (CELEXA) 10 MG tablet, Take 1 tablet (10 mg total) by mouth once daily., Disp: 90 tablet, Rfl: 1    [id number]-algal oil (METANX/FOLTANX RF) 3 mg-35 mg-2 mg -90.314 mg Cap, Take 1 tablet by mouth once daily., Disp: 30 capsule, Rfl: 6    multivitamin capsule, Take 1 capsule by mouth once daily., Disp: , Rfl:     prenatal vit,parish 74/iron/folic (PRENATAL VITAMIN 1+1 ORAL), Take by mouth., Disp: , Rfl:     temazepam (RESTORIL) 15 mg Cap, Take 1 capsule (15 mg total) by mouth nightly as needed., Disp: 90 capsule, Rfl: 2  No current facility-administered medications for this visit.    Facility-Administered Medications Ordered in Other Visits:     lactated ringers infusion, , Intravenous, Continuous, Charles Cheung MD    lidocaine (PF) 10 mg/ml (1%) injection 2 mg, 0.2 mL, Intradermal, Once, Charles Cheung MD    Review of Systems   Constitutional:  Negative for appetite change, chills, fatigue, fever and unexpected weight change.  "  HENT:  Negative for congestion.    Respiratory:  Negative for cough, chest tightness and shortness of breath.    Cardiovascular:  Negative for chest pain and palpitations.   Gastrointestinal:  Negative for abdominal distention and abdominal pain.   Endocrine: Negative for cold intolerance and heat intolerance.   Genitourinary:  Negative for difficulty urinating and dysuria.   Musculoskeletal:  Positive for back pain. Negative for arthralgias.        Joint pain   Neurological:  Negative for dizziness, weakness and headaches.        Objective:      Vitals:    01/19/23 1025   BP: 108/70   Pulse: 74   Weight: 44 kg (97 lb)   Height: 5' 2" (1.575 m)     Physical Exam  Constitutional:       General: She is not in acute distress.     Appearance: She is well-developed.   HENT:      Head: Normocephalic and atraumatic.   Eyes:      Conjunctiva/sclera: Conjunctivae normal.      Pupils: Pupils are equal, round, and reactive to light.   Neck:      Thyroid: No thyromegaly.   Cardiovascular:      Rate and Rhythm: Normal rate and regular rhythm.      Heart sounds: Normal heart sounds.   Pulmonary:      Effort: Pulmonary effort is normal.      Breath sounds: Normal breath sounds.   Abdominal:      General: Bowel sounds are normal. There is no distension.      Palpations: Abdomen is soft.      Tenderness: There is no abdominal tenderness.   Musculoskeletal:         General: Normal range of motion.      Cervical back: Normal range of motion and neck supple.   Skin:     General: Skin is warm and dry.      Findings: No erythema.   Neurological:      Mental Status: She is alert and oriented to person, place, and time.      Cranial Nerves: No cranial nerve deficit.         Assessment:       1. Malignant neoplasm of central portion of left breast in female, estrogen receptor negative    2. Primary insomnia    3. Anxiety    4. Routine physical examination           Plan:       Malignant neoplasm of central portion of left breast in " female, estrogen receptor negative  Comments:  she is keeping up with oncology followup. will consider transferring care over to [facility] to be closer to home.    Primary insomnia  Comments:  refills as needed.  Orders:  -     temazepam (RESTORIL) 15 mg Cap; Take 1 capsule (15 mg total) by mouth nightly as needed.  Dispense: 90 capsule; Refill: 2    Anxiety  Comments:  Mood and anxiety stable at this time. Continue as is.  Orders:  -     ALPRAZolam (XANAX) 0.5 MG tablet; Take 1 tablet (0.5 mg total) by mouth 2 (two) times daily as needed for Anxiety.  Dispense: 90 tablet; Refill: 1    Routine physical examination  -     CBC Auto Differential; Future; Expected date: 01/19/2023  -     Comprehensive Metabolic Panel; Future; Expected date: 01/19/2023  -     Lipid Panel; Future; Expected date: 01/19/2023  -     TSH w/reflex to FT4; Future; Expected date: 01/19/2023  -     Urinalysis, Reflex to Urine Culture Urine, Clean Catch      Follow up in about 6 months (around 7/19/2023).        1/19/2023 Tommie Rojas PA-C

## 2023-01-27 DIAGNOSIS — F41.9 ANXIETY: ICD-10-CM

## 2023-01-27 RX ORDER — CITALOPRAM 10 MG/1
10 TABLET ORAL DAILY
Qty: 90 TABLET | Refills: 1 | Status: SHIPPED | OUTPATIENT
Start: 2023-01-27 | End: 2023-07-20 | Stop reason: SDUPTHER

## 2023-01-27 NOTE — TELEPHONE ENCOUNTER
----- Message from Devi Bailey sent at 1/27/2023 11:14 AM CST -----  Yee with [facility] called and stated that the patient need a refill of her citalopram 90 day supply with three refills if any questions please give her a call at [phone]

## 2023-02-17 ENCOUNTER — HOSPITAL ENCOUNTER (EMERGENCY)
Facility: HOSPITAL | Age: 63
Discharge: HOME | End: 2023-02-18
Attending: EMERGENCY MEDICINE
Payer: COMMERCIAL

## 2023-02-17 VITALS
HEIGHT: 62 IN | HEART RATE: 76 BPM | RESPIRATION RATE: 18 BRPM | OXYGEN SATURATION: 99 % | DIASTOLIC BLOOD PRESSURE: 60 MMHG | BODY MASS INDEX: 17.85 KG/M2 | TEMPERATURE: 98 F | SYSTOLIC BLOOD PRESSURE: 129 MMHG | WEIGHT: 97 LBS

## 2023-02-17 DIAGNOSIS — S93.602A FOOT SPRAIN, LEFT, INITIAL ENCOUNTER: Primary | ICD-10-CM

## 2023-02-17 PROCEDURE — 99283 EMERGENCY DEPT VISIT LOW MDM: CPT | Mod: 25

## 2023-02-17 PROCEDURE — 25000003 PHARM REV CODE 250: Performed by: EMERGENCY MEDICINE

## 2023-02-17 RX ORDER — IBUPROFEN 600 MG/1
600 TABLET ORAL
Status: COMPLETED | OUTPATIENT
Start: 2023-02-17 | End: 2023-02-17

## 2023-02-17 RX ADMIN — IBUPROFEN 600 MG: 600 TABLET ORAL at 11:02

## 2023-02-18 RX ORDER — IBUPROFEN 800 MG/1
800 TABLET ORAL EVERY 6 HOURS PRN
Qty: 20 TABLET | Refills: 0 | Status: SHIPPED | OUTPATIENT
Start: 2023-02-18 | End: 2023-08-10

## 2023-02-18 RX ORDER — HYDROCODONE BITARTRATE AND ACETAMINOPHEN 5; 325 MG/1; MG/1
1 TABLET ORAL EVERY 4 HOURS PRN
Qty: 12 TABLET | Refills: 0 | Status: SHIPPED | OUTPATIENT
Start: 2023-02-18 | End: 2023-02-20

## 2023-02-18 NOTE — ED PROVIDER NOTES
Encounter Date: 2/17/2023       History     Chief Complaint   Patient presents with    Foot Injury     Pt c/o pain to her L foot after stepping wrong      HPI  63-year-old woman complaining of acute onset of left foot pain status post inversion injury of her left foot while chasing after her dog.  Patient is having difficulty weight-bearing.  Has not taken anything for pain.  Pain is nonradiating.  She notes associated swelling and bruising.  Review of patient's allergies indicates:   Allergen Reactions    Adhesive Rash    Fosamax [alendronate]      Bone erosion in jaw    Meperidine      Past Medical History:   Diagnosis Date    Anxiety     Breast cancer     Cancer     breast    Depression     Encounter for blood transfusion     History of gastroesophageal reflux (GERD)     Hx pulmonary embolism     Meningitis     Osteoporosis     PONV (postoperative nausea and vomiting)     PVC's (premature ventricular contractions)      Past Surgical History:   Procedure Laterality Date    AUGMENTATION OF BREAST      BREAST BIOPSY      BREAST RECONSTRUCTION      CHOLECYSTECTOMY      COLONOSCOPY  2009    HYSTERECTOMY      INSERTION OF BREAST IMPLANT Bilateral 11/5/2018    Procedure: INSERTION, BREAST IMPLANT;  Surgeon: Nedra Burden MD;  Location: [facility];  Service: Plastics;  Laterality: Bilateral;    MASTECTOMY Bilateral     reconstruction with tissue expanders    TISSUE EXPANDER REMOVAL Bilateral 11/5/2018    Procedure: REMOVAL, TISSUE EXPANDER;  Surgeon: Nedra Burden MD;  Location: [facility];  Service: Plastics;  Laterality: Bilateral;    TUBAL LIGATION       Family History   Problem Relation Age of Onset    Cancer Mother         breast    Heart disease Mother     Diabetes Mother     Heart disease Father     Stroke Father     Heart failure Maternal Aunt     Heart failure Maternal Uncle      Social History     Tobacco Use    Smoking status: Never    Smokeless tobacco: Never   Substance Use Topics    Alcohol use: No     Drug use: No     Review of Systems   Constitutional:  Negative for fever.   HENT:  Negative for sore throat.    Respiratory:  Negative for shortness of breath.    Cardiovascular:  Negative for chest pain.   Gastrointestinal:  Negative for nausea.   Genitourinary:  Negative for dysuria.   Musculoskeletal:  Positive for arthralgias and gait problem. Negative for back pain.   Skin:  Negative for rash.   Neurological:  Negative for weakness.   Hematological:  Does not bruise/bleed easily.     Physical Exam     Initial Vitals [02/17/23 2124]   BP Pulse Resp Temp SpO2   129/60 76 18 98.1 °F (36.7 °C) 99 %      MAP       --         Physical Exam    Nursing note and vitals reviewed.  Constitutional: She appears well-developed and well-nourished. No distress.   HENT:   Head: Normocephalic and atraumatic.   Eyes: EOM are normal. Pupils are equal, round, and reactive to light.   Neck: Neck supple.   Pulmonary/Chest: No respiratory distress.   Musculoskeletal:         General: Normal range of motion.      Cervical back: Neck supple.      Comments: There is focal tenderness and swelling with bruising in the area of her anterior talofibular ligament.  She has no tenderness over the navicular bone, no posterior malleolar tenderness and no tenderness at the base of the 5th metatarsal.  She has normal pulses, full range of motion normal strength.     Neurological: She is alert and oriented to person, place, and time.   Skin: Skin is warm and dry.       ED Course   Procedures  Labs Reviewed - No data to display       Imaging Results              X-Ray Foot Complete Left (Final result)  Result time 02/17/23 23:34:23      Final result by Abril Cummings MD (02/17/23 23:34:23)                   Impression:      No appreciable acute abnormality.    Hallux valgus deformity and degenerative changes of the great toe.      Electronically signed by: Abril Cummings  Date:    02/17/2023  Time:    23:34               Narrative:     EXAMINATION:  XR FOOT COMPLETE 3 VIEW LEFT    CLINICAL HISTORY:  .  Unspecified sprain of left foot, initial encounter    TECHNIQUE:  AP, lateral and oblique views of the left foot were performed.    COMPARISON:  None    FINDINGS:  There is osteopenia limiting evaluation.  There is no appreciable acute fracture or dislocation.  Soft tissues are unremarkable for radiopaque foreign bodies.  Hallux valgus deformity of the great toe noted as well as mild degenerative changes at the MTP joint.                                       Medications   ibuprofen tablet 600 mg (600 mg Oral Given 2/17/23 5025)     Medical Decision Making:   History:   Old Medical Records: I decided to obtain old medical records.  Initial Assessment:   This is an emergent evaluation for a patient complaining of left foot pain.    The patient appears to have an foot sprain.   The patient's xrays show no signs of fracture, dislocation, or subluxation.  The patient could have a ligamentous injury, but the ankle doesn't appear to be unstable.   They will be treated with supportive care with an walking boot support and crutches.  The patient may take NSAIDs for their pain.The patient will be discharged home to follow up with their physician or the orthopedic doctor provided.     The results and physical exam findings were reviewed with the patient. Pt agrees with assessment, disposition and treatment plan and has no further questions or complaints at this time.    Bay Watson M.D. 5:37 AM 2/18/2023            Independently Interpreted Test(s):   I have ordered and independently interpreted X-rays - see summary below.       <> Summary of X-Ray Reading(s): X-ray left foot shows no evidence of fracture or dislocation.                        Clinical Impression:   Final diagnoses:  [Q51.821F] Foot sprain, left, initial encounter (Primary)        ED Disposition Condition    Discharge Stable          ED Prescriptions       Medication Sig Dispense Start  Date End Date Auth. Provider    ibuprofen (ADVIL,MOTRIN) 800 MG tablet Take 1 tablet (800 mg total) by mouth every 6 (six) hours as needed for Pain. 20 tablet 2/18/2023 -- Bya Watson MD    HYDROcodone-acetaminophen (NORCO) 5-325 mg per tablet Take 1 tablet by mouth every 4 (four) hours as needed. 12 tablet 2/18/2023 2/20/2023 Bay Watson MD          Follow-up Information       Follow up With Specialties Details Why Contact Info    Jose Ocasio MD Orthopedic Surgery Schedule an appointment as soon as possible for a visit in 1 week  [address] [place] [address]  [address]  [facility]  [facility] [address]  [phone]      [facility] Emergency Medicine  As needed, If symptoms worsen [address] [place] [id number] [phone]             Bay Watson MD  02/18/23 0519

## 2023-03-17 DIAGNOSIS — F41.9 ANXIETY: ICD-10-CM

## 2023-03-17 RX ORDER — ALPRAZOLAM 0.5 MG/1
0.5 TABLET ORAL 2 TIMES DAILY PRN
Qty: 90 TABLET | Refills: 1 | Status: CANCELLED | OUTPATIENT
Start: 2023-03-17 | End: 2023-09-13

## 2023-04-21 DIAGNOSIS — M54.17 RADICULOPATHY, LUMBOSACRAL REGION: Primary | ICD-10-CM

## 2023-04-25 DIAGNOSIS — F41.9 ANXIETY: ICD-10-CM

## 2023-04-25 RX ORDER — ALPRAZOLAM 0.5 MG/1
0.5 TABLET ORAL 2 TIMES DAILY PRN
Qty: 90 TABLET | Refills: 1 | Status: CANCELLED | OUTPATIENT
Start: 2023-04-25 | End: 2023-10-22

## 2023-05-01 DIAGNOSIS — F41.9 ANXIETY: ICD-10-CM

## 2023-05-01 RX ORDER — ALPRAZOLAM 0.5 MG/1
0.5 TABLET ORAL 2 TIMES DAILY PRN
Qty: 180 TABLET | Refills: 1 | Status: SHIPPED | OUTPATIENT
Start: 2023-05-01 | End: 2023-07-20 | Stop reason: SDUPTHER

## 2023-05-01 NOTE — TELEPHONE ENCOUNTER
----- Message from Ca Hardy MA sent at 5/1/2023  9:20 AM CDT -----    ----- Message -----  From: Surekha Ruzi  Sent: 5/1/2023   9:04 AM CDT  To: Froylan Orozco [facility] 8:53- pt is calling about a refill that was denied and [facility] has none   [phone]

## 2023-05-01 NOTE — TELEPHONE ENCOUNTER
Spoke with pt she needs new rx [facility] to [facility] for 90 days not 30, rx set up for you to send in

## 2023-05-12 ENCOUNTER — HOSPITAL ENCOUNTER (OUTPATIENT)
Dept: RADIOLOGY | Facility: HOSPITAL | Age: 63
Discharge: HOME | End: 2023-05-12
Attending: ANESTHESIOLOGY
Payer: COMMERCIAL

## 2023-05-12 DIAGNOSIS — M54.17 RADICULOPATHY, LUMBOSACRAL REGION: ICD-10-CM

## 2023-05-12 PROCEDURE — 72148 MRI LUMBAR SPINE W/O DYE: CPT | Mod: TC,PO

## 2023-05-22 DIAGNOSIS — C50.112 MALIGNANT NEOPLASM OF CENTRAL PORTION OF LEFT BREAST IN FEMALE, ESTROGEN RECEPTOR NEGATIVE: Primary | ICD-10-CM

## 2023-05-22 DIAGNOSIS — I26.99 ACUTE PULMONARY EMBOLISM: ICD-10-CM

## 2023-05-22 DIAGNOSIS — C50.212 CARCINOMA OF BREAST UPPER INNER QUADRANT, LEFT: ICD-10-CM

## 2023-05-22 DIAGNOSIS — Z17.1 MALIGNANT NEOPLASM OF CENTRAL PORTION OF LEFT BREAST IN FEMALE, ESTROGEN RECEPTOR NEGATIVE: Primary | ICD-10-CM

## 2023-05-26 ENCOUNTER — HOSPITAL ENCOUNTER (OUTPATIENT)
Dept: RADIOLOGY | Facility: HOSPITAL | Age: 63
Discharge: HOME | End: 2023-05-26
Attending: PHYSICIAN ASSISTANT
Payer: COMMERCIAL

## 2023-05-26 DIAGNOSIS — C50.112 MALIGNANT NEOPLASM OF CENTRAL PORTION OF LEFT BREAST IN FEMALE, ESTROGEN RECEPTOR NEGATIVE: ICD-10-CM

## 2023-05-26 DIAGNOSIS — C50.212 CARCINOMA OF BREAST UPPER INNER QUADRANT, LEFT: ICD-10-CM

## 2023-05-26 DIAGNOSIS — I26.99 ACUTE PULMONARY EMBOLISM: ICD-10-CM

## 2023-05-26 DIAGNOSIS — Z17.1 MALIGNANT NEOPLASM OF CENTRAL PORTION OF LEFT BREAST IN FEMALE, ESTROGEN RECEPTOR NEGATIVE: ICD-10-CM

## 2023-05-26 PROCEDURE — 72128 CT CHEST SPINE W/O DYE: CPT | Mod: TC

## 2023-05-26 PROCEDURE — 72131 CT LUMBAR SPINE W/O DYE: CPT | Mod: TC

## 2023-07-07 ENCOUNTER — TELEPHONE (OUTPATIENT)
Dept: FAMILY MEDICINE | Facility: CLINIC | Age: 63
End: 2023-07-07

## 2023-07-19 ENCOUNTER — TELEPHONE (OUTPATIENT)
Dept: FAMILY MEDICINE | Facility: CLINIC | Age: 63
End: 2023-07-19

## 2023-07-19 DIAGNOSIS — R39.9 UTI SYMPTOMS: Primary | ICD-10-CM

## 2023-07-19 NOTE — TELEPHONE ENCOUNTER
----- Message from Surekha Ruiz sent at 7/19/2023  8:42 AM CDT -----  Please change ua to [facility] and not [facility]. She will be coming to do her labs tomorrow morning before her appt

## 2023-07-20 ENCOUNTER — OFFICE VISIT (OUTPATIENT)
Dept: FAMILY MEDICINE | Facility: CLINIC | Age: 63
End: 2023-07-20
Payer: COMMERCIAL

## 2023-07-20 VITALS
WEIGHT: 101 LBS | HEART RATE: 68 BPM | SYSTOLIC BLOOD PRESSURE: 108 MMHG | HEIGHT: 61 IN | DIASTOLIC BLOOD PRESSURE: 60 MMHG | BODY MASS INDEX: 19.07 KG/M2

## 2023-07-20 DIAGNOSIS — F41.9 ANXIETY: ICD-10-CM

## 2023-07-20 DIAGNOSIS — F51.01 PRIMARY INSOMNIA: ICD-10-CM

## 2023-07-20 DIAGNOSIS — Z00.00 ROUTINE PHYSICAL EXAMINATION: Primary | ICD-10-CM

## 2023-07-20 PROCEDURE — 3074F PR MOST RECENT SYSTOLIC BLOOD PRESSURE < 130 MM HG: ICD-10-PCS | Mod: CPTII,S$GLB,, | Performed by: PHYSICIAN ASSISTANT

## 2023-07-20 PROCEDURE — 99396 PR PREVENTIVE VISIT,EST,40-64: ICD-10-PCS | Mod: S$GLB,,, | Performed by: PHYSICIAN ASSISTANT

## 2023-07-20 PROCEDURE — 3074F SYST BP LT 130 MM HG: CPT | Mod: CPTII,S$GLB,, | Performed by: PHYSICIAN ASSISTANT

## 2023-07-20 PROCEDURE — 1159F MED LIST DOCD IN RCRD: CPT | Mod: CPTII,S$GLB,, | Performed by: PHYSICIAN ASSISTANT

## 2023-07-20 PROCEDURE — 3078F DIAST BP <80 MM HG: CPT | Mod: CPTII,S$GLB,, | Performed by: PHYSICIAN ASSISTANT

## 2023-07-20 PROCEDURE — 3008F PR BODY MASS INDEX (BMI) DOCUMENTED: ICD-10-PCS | Mod: CPTII,S$GLB,, | Performed by: PHYSICIAN ASSISTANT

## 2023-07-20 PROCEDURE — 1159F PR MEDICATION LIST DOCUMENTED IN MEDICAL RECORD: ICD-10-PCS | Mod: CPTII,S$GLB,, | Performed by: PHYSICIAN ASSISTANT

## 2023-07-20 PROCEDURE — 3078F PR MOST RECENT DIASTOLIC BLOOD PRESSURE < 80 MM HG: ICD-10-PCS | Mod: CPTII,S$GLB,, | Performed by: PHYSICIAN ASSISTANT

## 2023-07-20 PROCEDURE — 99396 PREV VISIT EST AGE 40-64: CPT | Mod: S$GLB,,, | Performed by: PHYSICIAN ASSISTANT

## 2023-07-20 PROCEDURE — 3008F BODY MASS INDEX DOCD: CPT | Mod: CPTII,S$GLB,, | Performed by: PHYSICIAN ASSISTANT

## 2023-07-20 RX ORDER — TEMAZEPAM 15 MG/1
15 CAPSULE ORAL NIGHTLY PRN
Qty: 90 CAPSULE | Refills: 1 | Status: SHIPPED | OUTPATIENT
Start: 2023-07-20 | End: 2023-11-26 | Stop reason: SDUPTHER

## 2023-07-20 RX ORDER — ALPRAZOLAM 0.5 MG/1
0.5 TABLET ORAL 2 TIMES DAILY PRN
Qty: 180 TABLET | Refills: 1 | Status: SHIPPED | OUTPATIENT
Start: 2023-07-20 | End: 2024-01-21 | Stop reason: SDUPTHER

## 2023-07-20 RX ORDER — CITALOPRAM 10 MG/1
10 TABLET ORAL DAILY
Qty: 90 TABLET | Refills: 1 | Status: SHIPPED | OUTPATIENT
Start: 2023-07-20 | End: 2024-02-01 | Stop reason: SDUPTHER

## 2023-07-20 RX ORDER — ALCOHOL 2.38 KG/3.79L
1 GEL TOPICAL DAILY
Qty: 30 CAPSULE | Refills: 6 | Status: CANCELLED | OUTPATIENT
Start: 2023-07-20

## 2023-07-20 NOTE — PROGRESS NOTES
SUBJECTIVE:    Patient ID: Hollie Tsang is a 63 y.o. female.    Chief Complaint: Follow-up (6mth, went over meds verbally// SW)    63-year-old female presents today for six-month checkup. Reports that she has been doing pretty well. She did complete labs for me today. Still follows up with oncology. PET CT scheduled for Monday. Has had some imaging of upper and lower back. Radiation oncologist recommended PET.       No visits with results within 6 Month(s) from this visit.   Latest known visit with results is:   Lab Visit on 06/23/2022   Component Date Value Ref Range Status    WBC 06/23/2022 4.95  3.90 - 12.70 K/uL Final    RBC 06/23/2022 4.24  4.00 - 5.40 M/uL Final    Hemoglobin 06/23/2022 12.9  12.0 - 16.0 g/dL Final    Hematocrit 06/23/2022 39.7  37.0 - 48.5 % Final    MCV 06/23/2022 94  82 - 98 fL Final    MCH 06/23/2022 30.4  27.0 - 31.0 pg Final    MCHC 06/23/2022 32.5  32.0 - 36.0 g/dL Final    RDW 06/23/2022 13.1  11.5 - 14.5 % Final    Platelets 06/23/2022 231  150 - 450 K/uL Final    MPV 06/23/2022 9.0 (L)  9.2 - 12.9 fL Final    Immature Granulocytes 06/23/2022 0.2  0.0 - 0.5 % Final    Gran # (ANC) 06/23/2022 3.0  1.8 - 7.7 K/uL Final    Immature Grans (Abs) 06/23/2022 0.01  0.00 - 0.04 K/uL Final    Lymph # 06/23/2022 1.4  1.0 - 4.8 K/uL Final    Mono # 06/23/2022 0.4  0.3 - 1.0 K/uL Final    Eos # 06/23/2022 0.1  0.0 - 0.5 K/uL Final    Baso # 06/23/2022 0.05  0.00 - 0.20 K/uL Final    nRBC 06/23/2022 0  0 /100 WBC Final    Gran % 06/23/2022 60.8  38.0 - 73.0 % Final    Lymph % 06/23/2022 29.1  18.0 - 48.0 % Final    Mono % 06/23/2022 7.7  4.0 - 15.0 % Final    Eosinophil % 06/23/2022 1.2  0.0 - 8.0 % Final    Basophil % 06/23/2022 1.0  0.0 - 1.9 % Final    Differential Method 06/23/2022 Automated   Final    Sodium 06/23/2022 138  136 - 145 mmol/L Final    Potassium 06/23/2022 3.7  3.5 - 5.1 mmol/L Final    Chloride 06/23/2022 103  95 - 110 mmol/L Final    CO2 06/23/2022 30 (H)  23 - 29 mmol/L  Final    Glucose 06/23/2022 93  70 - 110 mg/dL Final    BUN 06/23/2022 14  8 - 23 mg/dL Final    Creatinine 06/23/2022 0.7  0.5 - 1.4 mg/dL Final    Calcium 06/23/2022 9.2  8.7 - 10.5 mg/dL Final    Total Protein 06/23/2022 6.9  6.0 - 8.4 g/dL Final    Albumin 06/23/2022 4.3  3.5 - 5.2 g/dL Final    Total Bilirubin 06/23/2022 0.7  0.1 - 1.0 mg/dL Final    Alkaline Phosphatase 06/23/2022 52 (L)  55 - 135 U/L Final    AST 06/23/2022 26  10 - 40 U/L Final    ALT 06/23/2022 25  10 - 44 U/L Final    Anion Gap 06/23/2022 5 (L)  8 - 16 mmol/L Final    eGFR if African American 06/23/2022 >60.0  >60 mL/min/1.73 m^2 Final    eGFR if non African American 06/23/2022 >60.0  >60 mL/min/1.73 m^2 Final       Past Medical History:   Diagnosis Date    Anxiety     Breast cancer     Cancer     breast    Depression     Encounter for blood transfusion     History of gastroesophageal reflux (GERD)     Hx pulmonary embolism     Meningitis     Osteoporosis     PONV (postoperative nausea and vomiting)     PVC's (premature ventricular contractions)      Past Surgical History:   Procedure Laterality Date    AUGMENTATION OF BREAST      BREAST BIOPSY      BREAST RECONSTRUCTION      CHOLECYSTECTOMY      COLONOSCOPY  2009    HYSTERECTOMY      INSERTION OF BREAST IMPLANT Bilateral 11/5/2018    Procedure: INSERTION, BREAST IMPLANT;  Surgeon: Nedra Burden MD;  Location: [facility];  Service: Plastics;  Laterality: Bilateral;    MASTECTOMY Bilateral     reconstruction with tissue expanders    TISSUE EXPANDER REMOVAL Bilateral 11/5/2018    Procedure: REMOVAL, TISSUE EXPANDER;  Surgeon: Nedra Burden MD;  Location: [facility];  Service: Plastics;  Laterality: Bilateral;    TUBAL LIGATION       Family History   Problem Relation Age of Onset    Cancer Mother         breast    Heart disease Mother     Diabetes Mother     Heart disease Father     Stroke Father     Heart failure Maternal Aunt     Heart failure Maternal Uncle        Marital Status:    Alcohol History:  reports no history of alcohol use.  Tobacco History:  reports that she has never smoked. She has never used smokeless tobacco.  Drug History:  reports no history of drug use.    Review of patient's allergies indicates:   Allergen Reactions    Adhesive Rash    Fosamax [alendronate]      Bone erosion in jaw    Meperidine        Current Outpatient Medications:     acyclovir (ZOVIRAX) 400 MG tablet, Take 1 tablet (400 mg total) by mouth 2 (two) times daily., Disp: 180 tablet, Rfl: 1    biotin 1 mg tablet, Take 1,000 mcg by mouth 3 (three) times daily., Disp: , Rfl:     ibuprofen (ADVIL,MOTRIN) 800 MG tablet, Take 1 tablet (800 mg total) by mouth every 6 (six) hours as needed for Pain., Disp: 20 tablet, Rfl: 0    [id number]-algal oil (METANX/FOLTANX RF) 3 mg-35 mg-2 mg -90.314 mg Cap, Take 1 tablet by mouth once daily., Disp: 30 capsule, Rfl: 6    multivitamin capsule, Take 1 capsule by mouth once daily., Disp: , Rfl:     prenatal vit,parish 74/iron/folic (PRENATAL VITAMIN 1+1 ORAL), Take by mouth., Disp: , Rfl:     ALPRAZolam (XANAX) 0.5 MG tablet, Take 1 tablet (0.5 mg total) by mouth 2 (two) times daily as needed for Anxiety., Disp: 180 tablet, Rfl: 1    citalopram (CELEXA) 10 MG tablet, Take 1 tablet (10 mg total) by mouth once daily., Disp: 90 tablet, Rfl: 1    temazepam (RESTORIL) 15 mg Cap, Take 1 capsule (15 mg total) by mouth nightly as needed (insomnia)., Disp: 90 capsule, Rfl: 1  No current facility-administered medications for this visit.    Facility-Administered Medications Ordered in Other Visits:     lactated ringers infusion, , Intravenous, Continuous, Charles Cheung MD    lidocaine (PF) 10 mg/ml (1%) injection 2 mg, 0.2 mL, Intradermal, Once, Charles Cheung MD    Review of Systems   Constitutional:  Negative for appetite change, chills, fatigue, fever and unexpected weight change.   HENT:  Negative for congestion.    Respiratory:  Negative for cough, chest  "tightness and shortness of breath.    Cardiovascular:  Negative for chest pain and palpitations.   Gastrointestinal:  Negative for abdominal distention and abdominal pain.   Endocrine: Negative for cold intolerance and heat intolerance.   Genitourinary:  Negative for difficulty urinating and dysuria.   Musculoskeletal:  Positive for back pain. Negative for arthralgias.        Joint pain   Neurological:  Negative for dizziness, weakness and headaches.        Objective:      Vitals:    07/20/23 0940   BP: 108/60   Pulse: 68   Weight: 45.8 kg (101 lb)   Height: 5' 1" (1.549 m)     Physical Exam  Constitutional:       General: She is not in acute distress.     Appearance: She is well-developed.   HENT:      Head: Normocephalic and atraumatic.   Eyes:      Conjunctiva/sclera: Conjunctivae normal.      Pupils: Pupils are equal, round, and reactive to light.   Neck:      Thyroid: No thyromegaly.   Cardiovascular:      Rate and Rhythm: Normal rate and regular rhythm.      Heart sounds: Normal heart sounds.   Pulmonary:      Effort: Pulmonary effort is normal.      Breath sounds: Normal breath sounds.   Abdominal:      General: Bowel sounds are normal. There is no distension.      Palpations: Abdomen is soft.      Tenderness: There is no abdominal tenderness.   Musculoskeletal:         General: Normal range of motion.      Cervical back: Normal range of motion and neck supple.   Skin:     General: Skin is warm and dry.      Findings: No erythema.   Neurological:      Mental Status: She is alert and oriented to person, place, and time.      Cranial Nerves: No cranial nerve deficit.         Assessment:       1. Routine physical examination    2. Anxiety    3. Primary insomnia         Plan:       Routine physical examination  Comments:  Labs done today for ongoing pt care. will review when available.    Anxiety  Comments:  Mood and anxiety stable at this time. Continue as is.  Orders:  -     ALPRAZolam (XANAX) 0.5 MG tablet; " Take 1 tablet (0.5 mg total) by mouth 2 (two) times daily as needed for Anxiety.  Dispense: 180 tablet; Refill: 1  -     citalopram (CELEXA) 10 MG tablet; Take 1 tablet (10 mg total) by mouth once daily.  Dispense: 90 tablet; Refill: 1    Primary insomnia  Comments:  refills as needed.  Orders:  -     temazepam (RESTORIL) 15 mg Cap; Take 1 capsule (15 mg total) by mouth nightly as needed (insomnia).  Dispense: 90 capsule; Refill: 1      Follow up in about 6 months (around 1/20/2024).        7/20/2023 Tommie Rojas PA-C

## 2023-07-21 LAB
ALBUMIN SERPL-MCNC: 4 G/DL (ref 3.6–5.1)
ALBUMIN/GLOB SERPL: 1.7 (CALC) (ref 1–2.5)
ALP SERPL-CCNC: 66 U/L (ref 37–153)
ALT SERPL-CCNC: 21 U/L (ref 6–29)
APPEARANCE UR: CLEAR
AST SERPL-CCNC: 22 U/L (ref 10–35)
BACTERIA #/AREA URNS HPF: NORMAL /HPF
BACTERIA UR CULT: NORMAL
BASOPHILS # BLD AUTO: 50 CELLS/UL (ref 0–200)
BASOPHILS NFR BLD AUTO: 1.2 %
BILIRUB SERPL-MCNC: 0.4 MG/DL (ref 0.2–1.2)
BILIRUB UR QL STRIP: NEGATIVE
BUN SERPL-MCNC: 13 MG/DL (ref 7–25)
BUN/CREAT SERPL: NORMAL (CALC) (ref 6–22)
CALCIUM SERPL-MCNC: 9.2 MG/DL (ref 8.6–10.4)
CHLORIDE SERPL-SCNC: 105 MMOL/L (ref 98–110)
CHOLEST SERPL-MCNC: 167 MG/DL
CHOLEST/HDLC SERPL: 2.8 (CALC)
CO2 SERPL-SCNC: 29 MMOL/L (ref 20–32)
COLOR UR: YELLOW
CREAT SERPL-MCNC: 0.62 MG/DL (ref 0.5–1.05)
EGFR: 99 ML/MIN/1.73M2
EOSINOPHIL # BLD AUTO: 50 CELLS/UL (ref 15–500)
EOSINOPHIL NFR BLD AUTO: 1.2 %
ERYTHROCYTE [DISTWIDTH] IN BLOOD BY AUTOMATED COUNT: 12.8 % (ref 11–15)
GLOBULIN SER CALC-MCNC: 2.3 G/DL (CALC) (ref 1.9–3.7)
GLUCOSE SERPL-MCNC: 80 MG/DL (ref 65–99)
GLUCOSE UR QL STRIP: NEGATIVE
HCT VFR BLD AUTO: 38.3 % (ref 35–45)
HDLC SERPL-MCNC: 59 MG/DL
HGB BLD-MCNC: 12.1 G/DL (ref 11.7–15.5)
HGB UR QL STRIP: NEGATIVE
HYALINE CASTS #/AREA URNS LPF: NORMAL /LPF
KETONES UR QL STRIP: NEGATIVE
LDLC SERPL CALC-MCNC: 97 MG/DL (CALC)
LEUKOCYTE ESTERASE UR QL STRIP: NEGATIVE
LYMPHOCYTES # BLD AUTO: 1113 CELLS/UL (ref 850–3900)
LYMPHOCYTES NFR BLD AUTO: 26.5 %
MCH RBC QN AUTO: 29.5 PG (ref 27–33)
MCHC RBC AUTO-ENTMCNC: 31.6 G/DL (ref 32–36)
MCV RBC AUTO: 93.4 FL (ref 80–100)
MONOCYTES # BLD AUTO: 302 CELLS/UL (ref 200–950)
MONOCYTES NFR BLD AUTO: 7.2 %
NEUTROPHILS # BLD AUTO: 2684 CELLS/UL (ref 1500–7800)
NEUTROPHILS NFR BLD AUTO: 63.9 %
NITRITE UR QL STRIP: NEGATIVE
NONHDLC SERPL-MCNC: 108 MG/DL (CALC)
PH UR STRIP: 6.5 [PH] (ref 5–8)
PLATELET # BLD AUTO: 236 THOUSAND/UL (ref 140–400)
PMV BLD REES-ECKER: 9.3 FL (ref 7.5–12.5)
POTASSIUM SERPL-SCNC: 4 MMOL/L (ref 3.5–5.3)
PROT SERPL-MCNC: 6.3 G/DL (ref 6.1–8.1)
PROT UR QL STRIP: NEGATIVE
RBC # BLD AUTO: 4.1 MILLION/UL (ref 3.8–5.1)
RBC #/AREA URNS HPF: NORMAL /HPF
SERVICE CMNT-IMP: NORMAL
SODIUM SERPL-SCNC: 141 MMOL/L (ref 135–146)
SP GR UR STRIP: 1.01 (ref 1–1.03)
SQUAMOUS #/AREA URNS HPF: NORMAL /HPF
TRIGL SERPL-MCNC: 38 MG/DL
TSH SERPL-ACNC: 0.52 MIU/L (ref 0.4–4.5)
WBC # BLD AUTO: 4.2 THOUSAND/UL (ref 3.8–10.8)
WBC #/AREA URNS HPF: NORMAL /HPF

## 2023-08-10 ENCOUNTER — OFFICE VISIT (OUTPATIENT)
Dept: DERMATOLOGY | Facility: CLINIC | Age: 63
End: 2023-08-10
Payer: COMMERCIAL

## 2023-08-10 VITALS — BODY MASS INDEX: 19.07 KG/M2 | WEIGHT: 101 LBS | HEIGHT: 61 IN

## 2023-08-10 DIAGNOSIS — D22.9 MULTIPLE BENIGN NEVI: ICD-10-CM

## 2023-08-10 DIAGNOSIS — D48.5 NEOPLASM OF UNCERTAIN BEHAVIOR OF SKIN: Primary | ICD-10-CM

## 2023-08-10 DIAGNOSIS — D18.01 CHERRY ANGIOMA: ICD-10-CM

## 2023-08-10 DIAGNOSIS — L57.8 OTHER SKIN CHANGES DUE TO CHRONIC EXPOSURE TO NONIONIZING RADIATION: ICD-10-CM

## 2023-08-10 DIAGNOSIS — Z12.83 SKIN CANCER SCREENING: ICD-10-CM

## 2023-08-10 DIAGNOSIS — L81.4 SOLAR LENTIGO: ICD-10-CM

## 2023-08-10 DIAGNOSIS — L82.1 SEBORRHEIC KERATOSES: ICD-10-CM

## 2023-08-10 PROCEDURE — 3008F BODY MASS INDEX DOCD: CPT | Mod: CPTII,S$GLB,, | Performed by: DERMATOLOGY

## 2023-08-10 PROCEDURE — 88305 TISSUE EXAM BY PATHOLOGIST: CPT | Performed by: PATHOLOGY

## 2023-08-10 PROCEDURE — 99213 PR OFFICE/OUTPT VISIT, EST, LEVL III, 20-29 MIN: ICD-10-PCS | Mod: 25,S$GLB,, | Performed by: DERMATOLOGY

## 2023-08-10 PROCEDURE — 1160F PR REVIEW ALL MEDS BY PRESCRIBER/CLIN PHARMACIST DOCUMENTED: ICD-10-PCS | Mod: CPTII,S$GLB,, | Performed by: DERMATOLOGY

## 2023-08-10 PROCEDURE — 3008F PR BODY MASS INDEX (BMI) DOCUMENTED: ICD-10-PCS | Mod: CPTII,S$GLB,, | Performed by: DERMATOLOGY

## 2023-08-10 PROCEDURE — 1159F PR MEDICATION LIST DOCUMENTED IN MEDICAL RECORD: ICD-10-PCS | Mod: CPTII,S$GLB,, | Performed by: DERMATOLOGY

## 2023-08-10 PROCEDURE — 1160F RVW MEDS BY RX/DR IN RCRD: CPT | Mod: CPTII,S$GLB,, | Performed by: DERMATOLOGY

## 2023-08-10 PROCEDURE — 88305 TISSUE EXAM BY PATHOLOGIST: ICD-10-PCS | Mod: 26,,, | Performed by: PATHOLOGY

## 2023-08-10 PROCEDURE — 11102 TANGNTL BX SKIN SINGLE LES: CPT | Mod: S$GLB,,, | Performed by: DERMATOLOGY

## 2023-08-10 PROCEDURE — 11102 PR TANGENTIAL BIOPSY, SKIN, SINGLE LESION: ICD-10-PCS | Mod: S$GLB,,, | Performed by: DERMATOLOGY

## 2023-08-10 PROCEDURE — 99213 OFFICE O/P EST LOW 20 MIN: CPT | Mod: 25,S$GLB,, | Performed by: DERMATOLOGY

## 2023-08-10 PROCEDURE — 1159F MED LIST DOCD IN RCRD: CPT | Mod: CPTII,S$GLB,, | Performed by: DERMATOLOGY

## 2023-08-10 PROCEDURE — 88305 TISSUE EXAM BY PATHOLOGIST: CPT | Mod: 26,,, | Performed by: PATHOLOGY

## 2023-08-10 NOTE — PATIENT INSTRUCTIONS
Shave Biopsy Wound Care    Your doctor has performed a shave biopsy today.  A band aid and vaseline ointment has been placed over the site.  This should remain in place for 24 hours.  It is recommended that you keep the area dry for the first 24 hours.  After 24 hours, you may remove the band aid and wash the area with warm soap and water and apply Vaseline jelly.  Many patients prefer to use Neosporin or Bacitracin ointment.  This is acceptable; however, know that you can develop an allergy to this medication even if you have used it safely for years.  It is important to keep the area moist.  Letting it dry out and get air slows healing time, and will worsen the scar.  Band aid is optional after first 24 hours.      If you notice increasing redness, tenderness, pain, or yellow drainage at the biopsy site, please notify your doctor.  These are signs of an infection.    If your biopsy site is bleeding, apply firm pressure for 15 minutes straight.  Repeat for another 15 minutes, if it is still bleeding.   If the surgical site continues to bleed, then please contact your doctor.       [facility] - DERMATOLOGY  [address], [address]  [place] [address]  Dept: [phone]  Dept Fax: [phone]

## 2023-08-10 NOTE — PROGRESS NOTES
Subjective:      Patient ID:  Hollie Tsang is a 63 y.o. female who presents for   Chief Complaint   Patient presents with    Skin Check     UBSC     LOV 2/22/22 Androgenetic alopecia     Patient here today for UBSC.  C/O mole to right jaw line. Noticed growing x 1 year.    Derm Hx:  no Phx of NMSC.  no Fhx of melanoma.    Current Outpatient Medications:   ·  acyclovir (ZOVIRAX) 400 MG tablet, Take 1 tablet (400 mg total) by mouth 2 (two) times daily., Disp: 180 tablet, Rfl: 1  ·  ALPRAZolam (XANAX) 0.5 MG tablet, Take 1 tablet (0.5 mg total) by mouth 2 (two) times daily as needed for Anxiety., Disp: 180 tablet, Rfl: 1  ·  citalopram (CELEXA) 10 MG tablet, Take 1 tablet (10 mg total) by mouth once daily., Disp: 90 tablet, Rfl: 1  ·  [id number]-algal oil (METANX/FOLTANX RF) 3 mg-35 mg-2 mg -90.314 mg Cap, Take 1 tablet by mouth once daily., Disp: 30 capsule, Rfl: 6  ·  multivitamin capsule, Take 1 capsule by mouth once daily., Disp: , Rfl:   ·  prenatal vit,parish 74/iron/folic (PRENATAL VITAMIN 1+1 ORAL), Take by mouth., Disp: , Rfl:   ·  temazepam (RESTORIL) 15 mg Cap, Take 1 capsule (15 mg total) by mouth nightly as needed (insomnia)., Disp: 90 capsule, Rfl: 1  ·  biotin 1 mg tablet, Take 1,000 mcg by mouth 3 (three) times daily., Disp: , Rfl:   ·  ibuprofen (ADVIL,MOTRIN) 800 MG tablet, Take 1 tablet (800 mg total) by mouth every 6 (six) hours as needed for Pain., Disp: 20 tablet, Rfl: 0          Review of Systems   Constitutional:  Negative for fever, chills and fatigue.   HENT:  Negative for congestion, rhinorrhea and postnasal drip.    Eyes:  Negative for itching.   Skin:  Positive for daily sunscreen use (in makeup) and activity-related sunscreen use.   Allergic/Immunologic: Positive for environmental allergies.       Objective:   Physical Exam   Constitutional: She appears well-developed and well-nourished. No distress.   Neurological: She is alert and oriented to person, place, and time. She is not  disoriented.   Psychiatric: She has a normal mood and affect.   Skin:   Areas Examined (abnormalities noted in diagram):   Scalp / Hair Palpated and Inspected  Head / Face Inspection Performed  Neck Inspection Performed  Chest / Axilla Inspection Performed  Abdomen Inspection Performed  Back Inspection Performed  RUE Inspected  LUE Inspection Performed  Nails and Digits Inspection Performed                 Diagram Legend     Erythematous scaling macule/papule c/w actinic keratosis       Vascular papule c/w angioma      Pigmented verrucoid papule/plaque c/w seborrheic keratosis      Yellow umbilicated papule c/w sebaceous hyperplasia      Irregularly shaped tan macule c/w lentigo     1-2 mm smooth white papules consistent with Milia      Movable subcutaneous cyst with punctum c/w epidermal inclusion cyst      Subcutaneous movable cyst c/w pilar cyst      Firm pink to brown papule c/w dermatofibroma      Pedunculated fleshy papule(s) c/w skin tag(s)      Evenly pigmented macule c/w junctional nevus     Mildly variegated pigmented, slightly irregular-bordered macule c/w mildly atypical nevus      Flesh colored to evenly pigmented papule c/w intradermal nevus       Pink pearly papule/plaque c/w basal cell carcinoma      Erythematous hyperkeratotic cursted plaque c/w SCC      Surgical scar with no sign of skin cancer recurrence      Open and closed comedones      Inflammatory papules and pustules      Verrucoid papule consistent consistent with wart     Erythematous eczematous patches and plaques     Dystrophic onycholytic nail with subungual debris c/w onychomycosis     Umbilicated papule    Erythematous-base heme-crusted tan verrucoid plaque consistent with inflamed seborrheic keratosis     Erythematous Silvery Scaling Plaque c/w Psoriasis     See annotation        Assessment / Plan:      Pathology Orders:       Normal Orders This Visit    Specimen to Pathology, Dermatology     Questions:    Procedure Type: Dermatology  and skin neoplasms    Number of Specimens: 1    ------------------------: -------------------------    Spec 1 Procedure: Biopsy    Spec 1 Clinical Impression: BCC vs other    Spec 1 Source: midline upper abdomen    Release to patient:           Neoplasm of uncertain behavior of skin  -     Specimen to Pathology, Dermatology  Shave biopsy procedure note:    Shave biopsy performed after verbal consent including risk of infection, scar, recurrence, need for additional treatment of site. Area prepped with alcohol, anesthetized with approximately 1.0cc of 1% lidocaine with epinephrine. Lesional tissue shaved with razor blade. Hemostasis achieved with application of aluminum chloride followed by hyfrecation. No complications. Dressing applied. Wound care explained.    Seborrheic keratoses  These are benign inherited growths without a malignant potential. Reassurance given to patient. No treatment is necessary.     Multiple benign nevi  Careful dermoscopy evaluation of nevi performed with none identified as needing biopsy today  Monitor for new mole or moles that are becoming bigger, darker, irritated, or developing irregular borders.     Solar lentigo  This is a benign hyperpigmented sun induced lesion. Daily sun protection will reduce the number of new lesions. Treatment of these benign lesions are considered cosmetic.    Cherry angioma  This is a benign vascular lesion. Reassurance given. No treatment required.     Other skin changes due to chronic exposure to nonionizing radiation  Patient instructed in importance in daily broad spectrum sun protection of at least spf 30. Mineral sunscreen ingredients preferred (Zinc +/- Titanium) and can be found OTC.   Recommend Elta MD for daily use on face and neck.  Patient encouraged to wear hat for all outdoor exposure.   Also discussed sun avoidance and use of protective clothing.    Skin cancer screening  Upper body skin examination performed today including at least 9 points  as noted in physical examination. No lesions suspicious for malignancy noted.         Follow up in about 1 year (around 8/10/2024), or if symptoms worsen or fail to improve.

## 2023-08-18 ENCOUNTER — TELEPHONE (OUTPATIENT)
Dept: DERMATOLOGY | Facility: CLINIC | Age: 63
End: 2023-08-18

## 2023-08-18 ENCOUNTER — PATIENT MESSAGE (OUTPATIENT)
Dept: DERMATOLOGY | Facility: CLINIC | Age: 63
End: 2023-08-18

## 2023-08-18 ENCOUNTER — CLINICAL SUPPORT (OUTPATIENT)
Dept: DERMATOLOGY | Facility: CLINIC | Age: 63
End: 2023-08-18
Payer: COMMERCIAL

## 2023-08-18 DIAGNOSIS — T81.89XA NON-HEALING SURGICAL WOUND, INITIAL ENCOUNTER: Primary | ICD-10-CM

## 2023-08-18 LAB
FINAL PATHOLOGIC DIAGNOSIS: NORMAL
GROSS: NORMAL
Lab: NORMAL
MICROSCOPIC EXAM: NORMAL

## 2023-08-18 PROCEDURE — 99024 PR POST-OP FOLLOW-UP VISIT: ICD-10-PCS | Mod: S$GLB,,, | Performed by: DERMATOLOGY

## 2023-08-18 PROCEDURE — 99024 POSTOP FOLLOW-UP VISIT: CPT | Mod: S$GLB,,, | Performed by: DERMATOLOGY

## 2023-08-18 RX ORDER — MUPIROCIN 20 MG/G
OINTMENT TOPICAL
Qty: 22 G | Refills: 1 | Status: SHIPPED | OUTPATIENT
Start: 2023-08-18

## 2023-08-18 NOTE — PROGRESS NOTES
Wound examined  Fibrinogen base  No peripheral erythema or induration  Wound cleaned with hibiclens, dressed with mupirocin and non stick aide/ paper tape

## 2023-09-05 ENCOUNTER — TELEPHONE (OUTPATIENT)
Dept: CARDIOLOGY | Facility: CLINIC | Age: 63
End: 2023-09-05
Payer: COMMERCIAL

## 2023-09-06 ENCOUNTER — TELEPHONE (OUTPATIENT)
Dept: CARDIOLOGY | Facility: CLINIC | Age: 63
End: 2023-09-06

## 2023-09-06 NOTE — TELEPHONE ENCOUNTER
----- Message from Romie Kent sent at 9/6/2023  1:28 PM CDT -----  Type:  Patient Returning Call    Who Called:  pt    Who Left Message for Patient:  Luiza  Does the patient know what this is regarding?:  yes  Best Call Back Number:  [phone]     Additional Information:  please call and advise--thank you

## 2023-09-06 NOTE — TELEPHONE ENCOUNTER
----- Message from Maria Talavera sent at 9/5/2023 10:23 AM CDT -----  Type: Need Medical Advice   Who Called: Hernán  of patient  Best callback number: [phone]  Additional Information:  of patient called to schedule an appointment for this week, his wife has been having fluttering with her heart he do not want to see NP  Please call to further assist with scheduling, Thanks.

## 2023-09-06 NOTE — TELEPHONE ENCOUNTER
----- Message from Shasta Sweet sent at 9/6/2023  9:21 AM CDT -----  Contact: FLORIDALMA BROOKS    Type: Needs Medical Advice      Who Called:  FLORIDALMA BROOKS     Best Call Back Number: [phone] (home)       Additional Information: Patient is calling to speak with nurse/MA regarding message on yesterday for someone to give call back in regarding to his wife condition/ same day appt up ppt  Please call back and advise. Thanks

## 2023-11-13 RX ORDER — ACYCLOVIR 400 MG/1
400 TABLET ORAL 2 TIMES DAILY
Qty: 180 TABLET | Refills: 1 | Status: SHIPPED | OUTPATIENT
Start: 2023-11-13 | End: 2024-05-11

## 2023-11-26 DIAGNOSIS — F51.01 PRIMARY INSOMNIA: ICD-10-CM

## 2023-11-27 RX ORDER — TEMAZEPAM 15 MG/1
15 CAPSULE ORAL NIGHTLY PRN
Qty: 90 CAPSULE | Refills: 1 | Status: SHIPPED | OUTPATIENT
Start: 2023-11-27

## 2023-11-27 NOTE — TELEPHONE ENCOUNTER
Last OV 7/20/2023  Upcoming OV 2/1/2024  UDS 7/18/2022    Per  last dispensed 09/27/2023    Rx order set up.

## 2024-01-21 DIAGNOSIS — F41.9 ANXIETY: ICD-10-CM

## 2024-01-22 RX ORDER — ALPRAZOLAM 0.5 MG/1
0.5 TABLET ORAL 2 TIMES DAILY PRN
Qty: 180 TABLET | Refills: 1 | Status: SHIPPED | OUTPATIENT
Start: 2024-01-22 | End: 2024-02-01 | Stop reason: SDUPTHER

## 2024-02-01 ENCOUNTER — OFFICE VISIT (OUTPATIENT)
Dept: FAMILY MEDICINE | Facility: CLINIC | Age: 64
End: 2024-02-01
Payer: MEDICARE

## 2024-02-01 VITALS
WEIGHT: 98 LBS | HEART RATE: 66 BPM | DIASTOLIC BLOOD PRESSURE: 69 MMHG | OXYGEN SATURATION: 99 % | HEIGHT: 61 IN | BODY MASS INDEX: 18.5 KG/M2 | SYSTOLIC BLOOD PRESSURE: 118 MMHG | TEMPERATURE: 99 F

## 2024-02-01 DIAGNOSIS — F41.9 ANXIETY: Primary | ICD-10-CM

## 2024-02-01 DIAGNOSIS — Z85.3 HISTORY OF BREAST CANCER: ICD-10-CM

## 2024-02-01 DIAGNOSIS — F51.01 PRIMARY INSOMNIA: ICD-10-CM

## 2024-02-01 PROCEDURE — 99214 OFFICE O/P EST MOD 30 MIN: CPT | Mod: S$GLB,,, | Performed by: PHYSICIAN ASSISTANT

## 2024-02-01 RX ORDER — ASCORBIC ACID 1000 MG
40 TABLET ORAL DAILY
COMMUNITY

## 2024-02-01 RX ORDER — CITALOPRAM 20 MG/1
20 TABLET, FILM COATED ORAL DAILY
Qty: 90 TABLET | Refills: 1 | Status: SHIPPED | OUTPATIENT
Start: 2024-02-01 | End: 2024-07-30

## 2024-02-01 RX ORDER — ALPRAZOLAM 0.5 MG/1
0.5 TABLET ORAL NIGHTLY PRN
Qty: 30 TABLET | Refills: 2 | Status: SHIPPED | OUTPATIENT
Start: 2024-02-01 | End: 2024-05-01

## 2024-02-01 NOTE — PROGRESS NOTES
"  SUBJECTIVE:    Patient ID: Hollie Tsang is a 64 y.o. female.    Chief Complaint: Follow-up (No bottles/ refills needed/pt states she's been taking xanax for 10 years and possibly want to change / joint pain in left hand / pt refused flu shot)    This is a 63 yo female here for checkup. She is leaving for [place] on Wednesday, and welcoming a new grandson in July! She complains of "old age aches and pains," including occasion shooting pain in the left hand that causes her to drop what's in her hand, pain on 3rd digit DIP on right hand, left hip ischial bursitis (sees Dr. 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 She is concerned about how much xanax she takes daily (takes 1mg every night), she would like to decrease or stop benzo use.     Follow-up  Pertinent negatives include no arthralgias, chest pain, chills, congestion, coughing, fatigue or fever.       Office Visit on 08/10/2023   Component Date Value Ref Range Status    Final Pathologic Diagnosis 08/10/2023    Final                    Value:1. Skin, midline upper abdomen, shave biopsy:  - BASAL CELL CARCINOMA, SUPERFICIAL TYPE.  - MARGINS ARE NEGATIVE IN THE PLANES OF SECTION.         This lesion is skin cancer. You will be contacted regarding treatment.      Gross 08/10/2023    Final                    Value:Hospital/clinic label MRN:  [id number]  Pathology label MRN:  [id number]    The specimen is received in formalin labeled &quot;midline upper abdomen&quot;.  The specimen is a shave of tan-yellow skin measuring 1.9 x 1.0 x 0.2 cm. The specimen is inked black at the resection margin, serially sectioned, and submitted entirely in   cassettes [id number] and [id number].    Kimberly Steward S  Grossing Technologist      Microscopic Exam 08/10/2023 Sections show multiple foci of basaloid cells with peripheral palisading and focal retraction artifact, some of them in apoptosis, arising along the dermoepidermal junction and extending " into the papillary dermis.   Final    Disclaimer 08/10/2023 Unless the case is a 'gross only' or additional testing only, the final diagnosis for each specimen is based on a microscopic examination of appropriate tissue sections.   Final       Past Medical History:   Diagnosis Date    Anxiety     Breast cancer     Cancer     breast    Depression     Encounter for blood transfusion     History of gastroesophageal reflux (GERD)     Hx pulmonary embolism     Meningitis     Osteoporosis     PONV (postoperative nausea and vomiting)     PVC's (premature ventricular contractions)      Past Surgical History:   Procedure Laterality Date    AUGMENTATION OF BREAST      BREAST BIOPSY      BREAST RECONSTRUCTION      CHOLECYSTECTOMY      COLONOSCOPY  2009    HYSTERECTOMY      INSERTION OF BREAST IMPLANT Bilateral 11/5/2018    Procedure: INSERTION, BREAST IMPLANT;  Surgeon: Nedra Burden MD;  Location: [facility];  Service: Plastics;  Laterality: Bilateral;    MASTECTOMY Bilateral     reconstruction with tissue expanders    TISSUE EXPANDER REMOVAL Bilateral 11/5/2018    Procedure: REMOVAL, TISSUE EXPANDER;  Surgeon: Nedra Burden MD;  Location: [facility];  Service: Plastics;  Laterality: Bilateral;    TUBAL LIGATION       Family History   Problem Relation Age of Onset    Cancer Mother         breast    Heart disease Mother     Diabetes Mother     Heart disease Father     Stroke Father     Heart failure Maternal Aunt     Heart failure Maternal Uncle        Marital Status:   Alcohol History:  reports no history of alcohol use.  Tobacco History:  reports that she has never smoked. She has never used smokeless tobacco.  Drug History:  reports no history of drug use.    Review of patient's allergies indicates:   Allergen Reactions    Adhesive Rash    Fosamax [alendronate]      Bone erosion in jaw    Meperidine        Current Outpatient Medications:     acyclovir (ZOVIRAX) 400 MG tablet, Take 1 tablet (400 mg total) by mouth 2  "(two) times daily., Disp: 180 tablet, Rfl: 1    ginkgo biloba 40 mg Tab, Take 40 mg by mouth once daily., Disp: , Rfl:     mjyqbzsfd-[id number]-[id number]-algal oil (METANX/FOLTANX RF) 3 mg-35 mg-2 mg -90.314 mg Cap, Take 1 tablet by mouth once daily., Disp: 30 capsule, Rfl: 6    prenatal vit,parish 74/iron/folic (PRENATAL VITAMIN 1+1 ORAL), Take by mouth., Disp: , Rfl:     temazepam (RESTORIL) 15 mg Cap, Take 1 capsule (15 mg total) by mouth nightly as needed (insomnia)., Disp: 90 capsule, Rfl: 1    ALPRAZolam (XANAX) 0.5 MG tablet, Take 1 tablet (0.5 mg total) by mouth nightly as needed for Anxiety., Disp: 30 tablet, Rfl: 2    citalopram (CELEXA) 20 MG tablet, Take 1 tablet (20 mg total) by mouth once daily., Disp: 90 tablet, Rfl: 1    multivitamin capsule, Take 1 capsule by mouth once daily., Disp: , Rfl:     mupirocin (BACTROBAN) 2 % ointment, Apply to surgical site BID (Patient not taking: Reported on 2/1/2024), Disp: 22 g, Rfl: 1  No current facility-administered medications for this visit.    Facility-Administered Medications Ordered in Other Visits:     lactated ringers infusion, , Intravenous, Continuous, Charles Cheung MD    lidocaine (PF) 10 mg/ml (1%) injection 2 mg, 0.2 mL, Intradermal, Once, Charles Cheung MD    Review of Systems   Constitutional:  Positive for activity change (feels facial flushing). Negative for chills, fatigue and fever.   HENT:  Negative for congestion.    Respiratory:  Negative for cough, shortness of breath and wheezing.    Cardiovascular:  Negative for chest pain and palpitations.   Gastrointestinal:  Negative for abdominal distention.   Genitourinary:  Negative for difficulty urinating and dysuria.   Musculoskeletal:  Negative for arthralgias.          Objective:      Vitals:    02/01/24 1039   BP: 118/69   Pulse: 66   Temp: 98.5 °F (36.9 °C)   TempSrc: Oral   SpO2: 99%   Weight: 44.5 kg (98 lb)   Height: 5' 1" (1.549 m)     Physical Exam  Constitutional:       Appearance: Normal " appearance.   HENT:      Head: Normocephalic and atraumatic.   Eyes:      Extraocular Movements: Extraocular movements intact.      Pupils: Pupils are equal, round, and reactive to light.   Cardiovascular:      Rate and Rhythm: Normal rate and regular rhythm.      Pulses: Normal pulses.   Pulmonary:      Effort: Pulmonary effort is normal.      Breath sounds: Normal breath sounds.   Abdominal:      General: Abdomen is flat.      Palpations: Abdomen is soft.   Skin:     General: Skin is warm and dry.      Capillary Refill: Capillary refill takes less than 2 seconds.   Neurological:      Mental Status: She is alert.           Assessment:       1. Anxiety    2. Primary insomnia    3. History of breast cancer         Plan:       Anxiety  Comments:  Agreeable to tapering alprazolam dose.  Reduced to 0.5 mg nightly 1st month and then 0.25 mg 2nd month.  Q.o.d. after that and then stop  Orders:  -     ALPRAZolam (XANAX) 0.5 MG tablet; Take 1 tablet (0.5 mg total) by mouth nightly as needed for Anxiety.  Dispense: 30 tablet; Refill: 2  -     citalopram (CELEXA) 20 MG tablet; Take 1 tablet (20 mg total) by mouth once daily.  Dispense: 90 tablet; Refill: 1    Primary insomnia  Comments:  add melatonin 5mg nightly while alprazolam being tapered.    History of breast cancer  Comments:  still follows with oncology. keep appt as scheduled.      Follow up in about 6 months (around 8/1/2024).        2/1/2024 Tommie Rojas PA-C

## 2024-02-21 DIAGNOSIS — J32.9 SINUSITIS, UNSPECIFIED CHRONICITY, UNSPECIFIED LOCATION: Primary | ICD-10-CM

## 2024-02-21 RX ORDER — AZITHROMYCIN 250 MG/1
TABLET, FILM COATED ORAL
Qty: 6 TABLET | Refills: 0 | Status: SHIPPED | OUTPATIENT
Start: 2024-02-21 | End: 2024-02-26

## 2024-02-21 NOTE — TELEPHONE ENCOUNTER
----- Message from Surekha Ruiz sent at 2/21/2024  9:59 AM CST -----  - 9:28- pt would like something called in for congestion, no taste or smell. Covid test at home was negative   [phone]

## 2024-02-21 NOTE — TELEPHONE ENCOUNTER
Spoke to patient, symptoms started a week ago, when puts head back feels the drainage in throat. Still just clear. Patient sounds very nasally congested. Lost smell. Will get another home covid test today.   Advised to get mucinex and saline nasal rinse today, increase oral fluids   got a zpack and tessalon from dr. Orozco last week called in and he's doing better  Zpack for her?

## 2024-02-23 ENCOUNTER — TELEPHONE (OUTPATIENT)
Dept: DERMATOLOGY | Facility: CLINIC | Age: 64
End: 2024-02-23
Payer: MEDICARE

## 2024-02-23 NOTE — TELEPHONE ENCOUNTER
----- Message from Angelina Herrera sent at 2/23/2024 11:28 AM CST -----  Contact: Self  Type:  Sooner Appointment Request    Caller is requesting a sooner appointment.  Caller declined first available appointment listed below.  Caller will not accept being placed on the waitlist and is requesting a message be sent to doctor.    Name of Caller:  Patient  When is the first available appointment?  08/29  Symptoms:  Raised dry, scab like mole that keeps changing /Skin check  Would the patient rather a call back or a response via [facility]? Call  Best Call Back Number:  [phone]  Additional Information:  Thank You

## 2024-02-23 NOTE — TELEPHONE ENCOUNTER
----- Message from Elise Barber sent at 2/23/2024 11:38 AM CST -----  Regarding: call back  Contact: patient  Type: Needs Medical Advice  Who Called:  patient   Symptoms (please be specific):  schedule apt   How long has patient had these symptoms:    Pharmacy name and phone #:    Best Call Back Number: [phone]    Additional Information:   lizandro nuñez pt is returning ur call are u available MRN: [id number] elliott munoz

## 2024-04-04 DIAGNOSIS — F51.01 PRIMARY INSOMNIA: ICD-10-CM

## 2024-04-05 RX ORDER — TEMAZEPAM 15 MG/1
15 CAPSULE ORAL NIGHTLY PRN
Qty: 90 CAPSULE | Refills: 1 | Status: SHIPPED | OUTPATIENT
Start: 2024-04-05 | End: 2024-05-09 | Stop reason: SDUPTHER

## 2024-05-09 DIAGNOSIS — F51.01 PRIMARY INSOMNIA: ICD-10-CM

## 2024-05-09 RX ORDER — TEMAZEPAM 15 MG/1
15 CAPSULE ORAL NIGHTLY PRN
Qty: 90 CAPSULE | Refills: 1 | Status: SHIPPED | OUTPATIENT
Start: 2024-05-09

## 2024-05-10 ENCOUNTER — TELEPHONE (OUTPATIENT)
Dept: FAMILY MEDICINE | Facility: CLINIC | Age: 64
End: 2024-05-10
Payer: MEDICARE

## 2024-05-10 NOTE — TELEPHONE ENCOUNTER
----- Message from Michelle Raygoza sent at 5/10/2024 10:28 AM CDT -----  Pt would like to speak with aissatou concerning temazepam that pharmacy is having trouble with. Pharmacy stating provider needs to contact insurance company.   [phone]

## 2024-07-08 DIAGNOSIS — H10.9 CONJUNCTIVITIS, UNSPECIFIED CONJUNCTIVITIS TYPE, UNSPECIFIED LATERALITY: Primary | ICD-10-CM

## 2024-07-08 RX ORDER — POLYMYXIN B SULFATE AND TRIMETHOPRIM 1; 10000 MG/ML; [USP'U]/ML
1 SOLUTION OPHTHALMIC EVERY 4 HOURS
Qty: 10 ML | Refills: 0 | Status: SHIPPED | OUTPATIENT
Start: 2024-07-08

## 2024-07-08 NOTE — TELEPHONE ENCOUNTER
----- Message from Surekha Ruiz sent at 7/8/2024 12:10 PM CDT -----  - 11:58-pt has pink eye. Her  had some drops but is out now and would like more.   [phone]

## 2024-07-09 ENCOUNTER — TELEPHONE (OUTPATIENT)
Dept: FAMILY MEDICINE | Facility: CLINIC | Age: 64
End: 2024-07-09
Payer: MEDICARE

## 2024-07-09 NOTE — TELEPHONE ENCOUNTER
----- Message from Michelle Raygoza sent at 7/9/2024  3:38 PM CDT -----  Pt had blood work done for dermatologist and dermatologist would like for Jayden to look at the results.   [phone]

## 2024-07-10 DIAGNOSIS — Z78.0 MENOPAUSE: ICD-10-CM

## 2024-07-12 ENCOUNTER — TELEPHONE (OUTPATIENT)
Dept: FAMILY MEDICINE | Facility: CLINIC | Age: 64
End: 2024-07-12
Payer: MEDICARE

## 2024-07-12 ENCOUNTER — HOSPITAL ENCOUNTER (OUTPATIENT)
Dept: RADIOLOGY | Facility: CLINIC | Age: 64
Discharge: HOME | End: 2024-07-12
Attending: PHYSICIAN ASSISTANT
Payer: MEDICARE

## 2024-07-12 DIAGNOSIS — Z78.0 MENOPAUSE: ICD-10-CM

## 2024-07-12 DIAGNOSIS — R76.8 POSITIVE ANA (ANTINUCLEAR ANTIBODY): Primary | ICD-10-CM

## 2024-07-12 NOTE — TELEPHONE ENCOUNTER
I ordered an SHAYAN reflex to [facility] to further track this down. Will refer to rheumatology pending results

## 2024-07-12 NOTE — TELEPHONE ENCOUNTER
----- Message from Mackenzie Ramirez sent at 7/12/2024 11:19 AM CDT -----  The patients  Sharan called.The patient went to the dermatologist. They did labs. The patient needs Tommie to look  at the results. Her  Thyroid was abnormal. Please call today. She is very concern. Sharan's # [phone]

## 2024-07-15 ENCOUNTER — PATIENT MESSAGE (OUTPATIENT)
Dept: ADMINISTRATIVE | Facility: HOSPITAL | Age: 64
End: 2024-07-15
Payer: MEDICARE

## 2024-07-16 ENCOUNTER — HOSPITAL ENCOUNTER (OUTPATIENT)
Dept: RADIOLOGY | Facility: CLINIC | Age: 64
Discharge: HOME | End: 2024-07-16
Attending: PHYSICIAN ASSISTANT
Payer: MEDICARE

## 2024-07-16 PROCEDURE — 77080 DXA BONE DENSITY AXIAL: CPT | Mod: TC,PO

## 2024-07-16 PROCEDURE — 77080 DXA BONE DENSITY AXIAL: CPT | Mod: 26,,, | Performed by: RADIOLOGY

## 2024-07-18 ENCOUNTER — TELEPHONE (OUTPATIENT)
Dept: FAMILY MEDICINE | Facility: CLINIC | Age: 64
End: 2024-07-18
Payer: MEDICARE

## 2024-07-18 DIAGNOSIS — E04.2 MULTIPLE THYROID NODULES: Primary | ICD-10-CM

## 2024-07-18 NOTE — TELEPHONE ENCOUNTER
Spoke with patient who states she has taken this in the past when she had osteopenia and it gave her jaw issues. She is taking Viactiv OTC     Also wants to know if nagi will order a thyroid ultrasound, states they normally follow up on her thyroid nodules. Aware nagi is out and we will get this message to him on Monday.

## 2024-07-18 NOTE — TELEPHONE ENCOUNTER
Thyroid is slightly overactive. I would like to recheck TSH in 4 weeks.previously has been normal. Before we refer lets confirm TSH is low again.

## 2024-07-18 NOTE — TELEPHONE ENCOUNTER
----- Message from Tommie Rojas PA-C sent at 7/16/2024  5:06 PM CDT -----  She has osteoporosis of the left hip and spine.  Would recommend starting alendronate 70 mg once weekly.  Should be taken with full glass of water on empty stomach sitting up for 45 minutes.  Would plan to recheck DEXA scan in 2 year

## 2024-07-22 NOTE — TELEPHONE ENCOUNTER
Ok for thyroid u/s orders and if she is refusing alendronate, its fine to take the otc supplement that she mentioned. Light weight bearing exercise daily recommended

## 2024-07-23 LAB — ANA SER QL: NEGATIVE

## 2024-07-24 ENCOUNTER — TELEPHONE (OUTPATIENT)
Dept: FAMILY MEDICINE | Facility: CLINIC | Age: 64
End: 2024-07-24
Payer: MEDICARE

## 2024-07-24 ENCOUNTER — PATIENT MESSAGE (OUTPATIENT)
Dept: FAMILY MEDICINE | Facility: CLINIC | Age: 64
End: 2024-07-24
Payer: MEDICARE

## 2024-07-24 DIAGNOSIS — R11.0 NAUSEA: Primary | ICD-10-CM

## 2024-07-24 DIAGNOSIS — U07.1 COVID-19: ICD-10-CM

## 2024-07-24 NOTE — TELEPHONE ENCOUNTER
----- Message from Mackenzie Ramirez sent at 7/24/2024 11:27 AM CDT -----  The patient went to [facility]. She does have Covid. She started feeling sick on Tuesday. The Urgent Care told her she can test on Sunday. If she test negative on Sunday When can she be around people?  Her grand baby is going to be born any day. Pt's # [phone] GH

## 2024-07-24 NOTE — TELEPHONE ENCOUNTER
----- Message from Mackenzie Ramirez sent at 7/24/2024  8:34 AM CDT -----  The patient wants to be seen today. She has chills,fever,sore throat,and a cough. She has a grand baby due any day now. Pt's # [phone]

## 2024-07-24 NOTE — TELEPHONE ENCOUNTER
There is no specific time range now for covid. If she is asymptomatic and afebrile for 24 hours she is cleared to be around people

## 2024-07-25 RX ORDER — ONDANSETRON 4 MG/1
4 TABLET, FILM COATED ORAL EVERY 8 HOURS PRN
Qty: 20 TABLET | Refills: 0 | Status: SHIPPED | OUTPATIENT
Start: 2024-07-25

## 2024-08-01 DIAGNOSIS — F51.01 PRIMARY INSOMNIA: ICD-10-CM

## 2024-08-01 RX ORDER — TEMAZEPAM 15 MG/1
15 CAPSULE ORAL NIGHTLY PRN
Qty: 90 CAPSULE | Refills: 1 | Status: SHIPPED | OUTPATIENT
Start: 2024-08-01

## 2024-08-05 ENCOUNTER — HOSPITAL ENCOUNTER (OUTPATIENT)
Dept: RADIOLOGY | Facility: HOSPITAL | Age: 64
Discharge: HOME | End: 2024-08-05
Attending: PHYSICIAN ASSISTANT
Payer: MEDICARE

## 2024-08-05 DIAGNOSIS — E04.2 MULTIPLE THYROID NODULES: ICD-10-CM

## 2024-08-05 PROCEDURE — 76536 US EXAM OF HEAD AND NECK: CPT | Mod: 26,,, | Performed by: RADIOLOGY

## 2024-08-05 PROCEDURE — 76536 US EXAM OF HEAD AND NECK: CPT | Mod: TC,PO

## 2024-08-06 ENCOUNTER — TELEPHONE (OUTPATIENT)
Dept: FAMILY MEDICINE | Facility: CLINIC | Age: 64
End: 2024-08-06
Payer: MEDICARE

## 2024-08-06 DIAGNOSIS — E04.2 MULTIPLE THYROID NODULES: Primary | ICD-10-CM

## 2024-08-09 DIAGNOSIS — F41.9 ANXIETY: ICD-10-CM

## 2024-08-09 RX ORDER — CITALOPRAM 20 MG/1
20 TABLET, FILM COATED ORAL DAILY
Qty: 30 TABLET | Refills: 0 | Status: SHIPPED | OUTPATIENT
Start: 2024-08-09 | End: 2024-09-08

## 2024-08-13 ENCOUNTER — OFFICE VISIT (OUTPATIENT)
Dept: FAMILY MEDICINE | Facility: CLINIC | Age: 64
End: 2024-08-13
Payer: MEDICARE

## 2024-08-13 VITALS
OXYGEN SATURATION: 97 % | DIASTOLIC BLOOD PRESSURE: 56 MMHG | WEIGHT: 100 LBS | HEIGHT: 61 IN | HEART RATE: 89 BPM | BODY MASS INDEX: 18.88 KG/M2 | RESPIRATION RATE: 18 BRPM | SYSTOLIC BLOOD PRESSURE: 124 MMHG

## 2024-08-13 DIAGNOSIS — F41.9 ANXIETY: ICD-10-CM

## 2024-08-13 DIAGNOSIS — F51.01 PRIMARY INSOMNIA: ICD-10-CM

## 2024-08-13 DIAGNOSIS — E04.2 MULTIPLE THYROID NODULES: Primary | ICD-10-CM

## 2024-08-13 PROCEDURE — 99214 OFFICE O/P EST MOD 30 MIN: CPT | Mod: S$GLB,,, | Performed by: PHYSICIAN ASSISTANT

## 2024-08-13 RX ORDER — TEMAZEPAM 15 MG/1
15 CAPSULE ORAL NIGHTLY PRN
Qty: 90 CAPSULE | Refills: 1 | Status: SHIPPED | OUTPATIENT
Start: 2024-08-13

## 2024-08-13 RX ORDER — CITALOPRAM 20 MG/1
20 TABLET, FILM COATED ORAL DAILY
Qty: 90 TABLET | Refills: 1 | Status: SHIPPED | OUTPATIENT
Start: 2024-08-13 | End: 2025-02-09

## 2024-08-13 RX ORDER — ACYCLOVIR 400 MG/1
400 TABLET ORAL 2 TIMES DAILY
Qty: 180 TABLET | Refills: 1 | Status: SHIPPED | OUTPATIENT
Start: 2024-08-13 | End: 2025-02-09

## 2024-08-13 NOTE — PROGRESS NOTES
SUBJECTIVE:    Patient ID: Hollie Tsang is a 64 y.o. female.    Chief Complaint: Follow-up (No bottles// refills needed// lab results//pt states she have no complaints today )    This is a 64-year-old female who presents today for regular follow-up.  Reports that she is doing well overall.  No new concerns or complaints at this time.  Did just complete a follow-up ultrasound of the thyroid.  One of the dominant nodules of the goiter had a slight increase in size.  Recommendation was to refer for fine-needle aspiration. She is enjoying her new grandson.     Follow-up  Pertinent negatives include no arthralgias, chest pain, headaches, joint swelling, neck pain, vomiting or weakness.       Telephone on 07/12/2024   Component Date Value Ref Range Status    SHAYAN Direct 07/22/2024 Negative  Negative Final   Office Visit on 06/26/2024   Component Date Value Ref Range Status    WBC 07/01/2024 5.4  3.8 - 10.8 Thousand/uL Final    RBC 07/01/2024 4.38  3.80 - 5.10 Million/uL Final    Hemoglobin 07/01/2024 13.1  11.7 - 15.5 g/dL Final    Hematocrit 07/01/2024 41.6  35.0 - 45.0 % Final    MCV 07/01/2024 95.0  80.0 - 100.0 fL Final    MCH 07/01/2024 29.9  27.0 - 33.0 pg Final    MCHC 07/01/2024 31.5 (L)  32.0 - 36.0 g/dL Final    RDW 07/01/2024 12.7  11.0 - 15.0 % Final    Platelets 07/01/2024 260  140 - 400 Thousand/uL Final    MPV 07/01/2024 9.2  7.5 - 12.5 fL Final    Neutrophils, Abs 07/01/2024 3,483  1,500 - 7,800 cells/uL Final    Lymph # 07/01/2024 1,361  850 - 3,900 cells/uL Final    Mono # 07/01/2024 427  200 - 950 cells/uL Final    Eos # 07/01/2024 70  15 - 500 cells/uL Final    Baso # 07/01/2024 59  0 - 200 cells/uL Final    Neutrophils Relative 07/01/2024 64.5  % Final    Lymph % 07/01/2024 25.2  % Final    Mono % 07/01/2024 7.9  % Final    Eosinophil % 07/01/2024 1.3  % Final    Basophil % 07/01/2024 1.1  % Final    Glucose 07/01/2024 88  65 - 139 mg/dL Final    BUN 07/01/2024 21  7 - 25 mg/dL Final    Creatinine  07/01/2024 0.70  0.50 - 1.05 mg/dL Final    eGFR 07/01/2024 96  > OR = 60 mL/min/1.73m2 Final    BUN/Creatinine Ratio 07/01/2024 SEE NOTE:  6 - 22 (calc) Final    Sodium 07/01/2024 139  135 - 146 mmol/L Final    Potassium 07/01/2024 3.9  3.5 - 5.3 mmol/L Final    Chloride 07/01/2024 104  98 - 110 mmol/L Final    CO2 07/01/2024 29  20 - 32 mmol/L Final    Calcium 07/01/2024 9.3  8.6 - 10.4 mg/dL Final    Total Protein 07/01/2024 6.6  6.1 - 8.1 g/dL Final    Albumin 07/01/2024 4.3  3.6 - 5.1 g/dL Final    Globulin, Total 07/01/2024 2.3  1.9 - 3.7 g/dL (calc) Final    Albumin/Globulin Ratio 07/01/2024 1.9  1.0 - 2.5 (calc) Final    Total Bilirubin 07/01/2024 0.3  0.2 - 1.2 mg/dL Final    Alkaline Phosphatase 07/01/2024 61  37 - 153 U/L Final    AST 07/01/2024 22  10 - 35 U/L Final    ALT 07/01/2024 20  6 - 29 U/L Final    TSH 07/01/2024 0.18 (L)  0.40 - 4.50 mIU/L Final    T4, Free 07/01/2024 0.9  0.8 - 1.8 ng/dL Final    Zinc 07/01/2024 57 (L)  60 - 130 mcg/dL Final    SHAYAN 07/01/2024 POSITIVE (A)  NEGATIVE Final    SHAYAN Titer 07/01/2024 1:40 (H)  titer Final    SHAYAN Pattern 07/01/2024 Nuclear, Speckled (A)   Final    SHAYAN TITER 07/01/2024 1:40 (H)  titer Final    SHAYAN Pattern 07/01/2024 Cytoplasmic (A)   Final    Iron 07/01/2024 59  45 - 160 mcg/dL Final    TIBC 07/01/2024 279  250 - 450 mcg/dL (calc) Final    Iron Saturation 07/01/2024 21  16 - 45 % (calc) Final    Ferritin 07/01/2024 81  16 - 288 ng/mL Final    Vitamin D, 1,25 (OH)2 07/01/2024 45  18 - 72 pg/mL Final    Vitamin D3, 1,25 (OH)2 07/01/2024 45  pg/mL Final    Vitamin D2, 1,25 (OH)2 07/01/2024 <8  pg/mL Final       Past Medical History:   Diagnosis Date    Anxiety     Breast cancer     Cancer     breast    Depression     Encounter for blood transfusion     History of gastroesophageal reflux (GERD)     Hx pulmonary embolism     Meningitis     Osteoporosis     PONV (postoperative nausea and vomiting)     PVC's (premature ventricular contractions)      Past  Surgical History:   Procedure Laterality Date    AUGMENTATION OF BREAST      BREAST BIOPSY      BREAST RECONSTRUCTION      CHOLECYSTECTOMY      COLONOSCOPY  2009    HYSTERECTOMY      INSERTION OF BREAST IMPLANT Bilateral 11/5/2018    Procedure: INSERTION, BREAST IMPLANT;  Surgeon: Nedra Burden MD;  Location: [facility];  Service: Plastics;  Laterality: Bilateral;    MASTECTOMY Bilateral     reconstruction with tissue expanders    TISSUE EXPANDER REMOVAL Bilateral 11/5/2018    Procedure: REMOVAL, TISSUE EXPANDER;  Surgeon: Nedra Burden MD;  Location: [facility];  Service: Plastics;  Laterality: Bilateral;    TUBAL LIGATION       Family History   Problem Relation Name Age of Onset    Cancer Mother          breast    Heart disease Mother      Diabetes Mother      Heart disease Father      Stroke Father      Heart failure Maternal Aunt      Heart failure Maternal Uncle         Marital Status:   Alcohol History:  reports no history of alcohol use.  Tobacco History:  reports that she has never smoked. She has never used smokeless tobacco.  Drug History:  reports no history of drug use.    Review of patient's allergies indicates:   Allergen Reactions    Adhesive Rash    Fosamax [alendronate]      Bone erosion in jaw    Meperidine        Current Outpatient Medications:     ginkgo biloba 40 mg Tab, Take 40 mg by mouth once daily., Disp: , Rfl:     djxsilojk-E5-oyG06-algal oil (METANX/FOLTANX RF) 3 mg-35 mg-2 mg -90.314 mg Cap, Take 1 tablet by mouth once daily., Disp: 30 capsule, Rfl: 6    minoxidiL (LONITEN) 2.5 MG tablet, TAKE 1/2 TABLET (1.25 MG TOTAL) BY MOUTH ONCE DAILY., Disp: 45 tablet, Rfl: 1    multivitamin capsule, Take 1 capsule by mouth once daily., Disp: , Rfl:     acyclovir (ZOVIRAX) 400 MG tablet, Take 1 tablet (400 mg total) by mouth 2 (two) times daily., Disp: 180 tablet, Rfl: 1    citalopram (CELEXA) 20 MG tablet, Take 1 tablet (20 mg total) by mouth once daily., Disp: 90 tablet, Rfl: 1     "temazepam (RESTORIL) 15 mg Cap, Take 1 capsule (15 mg total) by mouth nightly as needed (insomnia)., Disp: 90 capsule, Rfl: 1  No current facility-administered medications for this visit.    Facility-Administered Medications Ordered in Other Visits:     lactated ringers infusion, , Intravenous, Continuous, Charles Cheung MD    lidocaine (PF) 10 mg/ml (1%) injection 2 mg, 0.2 mL, Intradermal, Once, Charles Cheung MD    Review of Systems   Constitutional:  Negative for activity change and unexpected weight change.   HENT:  Negative for hearing loss, rhinorrhea and trouble swallowing.    Eyes:  Negative for discharge and visual disturbance.   Respiratory:  Negative for chest tightness and wheezing.    Cardiovascular:  Negative for chest pain and palpitations.   Gastrointestinal:  Negative for blood in stool, constipation, diarrhea and vomiting.   Endocrine: Negative for polydipsia and polyuria.   Genitourinary:  Negative for difficulty urinating, dysuria, hematuria and menstrual problem.   Musculoskeletal:  Negative for arthralgias, joint swelling and neck pain.   Neurological:  Negative for weakness and headaches.   Psychiatric/Behavioral:  Negative for confusion and dysphoric mood.           Objective:      Vitals:    08/13/24 1527   BP: (!) 124/56   Pulse: 89   Resp: 18   SpO2: 97%   Weight: 45.4 kg (100 lb)   Height: 5' 1" (1.549 m)     Physical Exam  Constitutional:       Appearance: Normal appearance.   HENT:      Head: Normocephalic and atraumatic.   Eyes:      Extraocular Movements: Extraocular movements intact.      Pupils: Pupils are equal, round, and reactive to light.   Cardiovascular:      Rate and Rhythm: Normal rate and regular rhythm.      Pulses: Normal pulses.   Pulmonary:      Effort: Pulmonary effort is normal.      Breath sounds: Normal breath sounds.   Abdominal:      General: Abdomen is flat.      Palpations: Abdomen is soft.   Skin:     General: Skin is warm and dry.      Capillary " Refill: Capillary refill takes less than 2 seconds.   Neurological:      Mental Status: She is alert.           Assessment:       1. Multiple thyroid nodules    2. Primary insomnia    3. Anxiety         Plan:       Multiple thyroid nodules  Comments:  one more predominant than others. planning for FNA with [facility].    Primary insomnia  Comments:  refills as needed.  Orders:  -     temazepam (RESTORIL) 15 mg Cap; Take 1 capsule (15 mg total) by mouth nightly as needed (insomnia).  Dispense: 90 capsule; Refill: 1    Anxiety  Comments:  Doing well. Takes at night for anxiety.  Orders:  -     citalopram (CELEXA) 20 MG tablet; Take 1 tablet (20 mg total) by mouth once daily.  Dispense: 90 tablet; Refill: 1    Other orders  -     acyclovir (ZOVIRAX) 400 MG tablet; Take 1 tablet (400 mg total) by mouth 2 (two) times daily.  Dispense: 180 tablet; Refill: 1      Follow up in about 6 months (around 2/13/2025).        8/13/2024 Tommie Rojas PA-C

## 2024-09-19 ENCOUNTER — OFFICE VISIT (OUTPATIENT)
Dept: SURGERY | Facility: CLINIC | Age: 64
End: 2024-09-19
Payer: MEDICARE

## 2024-09-19 VITALS — SYSTOLIC BLOOD PRESSURE: 111 MMHG | HEART RATE: 85 BPM | TEMPERATURE: 99 F | DIASTOLIC BLOOD PRESSURE: 68 MMHG

## 2024-09-19 DIAGNOSIS — E04.2 MULTIPLE THYROID NODULES: Primary | ICD-10-CM

## 2024-09-19 PROCEDURE — 99999 PR PBB SHADOW E&M-EST. PATIENT-LVL III: CPT | Mod: PBBFAC,,, | Performed by: SURGERY

## 2024-09-19 PROCEDURE — 99204 OFFICE O/P NEW MOD 45 MIN: CPT | Mod: S$PBB,,, | Performed by: SURGERY

## 2024-09-19 PROCEDURE — 99213 OFFICE O/P EST LOW 20 MIN: CPT | Mod: PBBFAC,PN | Performed by: SURGERY

## 2024-09-19 NOTE — PROGRESS NOTES
Subjective:       Patient ID: Hollie Tsang is a 64 y.o. female.    Chief Complaint: Other (Thyroid nodules)      HPI:  64 year old female referred to the office with multiple thyroid nodules. She has been fololowed for nodules since 2018.  Ultrasound shows thyroid gland is larger on the right at 6 cm versus 4 cm to the left.  Largest nodule is 2.4 cm in the right lobe.  It was previously 2.2 cm going back to 2020.  It has never been biopsied in the past.  Largest nodule on the left is 10 mm.  She has no compressive symptoms.  No history of hyper or hypothyroidism.  No history of radiation to the neck.  She does have history of triple negative breast cancer diagnosed in 2018.  She had bilateral mastectomies and chemotherapy.      History of PE with her bilateral mastectomy. Was on eliquis for 6 months. Now off for five years.     Past Medical History:   Diagnosis Date    Anxiety     Breast cancer     Cancer     breast    Depression     Encounter for blood transfusion     History of gastroesophageal reflux (GERD)     Hx pulmonary embolism     Meningitis     Osteoporosis     PONV (postoperative nausea and vomiting)     PVC's (premature ventricular contractions)      Past Surgical History:   Procedure Laterality Date    AUGMENTATION OF BREAST      BREAST BIOPSY      BREAST RECONSTRUCTION      CHOLECYSTECTOMY      COLONOSCOPY  2009    HYSTERECTOMY      INSERTION OF BREAST IMPLANT Bilateral 11/5/2018    Procedure: INSERTION, BREAST IMPLANT;  Surgeon: Nedra Burden MD;  Location: [facility];  Service: Plastics;  Laterality: Bilateral;    MASTECTOMY Bilateral     reconstruction with tissue expanders    TISSUE EXPANDER REMOVAL Bilateral 11/5/2018    Procedure: REMOVAL, TISSUE EXPANDER;  Surgeon: Nedra Burden MD;  Location: [facility];  Service: Plastics;  Laterality: Bilateral;    TUBAL LIGATION       Review of patient's allergies indicates:   Allergen Reactions    Adhesive Rash    Fosamax [alendronate]      Bone erosion  in jaw    Meperidine      Medication List with Changes/Refills   Current Medications    ACYCLOVIR (ZOVIRAX) 400 MG TABLET    Take 1 tablet (400 mg total) by mouth 2 (two) times daily.    CITALOPRAM (CELEXA) 20 MG TABLET    Take 1 tablet (20 mg total) by mouth once daily.    GINKGO BILOBA 40 MG TAB    Take 40 mg by mouth once daily.    IETSDKEXI-J3-VQX44-ALGAL OIL (METANX/FOLTANX RF) 3 MG-35 MG-2 MG -90.314 MG CAP    Take 1 tablet by mouth once daily.    MINOXIDIL (LONITEN) 2.5 MG TABLET    Take 1 tablet (2.5 mg total) by mouth once daily.    MULTIVITAMIN CAPSULE    Take 1 capsule by mouth once daily.    TEMAZEPAM (RESTORIL) 15 MG CAP    Take 1 capsule (15 mg total) by mouth nightly as needed (insomnia).     Family History   Problem Relation Name Age of Onset    Cancer Mother          breast    Heart disease Mother      Diabetes Mother      Heart disease Father      Stroke Father      Heart failure Maternal Aunt      Heart failure Maternal Uncle       Social History     Socioeconomic History    Marital status:    Tobacco Use    Smoking status: Never    Smokeless tobacco: Never   Substance and Sexual Activity    Alcohol use: No    Drug use: No    Sexual activity: Yes     Partners: Male     Social Determinants of Health     Financial Resource Strain: Low Risk  (8/5/2024)    Overall Financial Resource Strain (CARDIA)     Difficulty of Paying Living Expenses: Not hard at all   Food Insecurity: No Food Insecurity (8/5/2024)    Hunger Vital Sign     Worried About Running Out of Food in the Last Year: Never true     Ran Out of Food in the Last Year: Never true   Transportation Needs: No Transportation Needs (8/7/2023)    PRAPARE - Transportation     Lack of Transportation (Medical): No     Lack of Transportation (Non-Medical): No   Physical Activity: Insufficiently Active (8/5/2024)    Exercise Vital Sign     Days of Exercise per Week: 1 day     Minutes of Exercise per Session: 20 min   Stress: No Stress Concern  Present (8/5/2024)    [facility] [employer] [facility] [employer] - Occupational Stress Questionnaire     Feeling of Stress : Only a little   Housing Stability: Low Risk  (8/7/2023)    Housing Stability Vital Sign     Unable to Pay for Housing in the Last Year: No     Number of Places Lived in the Last Year: 1     Unstable Housing in the Last Year: No         Review of Systems   Constitutional:  Negative for appetite change, chills, fever and unexpected weight change.   HENT:  Negative for hearing loss, rhinorrhea, sore throat and voice change.    Eyes:  Negative for photophobia and visual disturbance.   Respiratory:  Negative for cough, choking and shortness of breath.    Cardiovascular:  Negative for chest pain, palpitations and leg swelling.   Gastrointestinal:  Negative for abdominal pain, blood in stool, constipation, diarrhea, nausea and vomiting.   Endocrine: Negative for cold intolerance, heat intolerance, polydipsia and polyuria.   Musculoskeletal:  Negative for arthralgias, back pain, joint swelling and neck stiffness.   Skin:  Negative for color change, pallor and rash.   Neurological:  Negative for dizziness, seizures, syncope and headaches.   Hematological:  Negative for adenopathy. Does not bruise/bleed easily.   Psychiatric/Behavioral:  Negative for agitation, behavioral problems and confusion.        Objective:      Physical Exam  Constitutional:       General: She is awake. She is not in acute distress.     Appearance: Normal appearance. She is well-developed. She is not toxic-appearing.   Neck:      Thyroid: Thyroid tenderness present. No thyroid mass or thyromegaly.      Comments: Mild tenderness with palpation of the right thyroid lobe.  Pulmonary:      Effort: No tachypnea, bradypnea or respiratory distress.   Neurological:      Mental Status: She is alert and oriented to person, place, and time.   Psychiatric:         Behavior: Behavior is cooperative.         Assessment/Plan:   Multiple thyroid  nodules  -     Ambulatory referral/consult to General Surgery  -     US FNA Thyroid, 1st Lesion; Future; Expected date: 09/19/2024    Patient with 2.4 cm right thyroid nodule among several smaller nodules on the left and the right.  Will refer to Radiology for image guided FNA of the nodule.  She will return to the office after FNA results return.  We will proceed accordingly.

## 2024-09-20 ENCOUNTER — TELEPHONE (OUTPATIENT)
Dept: RADIOLOGY | Facility: HOSPITAL | Age: 64
End: 2024-09-20

## 2024-09-20 RX ORDER — ACETAMINOPHEN, DIPHENHYDRAMINE HCL, PHENYLEPHRINE HCL 325; 25; 5 MG/1; MG/1; MG/1
10 TABLET ORAL NIGHTLY PRN
COMMUNITY

## 2024-09-20 NOTE — NURSING
Pre procedure instructions for thyroid fna given to pt , verbalized understanding, to arrive at 1030 on 9/30/24, no dietary restrictions, to avoid aspirin products 5 days prior to the procedure , may drive herself home.

## 2024-09-30 ENCOUNTER — HOSPITAL ENCOUNTER (OUTPATIENT)
Dept: RADIOLOGY | Facility: HOSPITAL | Age: 64
Discharge: HOME | End: 2024-09-30
Attending: SURGERY
Payer: MEDICARE

## 2024-09-30 DIAGNOSIS — E04.2 MULTIPLE THYROID NODULES: ICD-10-CM

## 2024-09-30 PROCEDURE — 10005 FNA BX W/US GDN 1ST LES: CPT | Performed by: RADIOLOGY

## 2024-09-30 PROCEDURE — 27200940 US FINE NEEDLE ASPIRATION THYROID, FIRST LESION

## 2024-09-30 PROCEDURE — 88173 CYTOPATH EVAL FNA REPORT: CPT | Performed by: SURGERY

## 2024-09-30 PROCEDURE — 27000342 US FINE NEEDLE ASPIRATION THYROID, FIRST LESION

## 2024-09-30 NOTE — PLAN OF CARE
Pt tolerated procedure well, bandaid to anterior neck cdi, reports slight discomfort to anterior neck, d/c instructions given and explained per md order, verbalizes understanding of all, nad noted, ambulatory for discharge via private vehicle.

## 2024-09-30 NOTE — DISCHARGE INSTRUCTIONS
Medical Imaging Discharge Instructions    Discharge Instructions After:  Thyroid FNA    Diet:  Resume diet as prescribed by your physician    Medications:  Resume medications as prescribed by your physician    Activity:  Rest today and Avoid strenuous activity for 1 days    Care of Dressing and Incision:  Do not remove dressing until 24 hours and May change dressing or bandage as needed    Report to M.D. any of the Following:  Any severe pain, new onset pain or worsening symptoms, Excessive bleeding, bruising or swelling, Temperature of 101 degrees or above, and Any sudden shortness of breath or bloody sputum greater than 2 tablespoons    Follow up with your physician regarding the results of this exam. Please feel free to call the radiology department at [phone] for any problems or questions. Ask to speak with the radiology nurse.    Kiah Guevara RN  Date of Service: 09/30/2024  11:54 AM

## 2024-09-30 NOTE — PLAN OF CARE
Pt ambulatory to u/s, aao3, maex4, denies pain or nausea, all questions answered, verbalizes understanding of all, nad noted.

## 2024-10-31 RX ORDER — ACYCLOVIR 400 MG/1
400 TABLET ORAL 2 TIMES DAILY
Qty: 180 TABLET | Refills: 1 | Status: SHIPPED | OUTPATIENT
Start: 2024-10-31 | End: 2025-04-29

## 2024-11-21 DIAGNOSIS — F41.9 ANXIETY: ICD-10-CM

## 2024-11-22 RX ORDER — CITALOPRAM 20 MG/1
20 TABLET, FILM COATED ORAL DAILY
Qty: 90 TABLET | Refills: 1 | Status: SHIPPED | OUTPATIENT
Start: 2024-11-22 | End: 2025-05-21

## 2025-01-02 DIAGNOSIS — J06.9 VIRAL URI WITH COUGH: Primary | ICD-10-CM

## 2025-01-02 RX ORDER — PROMETHAZINE HYDROCHLORIDE AND DEXTROMETHORPHAN HYDROBROMIDE 6.25; 15 MG/5ML; MG/5ML
5 SYRUP ORAL EVERY 8 HOURS PRN
Qty: 118 ML | Refills: 0 | Status: SHIPPED | OUTPATIENT
Start: 2025-01-02 | End: 2025-01-12

## 2025-01-02 NOTE — TELEPHONE ENCOUNTER
----- Message from Surekha sent at 1/2/2025 10:31 AM CST -----  - 10:27- pt would like to talk to nurse about having an upper respiratory infection for 3 weeks. She can not handle the cough. Would like something called in to take at night   [phone]

## 2025-01-02 NOTE — TELEPHONE ENCOUNTER
All flu like/URI symptoms have improved except the cough. Treated symptomatically with OTC meds, asking for cough syrup @ night, tessalon not helping, mucinex dm

## 2025-02-06 ENCOUNTER — TELEPHONE (OUTPATIENT)
Dept: FAMILY MEDICINE | Facility: CLINIC | Age: 65
End: 2025-02-06
Payer: MEDICARE

## 2025-02-06 DIAGNOSIS — E04.2 MULTIPLE THYROID NODULES: Primary | ICD-10-CM

## 2025-02-06 NOTE — TELEPHONE ENCOUNTER
----- Message from Nurse Parr sent at 8/6/2024  7:34 AM CDT -----  Regarding: Thyroid US  6 month thyroid US repeat

## 2025-02-13 ENCOUNTER — HOSPITAL ENCOUNTER (OUTPATIENT)
Dept: RADIOLOGY | Facility: HOSPITAL | Age: 65
Discharge: HOME | End: 2025-02-13
Attending: PHYSICIAN ASSISTANT
Payer: MEDICARE

## 2025-02-13 DIAGNOSIS — E04.2 MULTIPLE THYROID NODULES: ICD-10-CM

## 2025-02-13 PROCEDURE — 76536 US EXAM OF HEAD AND NECK: CPT | Mod: 26,,, | Performed by: RADIOLOGY

## 2025-02-13 PROCEDURE — 76536 US EXAM OF HEAD AND NECK: CPT | Mod: TC,PO

## 2025-02-17 ENCOUNTER — RESULTS FOLLOW-UP (OUTPATIENT)
Dept: FAMILY MEDICINE | Facility: CLINIC | Age: 65
End: 2025-02-17

## 2025-02-18 ENCOUNTER — OFFICE VISIT (OUTPATIENT)
Dept: FAMILY MEDICINE | Facility: CLINIC | Age: 65
End: 2025-02-18
Payer: MEDICARE

## 2025-02-18 VITALS
HEART RATE: 75 BPM | BODY MASS INDEX: 18.88 KG/M2 | HEIGHT: 61 IN | WEIGHT: 100 LBS | SYSTOLIC BLOOD PRESSURE: 108 MMHG | RESPIRATION RATE: 18 BRPM | DIASTOLIC BLOOD PRESSURE: 60 MMHG | OXYGEN SATURATION: 100 %

## 2025-02-18 DIAGNOSIS — F41.9 ANXIETY: ICD-10-CM

## 2025-02-18 DIAGNOSIS — F51.01 PRIMARY INSOMNIA: ICD-10-CM

## 2025-02-18 RX ORDER — ACYCLOVIR 400 MG/1
400 TABLET ORAL 2 TIMES DAILY
Qty: 180 TABLET | Refills: 1 | Status: SHIPPED | OUTPATIENT
Start: 2025-02-18 | End: 2025-08-17

## 2025-02-18 RX ORDER — TEMAZEPAM 15 MG/1
15 CAPSULE ORAL NIGHTLY PRN
Qty: 90 CAPSULE | Refills: 1 | Status: SHIPPED | OUTPATIENT
Start: 2025-02-18

## 2025-02-18 RX ORDER — CITALOPRAM 20 MG/1
20 TABLET, FILM COATED ORAL DAILY
Qty: 90 TABLET | Refills: 1 | Status: SHIPPED | OUTPATIENT
Start: 2025-02-18 | End: 2025-08-17

## 2025-04-29 ENCOUNTER — TELEPHONE (OUTPATIENT)
Dept: FAMILY MEDICINE | Facility: CLINIC | Age: 65
End: 2025-04-29
Payer: MEDICARE

## 2025-04-29 DIAGNOSIS — Z17.1 MALIGNANT NEOPLASM OF CENTRAL PORTION OF LEFT BREAST IN FEMALE, ESTROGEN RECEPTOR NEGATIVE: ICD-10-CM

## 2025-04-29 DIAGNOSIS — Z12.39 ENCOUNTER FOR BREAST CANCER SCREENING USING NON-MAMMOGRAM MODALITY: ICD-10-CM

## 2025-04-29 DIAGNOSIS — C50.112 MALIGNANT NEOPLASM OF CENTRAL PORTION OF LEFT BREAST IN FEMALE, ESTROGEN RECEPTOR NEGATIVE: ICD-10-CM

## 2025-04-29 DIAGNOSIS — Z85.3 HISTORY OF BREAST CANCER IN FEMALE: Primary | ICD-10-CM

## 2025-04-29 DIAGNOSIS — Z98.890 S/P BREAST RECONSTRUCTION: ICD-10-CM

## 2025-04-29 DIAGNOSIS — C50.212 CARCINOMA OF BREAST UPPER INNER QUADRANT, LEFT: ICD-10-CM

## 2025-04-29 NOTE — TELEPHONE ENCOUNTER
----- Message from Surekha sent at 4/29/2025 10:47 AM CDT -----  Vm- 9:32- pt is calling to see if she is due for her diagnostic breast ultrasound at [facility] [phone]

## 2025-04-29 NOTE — TELEPHONE ENCOUNTER
Said that Jayden ordered it but [facility] imaging would not book it, [facility] to [facility] [place]. Said it has been a while since done, dx due to hx of double mastectomy

## 2025-04-30 NOTE — TELEPHONE ENCOUNTER
I think we would just need to do bilateral breast ultrasound complete and send to [facility] in [place] although that is [facility] so should be same orders? They are epic too.

## 2025-05-01 ENCOUNTER — TELEPHONE (OUTPATIENT)
Dept: FAMILY MEDICINE | Facility: CLINIC | Age: 65
End: 2025-05-01
Payer: MEDICARE

## 2025-05-01 NOTE — TELEPHONE ENCOUNTER
----- Message from Surekha sent at 5/1/2025  2:51 PM CDT -----  Vm- 2:26- pt called Monday and got a call back about getting a referral to get [facility] diag[facility]. Was told we would talk to nagi and has not received a call back  [phone]

## 2025-05-01 NOTE — TELEPHONE ENCOUNTER
Faxed orders to [facility]  Ph: [phone]  Fax: [phone]   Notified pt

## 2025-05-15 ENCOUNTER — TELEPHONE (OUTPATIENT)
Dept: FAMILY MEDICINE | Facility: CLINIC | Age: 65
End: 2025-05-15
Payer: MEDICARE

## 2025-05-15 NOTE — TELEPHONE ENCOUNTER
----- Message from Surekha sent at 5/15/2025 10:57 AM CDT -----  - 10:05-pt had an ultrasound yesterday and would like to know when she will get the results [phone]

## 2025-05-19 ENCOUNTER — RESULTS FOLLOW-UP (OUTPATIENT)
Dept: FAMILY MEDICINE | Facility: CLINIC | Age: 65
End: 2025-05-19

## 2025-08-06 ENCOUNTER — TELEPHONE (OUTPATIENT)
Dept: FAMILY MEDICINE | Facility: CLINIC | Age: 65
End: 2025-08-06
Payer: MEDICARE

## 2025-08-06 DIAGNOSIS — Z00.00 ANNUAL PHYSICAL EXAM: Primary | ICD-10-CM

## 2025-08-19 ENCOUNTER — OFFICE VISIT (OUTPATIENT)
Dept: FAMILY MEDICINE | Facility: CLINIC | Age: 65
End: 2025-08-19
Payer: MEDICARE

## 2025-08-19 VITALS
OXYGEN SATURATION: 99 % | RESPIRATION RATE: 18 BRPM | SYSTOLIC BLOOD PRESSURE: 118 MMHG | HEART RATE: 76 BPM | WEIGHT: 100 LBS | BODY MASS INDEX: 18.88 KG/M2 | HEIGHT: 61 IN | DIASTOLIC BLOOD PRESSURE: 62 MMHG

## 2025-08-19 DIAGNOSIS — F51.01 PRIMARY INSOMNIA: ICD-10-CM

## 2025-08-19 DIAGNOSIS — F41.9 ANXIETY: ICD-10-CM

## 2025-08-19 DIAGNOSIS — C50.212 CARCINOMA OF BREAST UPPER INNER QUADRANT, LEFT: ICD-10-CM

## 2025-08-19 PROCEDURE — 99214 OFFICE O/P EST MOD 30 MIN: CPT | Mod: S$GLB,,, | Performed by: PHYSICIAN ASSISTANT

## 2025-08-19 RX ORDER — ACYCLOVIR 400 MG/1
400 TABLET ORAL 2 TIMES DAILY
Qty: 180 TABLET | Refills: 1 | Status: SHIPPED | OUTPATIENT
Start: 2025-08-19 | End: 2026-02-15

## 2025-08-19 RX ORDER — CITALOPRAM 20 MG/1
20 TABLET ORAL DAILY
Qty: 90 TABLET | Refills: 1 | Status: SHIPPED | OUTPATIENT
Start: 2025-08-19 | End: 2026-02-15

## 2025-08-19 RX ORDER — TEMAZEPAM 15 MG/1
15 CAPSULE ORAL NIGHTLY PRN
Qty: 90 CAPSULE | Refills: 1 | Status: SHIPPED | OUTPATIENT
Start: 2025-08-19

## 2025-08-20 ENCOUNTER — TELEPHONE (OUTPATIENT)
Facility: CLINIC | Age: 65
End: 2025-08-20
Payer: MEDICARE

## 2025-08-22 ENCOUNTER — TELEPHONE (OUTPATIENT)
Facility: CLINIC | Age: 65
End: 2025-08-22
Payer: MEDICARE

## 2025-08-28 ENCOUNTER — TELEPHONE (OUTPATIENT)
Facility: CLINIC | Age: 65
End: 2025-08-28
Payer: MEDICARE

## 2025-09-03 ENCOUNTER — OFFICE VISIT (OUTPATIENT)
Facility: CLINIC | Age: 65
End: 2025-09-03
Payer: MEDICARE

## 2025-09-03 VITALS
HEART RATE: 71 BPM | DIASTOLIC BLOOD PRESSURE: 68 MMHG | BODY MASS INDEX: 19.33 KG/M2 | WEIGHT: 102.31 LBS | TEMPERATURE: 97 F | RESPIRATION RATE: 18 BRPM | SYSTOLIC BLOOD PRESSURE: 118 MMHG

## 2025-09-03 DIAGNOSIS — C50.112 MALIGNANT NEOPLASM OF CENTRAL PORTION OF LEFT BREAST IN FEMALE, ESTROGEN RECEPTOR NEGATIVE: Primary | ICD-10-CM

## 2025-09-03 DIAGNOSIS — Z17.1 MALIGNANT NEOPLASM OF CENTRAL PORTION OF LEFT BREAST IN FEMALE, ESTROGEN RECEPTOR NEGATIVE: Primary | ICD-10-CM

## 2025-09-03 DIAGNOSIS — Z98.890 S/P BREAST RECONSTRUCTION: ICD-10-CM

## 2025-09-03 DIAGNOSIS — Z15.89 HOMOZYGOUS FOR MTHFR GENE MUTATION: ICD-10-CM

## 2025-09-03 DIAGNOSIS — E53.1 VITAMIN B6 DEFICIENCY (NON ANEMIC): ICD-10-CM

## 2025-09-03 DIAGNOSIS — E53.8 B12 DEFICIENCY: ICD-10-CM

## 2025-09-03 PROCEDURE — 99999 PR PBB SHADOW E&M-EST. PATIENT-LVL III: CPT | Mod: PBBFAC,,, | Performed by: NURSE PRACTITIONER

## 2025-09-03 PROCEDURE — 99213 OFFICE O/P EST LOW 20 MIN: CPT | Mod: PBBFAC,PN | Performed by: NURSE PRACTITIONER

## 2025-09-03 PROCEDURE — G2211 COMPLEX E/M VISIT ADD ON: HCPCS | Mod: ,,, | Performed by: NURSE PRACTITIONER

## 2025-09-03 PROCEDURE — 99204 OFFICE O/P NEW MOD 45 MIN: CPT | Mod: S$PBB,,, | Performed by: NURSE PRACTITIONER
